# Patient Record
Sex: MALE | Race: OTHER | HISPANIC OR LATINO
[De-identification: names, ages, dates, MRNs, and addresses within clinical notes are randomized per-mention and may not be internally consistent; named-entity substitution may affect disease eponyms.]

---

## 2017-02-21 ENCOUNTER — APPOINTMENT (OUTPATIENT)
Dept: INTERNAL MEDICINE | Facility: CLINIC | Age: 47
End: 2017-02-21

## 2017-03-06 ENCOUNTER — RESULT CHARGE (OUTPATIENT)
Age: 47
End: 2017-03-06

## 2017-03-07 ENCOUNTER — APPOINTMENT (OUTPATIENT)
Dept: INTERNAL MEDICINE | Facility: CLINIC | Age: 47
End: 2017-03-07

## 2017-03-07 VITALS
HEART RATE: 61 BPM | DIASTOLIC BLOOD PRESSURE: 89 MMHG | BODY MASS INDEX: 40.77 KG/M2 | HEIGHT: 68 IN | SYSTOLIC BLOOD PRESSURE: 166 MMHG | WEIGHT: 269 LBS

## 2017-03-09 LAB
ALBUMIN SERPL-MCNC: 4.1 G/DL
ALBUMIN/GLOB SERPL: 1.24
ALP SERPL-CCNC: 82 IU/L
ALT SERPL-CCNC: 25 IU/L
ANION GAP SERPL CALC-SCNC: 9 MEQ/L
AST SERPL-CCNC: 22 IU/L
BASOPHILS # BLD: 0.02 TH/MM3
BASOPHILS NFR BLD: 0.2 %
BILIRUB SERPL-MCNC: 1 MG/DL
BUN SERPL-MCNC: 8 MG/DL
BUN/CREAT SERPL: 11 %
CALCIUM SERPL-MCNC: 9.1 MG/DL
CHLORIDE SERPL-SCNC: 103 MEQ/L
CHOLEST SERPL-MCNC: 191 MG/DL
CO2 SERPL-SCNC: 29 MEQ/L
CREAT SERPL-MCNC: 0.73 MG/DL
EOSINOPHIL # BLD: 0.21 TH/MM3
EOSINOPHIL NFR BLD: 2.6 %
ERYTHROCYTE [DISTWIDTH] IN BLOOD BY AUTOMATED COUNT: 15 %
ESTIMATED AVERGAGE GLUCOSE (NORTH): 128 MG/DL
GFR SERPL CREATININE-BSD FRML MDRD: 116
GLUCOSE SERPL-MCNC: 98 MG/DL
GRANULOCYTES # BLD: 4.94 TH/MM3
GRANULOCYTES NFR BLD: 60.6 %
HBA1C MFR BLD: 6.1 %
HCT VFR BLD AUTO: 43.8 %
HDLC SERPL-MCNC: 35 MG/DL
HDLC SERPL: 5.46
HGB BLD-MCNC: 14.4 G/DL
IMM GRANULOCYTES # BLD: 0.02 TH/MM3
IMM GRANULOCYTES NFR BLD: 0.2 %
LDLC SERPL DIRECT ASSAY-MCNC: 141 MG/DL
LYMPHOCYTES # BLD: 2.44 TH/MM3
LYMPHOCYTES NFR BLD: 29.9 %
MCH RBC QN AUTO: 28.1 PG
MCHC RBC AUTO-ENTMCNC: 32.9 G/DL
MCV RBC AUTO: 85.4 FL
MONOCYTES # BLD: 0.53 TH/MM3
MONOCYTES NFR BLD: 6.5 %
PLATELET # BLD: 235 TH/MM3
PMV BLD AUTO: 10.6 FL
POTASSIUM SERPL-SCNC: 4.7 MMOL/L
PROT SERPL-MCNC: 7.4 G/DL
RBC # BLD AUTO: 5.13 MIL/MM3
SODIUM SERPL-SCNC: 141 MEQ/L
TRIGL SERPL-MCNC: 106 MG/DL
VLDLC SERPL-MCNC: 21 MG/DL
WBC # BLD: 8.16 TH/MM3

## 2017-05-19 ENCOUNTER — APPOINTMENT (OUTPATIENT)
Dept: INTERNAL MEDICINE | Facility: CLINIC | Age: 47
End: 2017-05-19

## 2017-05-19 VITALS
HEIGHT: 68 IN | HEART RATE: 80 BPM | SYSTOLIC BLOOD PRESSURE: 135 MMHG | DIASTOLIC BLOOD PRESSURE: 80 MMHG | BODY MASS INDEX: 41.37 KG/M2 | WEIGHT: 273 LBS

## 2017-05-19 DIAGNOSIS — K76.0 FATTY (CHANGE OF) LIVER, NOT ELSEWHERE CLASSIFIED: ICD-10-CM

## 2017-05-19 DIAGNOSIS — Z87.442 PERSONAL HISTORY OF URINARY CALCULI: ICD-10-CM

## 2017-05-19 DIAGNOSIS — Z86.79 PERSONAL HISTORY OF OTHER DISEASES OF THE CIRCULATORY SYSTEM: ICD-10-CM

## 2017-06-07 ENCOUNTER — APPOINTMENT (OUTPATIENT)
Dept: VASCULAR SURGERY | Facility: CLINIC | Age: 47
End: 2017-06-07

## 2017-06-07 VITALS
DIASTOLIC BLOOD PRESSURE: 80 MMHG | SYSTOLIC BLOOD PRESSURE: 130 MMHG | HEIGHT: 68 IN | BODY MASS INDEX: 41.37 KG/M2 | WEIGHT: 273 LBS

## 2017-06-15 ENCOUNTER — OUTPATIENT (OUTPATIENT)
Dept: OUTPATIENT SERVICES | Facility: HOSPITAL | Age: 47
LOS: 1 days | Discharge: HOME | End: 2017-06-15

## 2017-06-15 DIAGNOSIS — M51.26 OTHER INTERVERTEBRAL DISC DISPLACEMENT, LUMBAR REGION: Chronic | ICD-10-CM

## 2017-06-15 DIAGNOSIS — N20.0 CALCULUS OF KIDNEY: Chronic | ICD-10-CM

## 2017-06-20 ENCOUNTER — OUTPATIENT (OUTPATIENT)
Dept: OUTPATIENT SERVICES | Facility: HOSPITAL | Age: 47
LOS: 1 days | Discharge: HOME | End: 2017-06-20

## 2017-06-20 ENCOUNTER — APPOINTMENT (OUTPATIENT)
Dept: INTERNAL MEDICINE | Facility: CLINIC | Age: 47
End: 2017-06-20

## 2017-06-20 VITALS
HEIGHT: 68 IN | DIASTOLIC BLOOD PRESSURE: 82 MMHG | WEIGHT: 274 LBS | SYSTOLIC BLOOD PRESSURE: 132 MMHG | HEART RATE: 78 BPM | BODY MASS INDEX: 41.52 KG/M2

## 2017-06-20 DIAGNOSIS — N20.0 CALCULUS OF KIDNEY: Chronic | ICD-10-CM

## 2017-06-20 DIAGNOSIS — Z83.3 FAMILY HISTORY OF DIABETES MELLITUS: ICD-10-CM

## 2017-06-20 DIAGNOSIS — M79.601 PAIN IN RIGHT ARM: ICD-10-CM

## 2017-06-20 DIAGNOSIS — R42 DIZZINESS AND GIDDINESS: ICD-10-CM

## 2017-06-20 DIAGNOSIS — M51.26 OTHER INTERVERTEBRAL DISC DISPLACEMENT, LUMBAR REGION: Chronic | ICD-10-CM

## 2017-06-20 DIAGNOSIS — R14.1 GAS PAIN: ICD-10-CM

## 2017-06-20 DIAGNOSIS — Z87.891 PERSONAL HISTORY OF NICOTINE DEPENDENCE: ICD-10-CM

## 2017-06-22 ENCOUNTER — APPOINTMENT (OUTPATIENT)
Dept: VASCULAR SURGERY | Facility: HOSPITAL | Age: 47
End: 2017-06-22

## 2017-06-22 ENCOUNTER — OUTPATIENT (OUTPATIENT)
Dept: OUTPATIENT SERVICES | Facility: HOSPITAL | Age: 47
LOS: 1 days | Discharge: HOME | End: 2017-06-22

## 2017-06-22 DIAGNOSIS — N20.0 CALCULUS OF KIDNEY: Chronic | ICD-10-CM

## 2017-06-22 DIAGNOSIS — I83.892 VARICOSE VEINS OF LEFT LOWER EXTREMITY WITH OTHER COMPLICATIONS: ICD-10-CM

## 2017-06-22 DIAGNOSIS — M51.26 OTHER INTERVERTEBRAL DISC DISPLACEMENT, LUMBAR REGION: Chronic | ICD-10-CM

## 2017-06-28 DIAGNOSIS — I10 ESSENTIAL (PRIMARY) HYPERTENSION: ICD-10-CM

## 2017-06-28 DIAGNOSIS — Z01.818 ENCOUNTER FOR OTHER PREPROCEDURAL EXAMINATION: ICD-10-CM

## 2017-07-12 ENCOUNTER — APPOINTMENT (OUTPATIENT)
Dept: VASCULAR SURGERY | Facility: CLINIC | Age: 47
End: 2017-07-12

## 2017-07-12 VITALS — BODY MASS INDEX: 42.38 KG/M2 | HEIGHT: 67 IN | WEIGHT: 270 LBS

## 2017-08-22 DIAGNOSIS — I83.892 VARICOSE VEINS OF LEFT LOWER EXTREMITY WITH OTHER COMPLICATIONS: ICD-10-CM

## 2017-08-22 DIAGNOSIS — I10 ESSENTIAL (PRIMARY) HYPERTENSION: ICD-10-CM

## 2017-08-22 DIAGNOSIS — G47.33 OBSTRUCTIVE SLEEP APNEA (ADULT) (PEDIATRIC): ICD-10-CM

## 2017-08-22 DIAGNOSIS — E66.9 OBESITY, UNSPECIFIED: ICD-10-CM

## 2017-08-23 ENCOUNTER — APPOINTMENT (OUTPATIENT)
Dept: INTERNAL MEDICINE | Facility: CLINIC | Age: 47
End: 2017-08-23

## 2017-09-15 ENCOUNTER — APPOINTMENT (OUTPATIENT)
Dept: PULMONOLOGY | Facility: CLINIC | Age: 47
End: 2017-09-15

## 2018-01-10 ENCOUNTER — APPOINTMENT (OUTPATIENT)
Dept: VASCULAR SURGERY | Facility: CLINIC | Age: 48
End: 2018-01-10

## 2018-08-23 ENCOUNTER — OUTPATIENT (OUTPATIENT)
Dept: OUTPATIENT SERVICES | Facility: HOSPITAL | Age: 48
LOS: 1 days | Discharge: HOME | End: 2018-08-23

## 2018-08-23 DIAGNOSIS — I10 ESSENTIAL (PRIMARY) HYPERTENSION: ICD-10-CM

## 2018-08-23 DIAGNOSIS — Z00.00 ENCOUNTER FOR GENERAL ADULT MEDICAL EXAMINATION WITHOUT ABNORMAL FINDINGS: ICD-10-CM

## 2018-08-23 DIAGNOSIS — N20.0 CALCULUS OF KIDNEY: Chronic | ICD-10-CM

## 2018-08-23 DIAGNOSIS — M51.26 OTHER INTERVERTEBRAL DISC DISPLACEMENT, LUMBAR REGION: Chronic | ICD-10-CM

## 2018-08-30 DIAGNOSIS — M45.2 ANKYLOSING SPONDYLITIS OF CERVICAL REGION: ICD-10-CM

## 2018-08-30 DIAGNOSIS — R07.9 CHEST PAIN, UNSPECIFIED: ICD-10-CM

## 2018-08-31 ENCOUNTER — EMERGENCY (EMERGENCY)
Facility: HOSPITAL | Age: 48
LOS: 0 days | Discharge: HOME | End: 2018-09-01
Attending: EMERGENCY MEDICINE | Admitting: EMERGENCY MEDICINE

## 2018-08-31 VITALS
DIASTOLIC BLOOD PRESSURE: 68 MMHG | TEMPERATURE: 97 F | HEART RATE: 66 BPM | SYSTOLIC BLOOD PRESSURE: 121 MMHG | RESPIRATION RATE: 20 BRPM | OXYGEN SATURATION: 100 %

## 2018-08-31 DIAGNOSIS — M79.661 PAIN IN RIGHT LOWER LEG: ICD-10-CM

## 2018-08-31 DIAGNOSIS — M79.89 OTHER SPECIFIED SOFT TISSUE DISORDERS: ICD-10-CM

## 2018-08-31 DIAGNOSIS — N20.0 CALCULUS OF KIDNEY: Chronic | ICD-10-CM

## 2018-08-31 DIAGNOSIS — Z98.890 OTHER SPECIFIED POSTPROCEDURAL STATES: ICD-10-CM

## 2018-08-31 DIAGNOSIS — R06.02 SHORTNESS OF BREATH: ICD-10-CM

## 2018-08-31 DIAGNOSIS — I10 ESSENTIAL (PRIMARY) HYPERTENSION: ICD-10-CM

## 2018-08-31 DIAGNOSIS — M51.26 OTHER INTERVERTEBRAL DISC DISPLACEMENT, LUMBAR REGION: Chronic | ICD-10-CM

## 2018-08-31 NOTE — ED ADULT TRIAGE NOTE - CHIEF COMPLAINT QUOTE
Patient presents to ED with complaints of right lower leg and ankle swelling and pain for 1 month. Patient reports leg is red, and painful to touch and hurts most when ambulating.

## 2018-09-01 DIAGNOSIS — I82.90 ACUTE EMBOLISM AND THROMBOSIS OF UNSPECIFIED VEIN: Chronic | ICD-10-CM

## 2018-09-01 LAB
ALBUMIN SERPL ELPH-MCNC: 4.3 G/DL — SIGNIFICANT CHANGE UP (ref 3.5–5.2)
ALP SERPL-CCNC: 96 U/L — SIGNIFICANT CHANGE UP (ref 30–115)
ALT FLD-CCNC: 23 U/L — SIGNIFICANT CHANGE UP (ref 0–41)
ANION GAP SERPL CALC-SCNC: 12 MMOL/L — SIGNIFICANT CHANGE UP (ref 7–14)
APTT BLD: 31.6 SEC — SIGNIFICANT CHANGE UP (ref 27–39.2)
AST SERPL-CCNC: 20 U/L — SIGNIFICANT CHANGE UP (ref 0–41)
BASOPHILS # BLD AUTO: 0.04 K/UL — SIGNIFICANT CHANGE UP (ref 0–0.2)
BASOPHILS NFR BLD AUTO: 0.4 % — SIGNIFICANT CHANGE UP (ref 0–1)
BILIRUB SERPL-MCNC: 0.3 MG/DL — SIGNIFICANT CHANGE UP (ref 0.2–1.2)
BUN SERPL-MCNC: 15 MG/DL — SIGNIFICANT CHANGE UP (ref 10–20)
CALCIUM SERPL-MCNC: 8.8 MG/DL — SIGNIFICANT CHANGE UP (ref 8.5–10.1)
CHLORIDE SERPL-SCNC: 104 MMOL/L — SIGNIFICANT CHANGE UP (ref 98–110)
CO2 SERPL-SCNC: 27 MMOL/L — SIGNIFICANT CHANGE UP (ref 17–32)
CREAT SERPL-MCNC: 1.1 MG/DL — SIGNIFICANT CHANGE UP (ref 0.7–1.5)
D DIMER BLD IA.RAPID-MCNC: 137 NG/ML DDU — SIGNIFICANT CHANGE UP (ref 0–230)
EOSINOPHIL # BLD AUTO: 0.26 K/UL — SIGNIFICANT CHANGE UP (ref 0–0.7)
EOSINOPHIL NFR BLD AUTO: 2.6 % — SIGNIFICANT CHANGE UP (ref 0–8)
GLUCOSE SERPL-MCNC: 133 MG/DL — HIGH (ref 70–99)
HCT VFR BLD CALC: 38.3 % — LOW (ref 42–52)
HGB BLD-MCNC: 12.9 G/DL — LOW (ref 14–18)
IMM GRANULOCYTES NFR BLD AUTO: 0.3 % — SIGNIFICANT CHANGE UP (ref 0.1–0.3)
INR BLD: 1.09 RATIO — SIGNIFICANT CHANGE UP (ref 0.65–1.3)
LYMPHOCYTES # BLD AUTO: 3.28 K/UL — SIGNIFICANT CHANGE UP (ref 1.2–3.4)
LYMPHOCYTES # BLD AUTO: 33.1 % — SIGNIFICANT CHANGE UP (ref 20.5–51.1)
MCHC RBC-ENTMCNC: 28 PG — SIGNIFICANT CHANGE UP (ref 27–31)
MCHC RBC-ENTMCNC: 33.7 G/DL — SIGNIFICANT CHANGE UP (ref 32–37)
MCV RBC AUTO: 83.1 FL — SIGNIFICANT CHANGE UP (ref 80–94)
MONOCYTES # BLD AUTO: 0.83 K/UL — HIGH (ref 0.1–0.6)
MONOCYTES NFR BLD AUTO: 8.4 % — SIGNIFICANT CHANGE UP (ref 1.7–9.3)
NEUTROPHILS # BLD AUTO: 5.46 K/UL — SIGNIFICANT CHANGE UP (ref 1.4–6.5)
NEUTROPHILS NFR BLD AUTO: 55.2 % — SIGNIFICANT CHANGE UP (ref 42.2–75.2)
NT-PROBNP SERPL-SCNC: 49 PG/ML — SIGNIFICANT CHANGE UP (ref 0–300)
PLATELET # BLD AUTO: 222 K/UL — SIGNIFICANT CHANGE UP (ref 130–400)
POTASSIUM SERPL-MCNC: 4.6 MMOL/L — SIGNIFICANT CHANGE UP (ref 3.5–5)
POTASSIUM SERPL-SCNC: 4.6 MMOL/L — SIGNIFICANT CHANGE UP (ref 3.5–5)
PROT SERPL-MCNC: 7 G/DL — SIGNIFICANT CHANGE UP (ref 6–8)
PROTHROM AB SERPL-ACNC: 11.8 SEC — SIGNIFICANT CHANGE UP (ref 9.95–12.87)
RBC # BLD: 4.61 M/UL — LOW (ref 4.7–6.1)
RBC # FLD: 13.8 % — SIGNIFICANT CHANGE UP (ref 11.5–14.5)
SODIUM SERPL-SCNC: 143 MMOL/L — SIGNIFICANT CHANGE UP (ref 135–146)
TROPONIN T SERPL-MCNC: <0.01 NG/ML — SIGNIFICANT CHANGE UP
WBC # BLD: 9.9 K/UL — SIGNIFICANT CHANGE UP (ref 4.8–10.8)
WBC # FLD AUTO: 9.9 K/UL — SIGNIFICANT CHANGE UP (ref 4.8–10.8)

## 2018-09-01 NOTE — ED ADULT NURSE NOTE - NSIMPLEMENTINTERV_GEN_ALL_ED
Implemented All Universal Safety Interventions:  [place] to call system. Call bell, personal items and telephone within reach. Instruct patient to call for assistance. Room bathroom lighting operational. Non-slip footwear when patient is off stretcher. Physically safe environment: no spills, clutter or unnecessary equipment. Stretcher in lowest position, wheels locked, appropriate side rails in place.

## 2018-09-01 NOTE — ED ADULT NURSE NOTE - OBJECTIVE STATEMENT
Pt alert and oriented, pt c/o of RLE swelling and pain over the last month that is worsened over the past week. Pt states he was trying to get an apt with his MD but could not for 2 1/2 weeks. Pt states he has a hx of left leg thrombectomy. Pt states some SOB on exertion, sat @ 100% on RA at this time, pt denies any CP or n/v/d or fevers.

## 2018-09-01 NOTE — ED PROVIDER NOTE - ATTENDING CONTRIBUTION TO CARE
pt with chronic venous stasis, c/o leg swelling, right greater than left, for several days. also c/o sob. no fever, chills, cp, n, v, d. pt in nad, ctab, rrr, ab soft, nt, nd. b/l LE edema, chronic pete stasis dermatitis. nml pulses, cap refill, sensation. no calf tenderness. no homans. no crepitus. no streaking no lad. nml neuro exam. will get labs, imaging.

## 2018-09-01 NOTE — ED PROVIDER NOTE - NS ED ROS FT
Eyes:  No visual changes, eye pain or discharge.  ENMT:   no sore throat or runny nose  Cardiac:  No chest pain, + exertional sob x 1 week.   Respiratory:  No cough or respiratory distress.   GI:  No nausea, vomiting, diarrhea or abdominal pain.  :  No dysuria, frequency or burning.  MS:  + RLE swelling and pain x 1 month that has been getting worse. pain is worse with walking.   Neuro:  No headache   Skin:  No skin rash.   Endocrine: No history of thyroid disease or diabetes.

## 2018-09-01 NOTE — ED ADULT NURSE NOTE - PRIMARY CARE PROVIDER
PCP How Severe Are Your Spot(S)?: mild Have Your Spot(S) Been Treated In The Past?: has not been treated Hpi Title: Evaluation of Skin Lesions

## 2018-09-01 NOTE — ED PROVIDER NOTE - PHYSICAL EXAMINATION
CONSTITUTIONAL: Well-developed; well-nourished; in no acute distress.   SKIN: warm, dry  HEAD: Normocephalic; atraumatic.  EYES: PERRL, no conjunctival erythema  ENT: No nasal discharge; airway clear.  NECK: Supple; non tender.  CARD: S1, S2 normal; . Regular rate and rhythm.   RESP: No wheezes, rales or rhonchi.  ABD: soft ntnd  EXT: Normal ROM.  + RLE 2+ edema, + tenderness over the calf and lateral lower leg. patient able to ambulate. 5/5 strength   LYMPH: No acute cervical adenopathy.  NEURO: Alert, oriented, grossly unremarkable. neurovascularly intact

## 2018-09-01 NOTE — ED PROVIDER NOTE - OBJECTIVE STATEMENT
47 yo M pmh of HTN presents with pain and swelling in RLE for 1 month that has been getting worse. States that he thought it would resolved but hasn't so came in for evaluation. Also reports new exertional shortness of breath for 1 week. no cp, no n/v/d, no abdominal pain, no fever. Has been ambulating but states that leg pain is worse with ambulation.

## 2018-09-12 ENCOUNTER — APPOINTMENT (OUTPATIENT)
Dept: VASCULAR SURGERY | Facility: CLINIC | Age: 48
End: 2018-09-12
Payer: SUBSIDIZED

## 2018-09-12 DIAGNOSIS — Z01.818 ENCOUNTER FOR OTHER PREPROCEDURAL EXAMINATION: ICD-10-CM

## 2018-09-12 DIAGNOSIS — I83.12 VARICOSE VEINS OF LEFT LOWER EXTREMITY WITH INFLAMMATION: ICD-10-CM

## 2018-09-12 DIAGNOSIS — M79.661 PAIN IN RIGHT LOWER LEG: ICD-10-CM

## 2018-09-12 PROBLEM — I10 ESSENTIAL (PRIMARY) HYPERTENSION: Chronic | Status: ACTIVE | Noted: 2018-09-01

## 2018-09-12 PROCEDURE — 99213 OFFICE O/P EST LOW 20 MIN: CPT

## 2018-12-05 ENCOUNTER — APPOINTMENT (OUTPATIENT)
Dept: VASCULAR SURGERY | Facility: CLINIC | Age: 48
End: 2018-12-05

## 2019-04-05 ENCOUNTER — OUTPATIENT (OUTPATIENT)
Dept: OUTPATIENT SERVICES | Facility: HOSPITAL | Age: 49
LOS: 1 days | Discharge: HOME | End: 2019-04-05

## 2019-04-05 DIAGNOSIS — I82.90 ACUTE EMBOLISM AND THROMBOSIS OF UNSPECIFIED VEIN: Chronic | ICD-10-CM

## 2019-04-05 DIAGNOSIS — M51.26 OTHER INTERVERTEBRAL DISC DISPLACEMENT, LUMBAR REGION: Chronic | ICD-10-CM

## 2019-04-05 DIAGNOSIS — N20.0 CALCULUS OF KIDNEY: Chronic | ICD-10-CM

## 2019-04-05 DIAGNOSIS — E55.9 VITAMIN D DEFICIENCY, UNSPECIFIED: ICD-10-CM

## 2019-05-14 ENCOUNTER — EMERGENCY (EMERGENCY)
Facility: HOSPITAL | Age: 49
LOS: 0 days | Discharge: HOME | End: 2019-05-15
Attending: EMERGENCY MEDICINE | Admitting: EMERGENCY MEDICINE
Payer: SUBSIDIZED

## 2019-05-14 VITALS
DIASTOLIC BLOOD PRESSURE: 85 MMHG | TEMPERATURE: 98 F | HEART RATE: 78 BPM | SYSTOLIC BLOOD PRESSURE: 175 MMHG | OXYGEN SATURATION: 99 % | RESPIRATION RATE: 18 BRPM

## 2019-05-14 DIAGNOSIS — N20.0 CALCULUS OF KIDNEY: ICD-10-CM

## 2019-05-14 DIAGNOSIS — N20.0 CALCULUS OF KIDNEY: Chronic | ICD-10-CM

## 2019-05-14 DIAGNOSIS — I82.90 ACUTE EMBOLISM AND THROMBOSIS OF UNSPECIFIED VEIN: Chronic | ICD-10-CM

## 2019-05-14 DIAGNOSIS — M51.26 OTHER INTERVERTEBRAL DISC DISPLACEMENT, LUMBAR REGION: Chronic | ICD-10-CM

## 2019-05-14 DIAGNOSIS — R10.9 UNSPECIFIED ABDOMINAL PAIN: ICD-10-CM

## 2019-05-14 LAB
ALBUMIN SERPL ELPH-MCNC: 4.6 G/DL — SIGNIFICANT CHANGE UP (ref 3.5–5.2)
ALP SERPL-CCNC: 88 U/L — SIGNIFICANT CHANGE UP (ref 30–115)
ALT FLD-CCNC: 22 U/L — SIGNIFICANT CHANGE UP (ref 0–41)
ANION GAP SERPL CALC-SCNC: 14 MMOL/L — SIGNIFICANT CHANGE UP (ref 7–14)
APPEARANCE UR: ABNORMAL
APTT BLD: 32.5 SEC — SIGNIFICANT CHANGE UP (ref 27–39.2)
AST SERPL-CCNC: 17 U/L — SIGNIFICANT CHANGE UP (ref 0–41)
BASOPHILS # BLD AUTO: 0.03 K/UL — SIGNIFICANT CHANGE UP (ref 0–0.2)
BASOPHILS NFR BLD AUTO: 0.2 % — SIGNIFICANT CHANGE UP (ref 0–1)
BILIRUB DIRECT SERPL-MCNC: 0.2 MG/DL — SIGNIFICANT CHANGE UP (ref 0–0.2)
BILIRUB INDIRECT FLD-MCNC: 0.5 MG/DL — SIGNIFICANT CHANGE UP (ref 0.2–1.2)
BILIRUB SERPL-MCNC: 0.7 MG/DL — SIGNIFICANT CHANGE UP (ref 0.2–1.2)
BILIRUB UR-MCNC: NEGATIVE — SIGNIFICANT CHANGE UP
BUN SERPL-MCNC: 9 MG/DL — LOW (ref 10–20)
CALCIUM SERPL-MCNC: 9.1 MG/DL — SIGNIFICANT CHANGE UP (ref 8.5–10.1)
CHLORIDE SERPL-SCNC: 97 MMOL/L — LOW (ref 98–110)
CO2 SERPL-SCNC: 24 MMOL/L — SIGNIFICANT CHANGE UP (ref 17–32)
COLOR SPEC: ABNORMAL
CREAT SERPL-MCNC: 1 MG/DL — SIGNIFICANT CHANGE UP (ref 0.7–1.5)
DIFF PNL FLD: ABNORMAL
EOSINOPHIL # BLD AUTO: 0.01 K/UL — SIGNIFICANT CHANGE UP (ref 0–0.7)
EOSINOPHIL NFR BLD AUTO: 0.1 % — SIGNIFICANT CHANGE UP (ref 0–8)
GLUCOSE SERPL-MCNC: 120 MG/DL — HIGH (ref 70–99)
GLUCOSE UR QL: NEGATIVE MG/DL — SIGNIFICANT CHANGE UP
HCT VFR BLD CALC: 45.8 % — SIGNIFICANT CHANGE UP (ref 42–52)
HGB BLD-MCNC: 15.5 G/DL — SIGNIFICANT CHANGE UP (ref 14–18)
IMM GRANULOCYTES NFR BLD AUTO: 0.5 % — HIGH (ref 0.1–0.3)
INR BLD: 1.23 RATIO — SIGNIFICANT CHANGE UP (ref 0.65–1.3)
KETONES UR-MCNC: ABNORMAL
LACTATE SERPL-SCNC: 1.4 MMOL/L — SIGNIFICANT CHANGE UP (ref 0.5–2.2)
LEUKOCYTE ESTERASE UR-ACNC: ABNORMAL
LIDOCAIN IGE QN: 17 U/L — SIGNIFICANT CHANGE UP (ref 7–60)
LYMPHOCYTES # BLD AUTO: 13.4 % — LOW (ref 20.5–51.1)
LYMPHOCYTES # BLD AUTO: 2 K/UL — SIGNIFICANT CHANGE UP (ref 1.2–3.4)
MCHC RBC-ENTMCNC: 27.4 PG — SIGNIFICANT CHANGE UP (ref 27–31)
MCHC RBC-ENTMCNC: 33.8 G/DL — SIGNIFICANT CHANGE UP (ref 32–37)
MCV RBC AUTO: 80.9 FL — SIGNIFICANT CHANGE UP (ref 80–94)
MONOCYTES # BLD AUTO: 0.9 K/UL — HIGH (ref 0.1–0.6)
MONOCYTES NFR BLD AUTO: 6 % — SIGNIFICANT CHANGE UP (ref 1.7–9.3)
NEUTROPHILS # BLD AUTO: 11.92 K/UL — HIGH (ref 1.4–6.5)
NEUTROPHILS NFR BLD AUTO: 79.8 % — HIGH (ref 42.2–75.2)
NITRITE UR-MCNC: NEGATIVE — SIGNIFICANT CHANGE UP
NRBC # BLD: 0 /100 WBCS — SIGNIFICANT CHANGE UP (ref 0–0)
PH UR: 6 — SIGNIFICANT CHANGE UP (ref 5–8)
PLATELET # BLD AUTO: 231 K/UL — SIGNIFICANT CHANGE UP (ref 130–400)
POTASSIUM SERPL-MCNC: 4 MMOL/L — SIGNIFICANT CHANGE UP (ref 3.5–5)
POTASSIUM SERPL-SCNC: 4 MMOL/L — SIGNIFICANT CHANGE UP (ref 3.5–5)
PROT SERPL-MCNC: 8.1 G/DL — HIGH (ref 6–8)
PROT UR-MCNC: 100 MG/DL
PROTHROM AB SERPL-ACNC: 14.1 SEC — HIGH (ref 9.95–12.87)
RBC # BLD: 5.66 M/UL — SIGNIFICANT CHANGE UP (ref 4.7–6.1)
RBC # FLD: 13.7 % — SIGNIFICANT CHANGE UP (ref 11.5–14.5)
RBC CASTS # UR COMP ASSIST: >50 /HPF
SODIUM SERPL-SCNC: 135 MMOL/L — SIGNIFICANT CHANGE UP (ref 135–146)
SP GR SPEC: 1.02 — SIGNIFICANT CHANGE UP (ref 1.01–1.03)
TROPONIN T SERPL-MCNC: <0.01 NG/ML — SIGNIFICANT CHANGE UP
UROBILINOGEN FLD QL: 0.2 MG/DL — SIGNIFICANT CHANGE UP (ref 0.2–0.2)
WBC # BLD: 14.93 K/UL — HIGH (ref 4.8–10.8)
WBC # FLD AUTO: 14.93 K/UL — HIGH (ref 4.8–10.8)
WBC UR QL: ABNORMAL /HPF

## 2019-05-14 PROCEDURE — 93010 ELECTROCARDIOGRAM REPORT: CPT

## 2019-05-14 PROCEDURE — 71046 X-RAY EXAM CHEST 2 VIEWS: CPT | Mod: 26

## 2019-05-14 PROCEDURE — 99285 EMERGENCY DEPT VISIT HI MDM: CPT

## 2019-05-14 RX ORDER — MORPHINE SULFATE 50 MG/1
6 CAPSULE, EXTENDED RELEASE ORAL ONCE
Refills: 0 | Status: DISCONTINUED | OUTPATIENT
Start: 2019-05-14 | End: 2019-05-14

## 2019-05-14 RX ORDER — SODIUM CHLORIDE 9 MG/ML
1000 INJECTION INTRAMUSCULAR; INTRAVENOUS; SUBCUTANEOUS ONCE
Refills: 0 | Status: COMPLETED | OUTPATIENT
Start: 2019-05-14 | End: 2019-05-14

## 2019-05-14 RX ADMIN — SODIUM CHLORIDE 1000 MILLILITER(S): 9 INJECTION INTRAMUSCULAR; INTRAVENOUS; SUBCUTANEOUS at 22:51

## 2019-05-14 RX ADMIN — MORPHINE SULFATE 6 MILLIGRAM(S): 50 CAPSULE, EXTENDED RELEASE ORAL at 22:51

## 2019-05-14 NOTE — ED PROVIDER NOTE - ATTENDING CONTRIBUTION TO CARE
49 yo male with pmh of obesity, venous stasis dermatitis, kidney stones, sleep apnea, HTN, discectomy, presents to the ER for joint pains, body aches, diarrhea for 1-2 days,  and then left flank pain just PTA. Pt states he's having urinary frequency, no burning, some shaky chills, no fevers/N/sweats. Feels discomfort to his joints amanda to upper body compared to lower body, and had watery, non bloody diarrhea x several yesterday. Diarrhea improved after immodium. Noticed he then developed some discomfort to the left chest area which has been constant since yesterday. No SOB, cough, sneezing, runny nose, rashes, abdomen pain, testicular pain. Pt states he initially came for the cp/body aches but by the time he got to the ER he had left flank pain with no radiation to abdomen. 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 No travel or sick contacts. On exam ncat, perrl, eomi, lungs ctab, heart regular s1s2, abdomen soft nt/nd +bs, no flank tenderness, ext +joint tenderness to fingers of b/l hands, elbows b/l, shoulders b/l, and a little to knees b/l with no redness, warmth, or signif swelling, Neuro intact, no cspine tenderness. Check ekg, labs, xray, urine and reassess.

## 2019-05-14 NOTE — ED ADULT NURSE NOTE - NSIMPLEMENTINTERV_GEN_ALL_ED
Implemented All Universal Safety Interventions:  [place] to call system. Call bell, personal items and telephone within reach. Instruct patient to call for assistance. Room bathroom lighting operational. Non-slip footwear when patient is off stretcher. Physically safe environment: no spills, clutter or unnecessary equipment. Stretcher in lowest position, wheels locked, appropriate side rails in place.

## 2019-05-14 NOTE — ED PROVIDER NOTE - CARE PROVIDER_API CALL
Raffi Malone)  [facility]  [address] [place] [address], [address]  [place] [address]  Phone: [phone]  Fax: [phone]  Follow Up Time: 1-3 Days

## 2019-05-14 NOTE — ED PROVIDER NOTE - NS ED ROS FT
Review of Systems  Constitutional:  No fever, chills.  Eyes:  No visual changes, eye pain, or discharge.  ENMT:  No hearing changes, pain, or discharge. No nasal congestion, discharge, or bleeding. No throat pain, swelling, or difficulty swallowing.  Cardiac:  No chest pain, palpitations, syncope.  Respiratory:  No dyspnea, orthopnea, stridor, wheezing, or cough. No hemoptysis.  GI:  No nausea, vomiting, or abdominal pain. (+) diarrhea   :  No dysuria, hematuria, frequency, or burning.   MS:  (+) left flank pain  Skin:  No skin rash, pruritis, jaundice, or lesions.  Neuro:  No headache, dizziness, loss of sensation, or focal weakness.  No change in mental status.   Endocrine: No history of thyroid disease or diabetes.

## 2019-05-14 NOTE — ED ADULT NURSE NOTE - NS ED NURSE LEVEL OF CONSCIOUSNESS SPEECH
[FreeTextEntry1] : History of NADEGE diagnosed 2017; mod NADEGE AHI 25.\par Intolerant of CPAP in past\par Had EMA appliance made-was not successful as tongue stuck to it, patient pulled appliance out at night unawares. Seeing second dental opinion.\par 
Speaking Coherently

## 2019-05-14 NOTE — ED PROVIDER NOTE - OBJECTIVE STATEMENT
47 yo M with PMHx of HTN, venous stasis, BRISA, lumbar discectomy, and kidney stones presents to the ED c/o body aches, joint pains, non-bloody diarrhea over the past two days with development of left flank pain about an hour ago. Pt admits to associated urinary frequency and chills. Pt took imodium without relief of symptoms. Pt denies fever, nausea, vomiting, abdominal pain, headache, dizziness, weakness, chest pain, SOB, LOC, trauma, cough, calf pain/swelling, recent travel, recent surgery.

## 2019-05-14 NOTE — ED PROVIDER NOTE - PHYSICAL EXAMINATION
VITAL SIGNS: I have reviewed nursing notes and confirm.  CONSTITUTIONAL: Well-developed; well-nourished; in no acute distress.  SKIN: Skin exam is warm and dry, no acute rash.  HEAD: Normocephalic; atraumatic.  EYES: Conjunctiva and sclera clear.  ENT: No nasal discharge; airway clear.   NECK: Supple; non tender.  CARD: S1, S2 normal; no murmurs, gallops, or rubs. Regular rate and rhythm.  RESP: No wheezes, rales or rhonchi. Speaking in full sentences.   ABD: Normal bowel sounds; soft; non-distended; non-tender; No rebound or guarding. No CVA tenderness.  EXT: Normal ROM. No clubbing, cyanosis or edema.   NEURO: Alert, oriented. Grossly unremarkable. No focal deficits.

## 2019-05-14 NOTE — ED PROVIDER NOTE - CLINICAL SUMMARY MEDICAL DECISION MAKING FREE TEXT BOX
49 yo male presents to ER for body aches, joint pains, diarrhea and then left flank pain just PTA. Pt had bilateral shoulder pain going into chest, but at this point pain mostly left flank. Pt had labs,ekg, xray, and CT scan. Found to have kidney stone. Pain treated, referred to Urology and follow up instructions explained. Return to er for any worsening.

## 2019-05-14 NOTE — ED PROVIDER NOTE - PROGRESS NOTE DETAILS
Discussed case with urology, pt can follow-up with Dr. Deal as outpt. Will d/c with PO abx. Pt reports improvement in symptoms, currently asymptomatic. Discussed results and provided copy to pt. Pt understands to follow-up with urology. Pt understands to return to ED if symptoms return/worsen. Agreeable to discharge.

## 2019-05-15 VITALS
TEMPERATURE: 98 F | SYSTOLIC BLOOD PRESSURE: 106 MMHG | DIASTOLIC BLOOD PRESSURE: 57 MMHG | OXYGEN SATURATION: 98 % | RESPIRATION RATE: 19 BRPM | HEART RATE: 71 BPM

## 2019-05-15 PROCEDURE — 74177 CT ABD & PELVIS W/CONTRAST: CPT | Mod: 26

## 2019-05-15 RX ORDER — KETOROLAC TROMETHAMINE 30 MG/ML
1 SYRINGE (ML) INJECTION
Qty: 9 | Refills: 0
Start: 2019-05-15 | End: 2019-05-17

## 2019-05-15 RX ORDER — CEFTRIAXONE 500 MG/1
1 INJECTION, POWDER, FOR SOLUTION INTRAMUSCULAR; INTRAVENOUS ONCE
Refills: 0 | Status: COMPLETED | OUTPATIENT
Start: 2019-05-15 | End: 2019-05-15

## 2019-05-15 RX ORDER — KETOROLAC TROMETHAMINE 30 MG/ML
15 SYRINGE (ML) INJECTION ONCE
Refills: 0 | Status: DISCONTINUED | OUTPATIENT
Start: 2019-05-15 | End: 2019-05-15

## 2019-05-15 RX ORDER — SODIUM CHLORIDE 9 MG/ML
1000 INJECTION INTRAMUSCULAR; INTRAVENOUS; SUBCUTANEOUS ONCE
Refills: 0 | Status: COMPLETED | OUTPATIENT
Start: 2019-05-15 | End: 2019-05-15

## 2019-05-15 RX ORDER — TAMSULOSIN HYDROCHLORIDE 0.4 MG/1
1 CAPSULE ORAL
Qty: 7 | Refills: 0
Start: 2019-05-15 | End: 2019-05-21

## 2019-05-15 RX ORDER — ACETAMINOPHEN 500 MG
650 TABLET ORAL ONCE
Refills: 0 | Status: COMPLETED | OUTPATIENT
Start: 2019-05-15 | End: 2019-05-15

## 2019-05-15 RX ORDER — MORPHINE SULFATE 50 MG/1
6 CAPSULE, EXTENDED RELEASE ORAL ONCE
Refills: 0 | Status: DISCONTINUED | OUTPATIENT
Start: 2019-05-15 | End: 2019-05-15

## 2019-05-15 RX ORDER — TAMSULOSIN HYDROCHLORIDE 0.4 MG/1
0.4 CAPSULE ORAL ONCE
Refills: 0 | Status: COMPLETED | OUTPATIENT
Start: 2019-05-15 | End: 2019-05-15

## 2019-05-15 RX ORDER — CEFPODOXIME PROXETIL 100 MG
1 TABLET ORAL
Qty: 20 | Refills: 0
Start: 2019-05-15 | End: 2019-05-24

## 2019-05-15 RX ADMIN — Medication 650 MILLIGRAM(S): at 00:21

## 2019-05-15 RX ADMIN — MORPHINE SULFATE 6 MILLIGRAM(S): 50 CAPSULE, EXTENDED RELEASE ORAL at 01:32

## 2019-05-15 RX ADMIN — CEFTRIAXONE 100 GRAM(S): 500 INJECTION, POWDER, FOR SOLUTION INTRAMUSCULAR; INTRAVENOUS at 03:02

## 2019-05-15 RX ADMIN — SODIUM CHLORIDE 1000 MILLILITER(S): 9 INJECTION INTRAMUSCULAR; INTRAVENOUS; SUBCUTANEOUS at 03:02

## 2019-05-15 RX ADMIN — TAMSULOSIN HYDROCHLORIDE 0.4 MILLIGRAM(S): 0.4 CAPSULE ORAL at 02:38

## 2019-05-15 RX ADMIN — Medication 15 MILLIGRAM(S): at 01:33

## 2019-05-16 LAB
CULTURE RESULTS: SIGNIFICANT CHANGE UP
SPECIMEN SOURCE: SIGNIFICANT CHANGE UP

## 2019-08-26 ENCOUNTER — OUTPATIENT (OUTPATIENT)
Dept: OUTPATIENT SERVICES | Facility: HOSPITAL | Age: 49
LOS: 1 days | Discharge: HOME | End: 2019-08-26

## 2019-08-26 DIAGNOSIS — E66.9 OBESITY, UNSPECIFIED: ICD-10-CM

## 2019-08-26 DIAGNOSIS — I82.90 ACUTE EMBOLISM AND THROMBOSIS OF UNSPECIFIED VEIN: Chronic | ICD-10-CM

## 2019-08-26 DIAGNOSIS — R10.9 UNSPECIFIED ABDOMINAL PAIN: ICD-10-CM

## 2019-08-30 ENCOUNTER — EMERGENCY (EMERGENCY)
Facility: HOSPITAL | Age: 49
LOS: 0 days | Discharge: HOME | End: 2019-08-30
Attending: STUDENT IN AN ORGANIZED HEALTH CARE EDUCATION/TRAINING PROGRAM | Admitting: STUDENT IN AN ORGANIZED HEALTH CARE EDUCATION/TRAINING PROGRAM
Payer: SUBSIDIZED

## 2019-08-30 VITALS
OXYGEN SATURATION: 94 % | RESPIRATION RATE: 16 BRPM | SYSTOLIC BLOOD PRESSURE: 163 MMHG | TEMPERATURE: 97 F | HEART RATE: 70 BPM | WEIGHT: 279.99 LBS | DIASTOLIC BLOOD PRESSURE: 36 MMHG

## 2019-08-30 DIAGNOSIS — R10.9 UNSPECIFIED ABDOMINAL PAIN: ICD-10-CM

## 2019-08-30 DIAGNOSIS — I82.90 ACUTE EMBOLISM AND THROMBOSIS OF UNSPECIFIED VEIN: Chronic | ICD-10-CM

## 2019-08-30 DIAGNOSIS — M51.26 OTHER INTERVERTEBRAL DISC DISPLACEMENT, LUMBAR REGION: Chronic | ICD-10-CM

## 2019-08-30 DIAGNOSIS — I10 ESSENTIAL (PRIMARY) HYPERTENSION: ICD-10-CM

## 2019-08-30 DIAGNOSIS — N20.0 CALCULUS OF KIDNEY: Chronic | ICD-10-CM

## 2019-08-30 DIAGNOSIS — F10.99 ALCOHOL USE, UNSPECIFIED WITH UNSPECIFIED ALCOHOL-INDUCED DISORDER: ICD-10-CM

## 2019-08-30 DIAGNOSIS — N20.0 CALCULUS OF KIDNEY: ICD-10-CM

## 2019-08-30 DIAGNOSIS — Z98.890 OTHER SPECIFIED POSTPROCEDURAL STATES: ICD-10-CM

## 2019-08-30 LAB
ALBUMIN SERPL ELPH-MCNC: 4.3 G/DL — SIGNIFICANT CHANGE UP (ref 3.5–5.2)
ALP SERPL-CCNC: 92 U/L — SIGNIFICANT CHANGE UP (ref 30–115)
ALT FLD-CCNC: 28 U/L — SIGNIFICANT CHANGE UP (ref 0–41)
ANION GAP SERPL CALC-SCNC: 11 MMOL/L — SIGNIFICANT CHANGE UP (ref 7–14)
APPEARANCE UR: CLEAR — SIGNIFICANT CHANGE UP
AST SERPL-CCNC: 23 U/L — SIGNIFICANT CHANGE UP (ref 0–41)
BACTERIA # UR AUTO: NEGATIVE — SIGNIFICANT CHANGE UP
BASOPHILS # BLD AUTO: 0.04 K/UL — SIGNIFICANT CHANGE UP (ref 0–0.2)
BASOPHILS NFR BLD AUTO: 0.5 % — SIGNIFICANT CHANGE UP (ref 0–1)
BILIRUB SERPL-MCNC: 0.4 MG/DL — SIGNIFICANT CHANGE UP (ref 0.2–1.2)
BILIRUB UR-MCNC: NEGATIVE — SIGNIFICANT CHANGE UP
BUN SERPL-MCNC: 14 MG/DL — SIGNIFICANT CHANGE UP (ref 10–20)
CALCIUM SERPL-MCNC: 9.3 MG/DL — SIGNIFICANT CHANGE UP (ref 8.5–10.1)
CHLORIDE SERPL-SCNC: 105 MMOL/L — SIGNIFICANT CHANGE UP (ref 98–110)
CO2 SERPL-SCNC: 26 MMOL/L — SIGNIFICANT CHANGE UP (ref 17–32)
COLOR SPEC: YELLOW — SIGNIFICANT CHANGE UP
CREAT SERPL-MCNC: 0.9 MG/DL — SIGNIFICANT CHANGE UP (ref 0.7–1.5)
DIFF PNL FLD: ABNORMAL
EOSINOPHIL # BLD AUTO: 0.17 K/UL — SIGNIFICANT CHANGE UP (ref 0–0.7)
EOSINOPHIL NFR BLD AUTO: 2.1 % — SIGNIFICANT CHANGE UP (ref 0–8)
EPI CELLS # UR: 0 /HPF — SIGNIFICANT CHANGE UP (ref 0–5)
GLUCOSE SERPL-MCNC: 120 MG/DL — HIGH (ref 70–99)
GLUCOSE UR QL: NEGATIVE — SIGNIFICANT CHANGE UP
HCT VFR BLD CALC: 42.7 % — SIGNIFICANT CHANGE UP (ref 42–52)
HGB BLD-MCNC: 14.1 G/DL — SIGNIFICANT CHANGE UP (ref 14–18)
HYALINE CASTS # UR AUTO: 0 /LPF — SIGNIFICANT CHANGE UP (ref 0–7)
IMM GRANULOCYTES NFR BLD AUTO: 0.3 % — SIGNIFICANT CHANGE UP (ref 0.1–0.3)
KETONES UR-MCNC: NEGATIVE — SIGNIFICANT CHANGE UP
LACTATE SERPL-SCNC: 1.4 MMOL/L — SIGNIFICANT CHANGE UP (ref 0.5–2.2)
LEUKOCYTE ESTERASE UR-ACNC: NEGATIVE — SIGNIFICANT CHANGE UP
LIDOCAIN IGE QN: 28 U/L — SIGNIFICANT CHANGE UP (ref 7–60)
LYMPHOCYTES # BLD AUTO: 2.78 K/UL — SIGNIFICANT CHANGE UP (ref 1.2–3.4)
LYMPHOCYTES # BLD AUTO: 35 % — SIGNIFICANT CHANGE UP (ref 20.5–51.1)
MCHC RBC-ENTMCNC: 27.5 PG — SIGNIFICANT CHANGE UP (ref 27–31)
MCHC RBC-ENTMCNC: 33 G/DL — SIGNIFICANT CHANGE UP (ref 32–37)
MCV RBC AUTO: 83.4 FL — SIGNIFICANT CHANGE UP (ref 80–94)
MONOCYTES # BLD AUTO: 0.59 K/UL — SIGNIFICANT CHANGE UP (ref 0.1–0.6)
MONOCYTES NFR BLD AUTO: 7.4 % — SIGNIFICANT CHANGE UP (ref 1.7–9.3)
NEUTROPHILS # BLD AUTO: 4.34 K/UL — SIGNIFICANT CHANGE UP (ref 1.4–6.5)
NEUTROPHILS NFR BLD AUTO: 54.7 % — SIGNIFICANT CHANGE UP (ref 42.2–75.2)
NITRITE UR-MCNC: NEGATIVE — SIGNIFICANT CHANGE UP
NRBC # BLD: 0 /100 WBCS — SIGNIFICANT CHANGE UP (ref 0–0)
PH UR: 6 — SIGNIFICANT CHANGE UP (ref 5–8)
PLATELET # BLD AUTO: 249 K/UL — SIGNIFICANT CHANGE UP (ref 130–400)
POTASSIUM SERPL-MCNC: 4.6 MMOL/L — SIGNIFICANT CHANGE UP (ref 3.5–5)
POTASSIUM SERPL-SCNC: 4.6 MMOL/L — SIGNIFICANT CHANGE UP (ref 3.5–5)
PROT SERPL-MCNC: 8.1 G/DL — HIGH (ref 6–8)
PROT UR-MCNC: SIGNIFICANT CHANGE UP
RBC # BLD: 5.12 M/UL — SIGNIFICANT CHANGE UP (ref 4.7–6.1)
RBC # FLD: 13.4 % — SIGNIFICANT CHANGE UP (ref 11.5–14.5)
RBC CASTS # UR COMP ASSIST: 447 /HPF — HIGH (ref 0–4)
SODIUM SERPL-SCNC: 142 MMOL/L — SIGNIFICANT CHANGE UP (ref 135–146)
SP GR SPEC: 1.02 — SIGNIFICANT CHANGE UP (ref 1.01–1.02)
UROBILINOGEN FLD QL: SIGNIFICANT CHANGE UP
WBC # BLD: 7.94 K/UL — SIGNIFICANT CHANGE UP (ref 4.8–10.8)
WBC # FLD AUTO: 7.94 K/UL — SIGNIFICANT CHANGE UP (ref 4.8–10.8)
WBC UR QL: 2 /HPF — SIGNIFICANT CHANGE UP (ref 0–5)

## 2019-08-30 PROCEDURE — 74176 CT ABD & PELVIS W/O CONTRAST: CPT | Mod: 26

## 2019-08-30 PROCEDURE — 99284 EMERGENCY DEPT VISIT MOD MDM: CPT

## 2019-08-30 PROCEDURE — 99053 MED SERV 10PM-8AM 24 HR FAC: CPT

## 2019-08-30 RX ORDER — KETOROLAC TROMETHAMINE 30 MG/ML
30 SYRINGE (ML) INJECTION ONCE
Refills: 0 | Status: DISCONTINUED | OUTPATIENT
Start: 2019-08-30 | End: 2019-08-30

## 2019-08-30 RX ORDER — TAMSULOSIN HYDROCHLORIDE 0.4 MG/1
1 CAPSULE ORAL
Qty: 30 | Refills: 0
Start: 2019-08-30 | End: 2019-09-28

## 2019-08-30 RX ORDER — SODIUM CHLORIDE 9 MG/ML
1000 INJECTION INTRAMUSCULAR; INTRAVENOUS; SUBCUTANEOUS ONCE
Refills: 0 | Status: COMPLETED | OUTPATIENT
Start: 2019-08-30 | End: 2019-08-30

## 2019-08-30 RX ADMIN — Medication 30 MILLIGRAM(S): at 08:19

## 2019-08-30 RX ADMIN — SODIUM CHLORIDE 2000 MILLILITER(S): 9 INJECTION INTRAMUSCULAR; INTRAVENOUS; SUBCUTANEOUS at 08:19

## 2019-08-30 NOTE — ED ADULT TRIAGE NOTE - AS O2 DELIVERY
Patient seen and examined at bedside. Pain improved, resting in bed.    ICU Vital Signs Last 24 Hrs  T(C): 36.5 (02 Jul 2018 05:09), Max: 36.9 (01 Jul 2018 21:50)  T(F): 97.7 (02 Jul 2018 05:09), Max: 98.4 (01 Jul 2018 21:50)  HR: 77 (02 Jul 2018 05:09) (73 - 89)  BP: 135/86 (02 Jul 2018 05:09) (104/70 - 147/90)  BP(mean): --  ABP: --  ABP(mean): --  RR: 18 (02 Jul 2018 05:09) (18 - 18)  SpO2: 93% (02 Jul 2018 05:09) (93% - 95%)    Gen: Awake, alert, NAD    Spine  -EHL/FHL/TA/GSC/Quad/Ham/HF/HE 5/5  -SILT L2-S1  -DP/PT pulses 2+  -B/L LE wrapped in Ace wraps for lymphedema  -no calf TTP room air

## 2019-08-30 NOTE — ED PROVIDER NOTE - OBJECTIVE STATEMENT
Pt is a 48 y/o M w/ h/o recurrent kidney stones, HTN, and lumbar disc herniation (s/p discectomy), presenting today w/ L flank pain x 2 weeks. The pain is described as sharp and colicky, w/ radiation to L suprapubic region. He also notes hematuria found on UA done 1 week ago. He reports he last had a kidney stone 3 months ago, and once required ureteroscopic removal for a stone 8 years ago. He denies any recent strenuous activity, denies any trauma. He denies h/o smoking. He denies fever, chills, n/v, diarrhea, constipation, abd pain.

## 2019-08-30 NOTE — ED PROVIDER NOTE - PATIENT PORTAL LINK FT
You can access the FollowMyHealth Patient Portal offered by [facility] by registering at the following website: [email or web address]. By joining [facility]’s FollowMyHealth portal, you will also be able to view your health information using other applications (apps) compatible with our system.

## 2019-08-30 NOTE — ED PROVIDER NOTE - PHYSICAL EXAMINATION
GEN: well appearing male in mild distress, standing and shifting positions  CARD: nl s1 s2, RRR, no m/r/g  RESP: nl respiratory effort, CTAB  ABD: obese, soft, non-tender, no palpable pulsations, no abd bruits auscultated  BACK: no CVA tenderness, no midline tenderness, nl ROM  EXT: FROMx4, negative straight leg raise b/l  NEURO: A&Ox3, nl sensation to gross touch b/l, 5/5 strength b/l  SKIN: warm, dry

## 2019-08-30 NOTE — ED ADULT NURSE NOTE - OBJECTIVE STATEMENT
patient c/o left flank pain for approx 3 weeks. denies any fevers/chills/nausea/vomiting. c/o urinary frequency

## 2019-08-30 NOTE — ED PROVIDER NOTE - CLINICAL SUMMARY MEDICAL DECISION MAKING FREE TEXT BOX
Pt w/ L renal stone. NO uti. no acute Ed events. stable for dc w/  fup. Pt understands signs and symptoms for ED return. dc home.

## 2019-08-30 NOTE — ED PROVIDER NOTE - NS ED ROS FT
CONST: no fever, no chills  HEENT: no eye pain, no ear pain  CARD: no chest pain, no palpitations  RESP: no cough, no SOB  GI: no nausea, no vomiting, no diarrhea, no constipation, no abd pain  : see HPI  MSK: no back pain, no limb pain  SKIN: no rash, no pruritus  NEURO: no headache, no dizziness  ENDO: no polydypsia, no polyuria

## 2019-08-30 NOTE — ED PROVIDER NOTE - ATTENDING CONTRIBUTION TO CARE
48 y/o M pmh renal stones, HTN, lumbar disc herniation s/p discectomy, p/w sharp, colicky, intermittent left- sided flank pain w/ rad to suprapubic region x 2 wks. + hematuria on UA 1 wk ago. + voided renal stone 3 mo ago. + hx ureteral stenting. No trauma or strenuous activity. Pain is somewhat similar to prior stone, does not feel like herniation.  ROS PE above. IMP: renal stone vs MSK vs UTI. P: ua, ct abd, cbc, cmp, analgesia, reassess.

## 2019-08-30 NOTE — ED PROVIDER NOTE - CARE PLAN
Assessment and plan of treatment:	50 y/o M w/ h/o recurrent uroliths p/w L flank pain x 2 weeks.   Impression: renal stone vs MSK, more likely stone given pain location/description and positive UA 1 week ago.  Plan: pain control, labs, CT abd/pelvis Principal Discharge DX:	Kidney stones  Assessment and plan of treatment:	48 y/o M w/ h/o recurrent uroliths p/w L flank pain x 2 weeks.   Impression: renal stone vs MSK, more likely stone given pain location/description and positive UA 1 week ago.  Plan: pain control, labs, CT abd/pelvis

## 2019-08-30 NOTE — ED PROVIDER NOTE - PSH
Herniated lumbar intervertebral disc  discectomy  Kidney stone  ureteroscopy  Thrombus  thrombectomy of left leg

## 2019-08-30 NOTE — ED PROVIDER NOTE - PLAN OF CARE
48 y/o M w/ h/o recurrent uroliths p/w L flank pain x 2 weeks.   Impression: renal stone vs MSK, more likely stone given pain location/description and positive UA 1 week ago.  Plan: pain control, labs, CT abd/pelvis

## 2019-08-31 LAB
CULTURE RESULTS: NO GROWTH — SIGNIFICANT CHANGE UP
SPECIMEN SOURCE: SIGNIFICANT CHANGE UP

## 2019-09-10 PROBLEM — N20.0 CALCULUS OF KIDNEY: Chronic | Status: ACTIVE | Noted: 2019-08-30

## 2019-09-10 PROBLEM — M51.26 OTHER INTERVERTEBRAL DISC DISPLACEMENT, LUMBAR REGION: Chronic | Status: ACTIVE | Noted: 2019-08-30

## 2019-09-17 ENCOUNTER — APPOINTMENT (OUTPATIENT)
Dept: NEPHROLOGY | Facility: CLINIC | Age: 49
End: 2019-09-17
Payer: COMMERCIAL

## 2019-09-17 ENCOUNTER — OUTPATIENT (OUTPATIENT)
Dept: OUTPATIENT SERVICES | Facility: HOSPITAL | Age: 49
LOS: 1 days | Discharge: HOME | End: 2019-09-17

## 2019-09-17 VITALS
WEIGHT: 272 LBS | DIASTOLIC BLOOD PRESSURE: 89 MMHG | BODY MASS INDEX: 42.69 KG/M2 | HEIGHT: 67 IN | HEART RATE: 64 BPM | SYSTOLIC BLOOD PRESSURE: 154 MMHG

## 2019-09-17 DIAGNOSIS — M51.26 OTHER INTERVERTEBRAL DISC DISPLACEMENT, LUMBAR REGION: Chronic | ICD-10-CM

## 2019-09-17 DIAGNOSIS — I82.90 ACUTE EMBOLISM AND THROMBOSIS OF UNSPECIFIED VEIN: Chronic | ICD-10-CM

## 2019-09-17 DIAGNOSIS — N20.0 CALCULUS OF KIDNEY: Chronic | ICD-10-CM

## 2019-09-17 DIAGNOSIS — Z86.79 PERSONAL HISTORY OF OTHER DISEASES OF THE CIRCULATORY SYSTEM: ICD-10-CM

## 2019-09-17 PROCEDURE — 99212 OFFICE O/P EST SF 10 MIN: CPT | Mod: GC

## 2019-09-17 RX ORDER — MELOXICAM 15 MG/1
15 TABLET ORAL DAILY
Qty: 14 | Refills: 0 | Status: DISCONTINUED | COMMUNITY
Start: 2017-06-20 | End: 2019-09-17

## 2019-09-17 NOTE — HISTORY OF PRESENT ILLNESS
[FreeTextEntry1] : 49 years old male pt with past medical hx of obesity, HTN, recurrent kidney stones came for follow up.\par He was recently in ER in august 30 for kidney stones, where he presented with flank pain for 2 weeks with blood in urine, he was started on tamsulosin, his last episode was 3 months ago, had lithotripsy 10 years ago.\par he is a  by profession, drinks almost 10 glasses of water, denies any family h/o of kidney stones, denies any dry eyes, ulcers.

## 2019-09-17 NOTE — ASSESSMENT
[FreeTextEntry1] : 49 years old male pt with past medica; hx of HTN, prediabetes, recurrent kidney stones and obesity came for evaluation for kidney stones\par \par # kidney stones\par   will check ca, PTH\par   will do 24 hour urine chemistry\par   encourage to drink > 2l Of fluids\par   avoid excessive salt\par   continue tamsulosin for now\par \par # HTN\par    uncontrolled\par    will check urine proteinura\par    need evaluation for sleep apnea\par   will restart lisinopril for now\par \par # prediabetes\par    last hba1c 6.1\par   educated for dietary modification and low carbohydrate diet\par

## 2019-09-17 NOTE — PHYSICAL EXAM
[General Appearance - Alert] : alert [Sclera] : the sclera and conjunctiva were normal [Outer Ear] : the ears and nose were normal in appearance [Neck Appearance] : the appearance of the neck was normal [] : no respiratory distress [Apical Impulse] : the apical impulse was normal [Arterial Pulses Carotid] : carotid pulses were normal with no bruits [Bowel Sounds] : normal bowel sounds [Abdomen Soft] : soft [Abdomen Tenderness] : non-tender [Normal Sphincter Tone] : normal sphincter tone [Cervical Lymph Nodes Enlarged Posterior Bilaterally] : posterior cervical [No CVA Tenderness] : no ~M costovertebral angle tenderness [Abnormal Walk] : normal gait

## 2019-09-24 ENCOUNTER — APPOINTMENT (OUTPATIENT)
Dept: NEPHROLOGY | Facility: CLINIC | Age: 49
End: 2019-09-24

## 2020-03-06 ENCOUNTER — OUTPATIENT (OUTPATIENT)
Dept: OUTPATIENT SERVICES | Facility: HOSPITAL | Age: 50
LOS: 1 days | Discharge: HOME | End: 2020-03-06

## 2020-03-06 ENCOUNTER — APPOINTMENT (OUTPATIENT)
Dept: INTERNAL MEDICINE | Facility: CLINIC | Age: 50
End: 2020-03-06
Payer: SELF-PAY

## 2020-03-06 VITALS
HEART RATE: 90 BPM | DIASTOLIC BLOOD PRESSURE: 93 MMHG | SYSTOLIC BLOOD PRESSURE: 156 MMHG | BODY MASS INDEX: 42.69 KG/M2 | WEIGHT: 272 LBS | HEIGHT: 67 IN

## 2020-03-06 DIAGNOSIS — Z78.9 OTHER SPECIFIED HEALTH STATUS: ICD-10-CM

## 2020-03-06 DIAGNOSIS — M51.26 OTHER INTERVERTEBRAL DISC DISPLACEMENT, LUMBAR REGION: Chronic | ICD-10-CM

## 2020-03-06 DIAGNOSIS — N20.0 CALCULUS OF KIDNEY: Chronic | ICD-10-CM

## 2020-03-06 DIAGNOSIS — I82.90 ACUTE EMBOLISM AND THROMBOSIS OF UNSPECIFIED VEIN: Chronic | ICD-10-CM

## 2020-03-06 PROCEDURE — 99213 OFFICE O/P EST LOW 20 MIN: CPT | Mod: GC

## 2020-03-06 RX ORDER — SIMETHICONE 125 MG
125 TABLET,CHEWABLE ORAL
Qty: 30 | Refills: 5 | Status: DISCONTINUED | COMMUNITY
Start: 2017-05-19 | End: 2020-03-06

## 2020-03-06 RX ORDER — BIOTIN 10 MG
TABLET ORAL DAILY
Qty: 30 | Refills: 3 | Status: DISCONTINUED | COMMUNITY
Start: 2017-05-19 | End: 2020-03-06

## 2020-03-06 RX ORDER — LISINOPRIL 10 MG/1
10 TABLET ORAL DAILY
Qty: 30 | Refills: 5 | Status: DISCONTINUED | COMMUNITY
Start: 2017-03-07 | End: 2020-03-06

## 2020-03-06 RX ORDER — HYDROCORTISONE 1 %
12 CREAM (GRAM) TOPICAL TWICE DAILY
Qty: 2 | Refills: 2 | Status: DISCONTINUED | COMMUNITY
Start: 2017-05-19 | End: 2020-03-06

## 2020-03-06 RX ORDER — TAMSULOSIN HYDROCHLORIDE 0.4 MG/1
0.4 CAPSULE ORAL
Qty: 30 | Refills: 3 | Status: DISCONTINUED | COMMUNITY
Start: 2019-09-17 | End: 2020-03-06

## 2020-03-06 NOTE — PHYSICAL EXAM
[No Acute Distress] : no acute distress [Well Nourished] : well nourished [Well Developed] : well developed [No Respiratory Distress] : no respiratory distress  [No Accessory Muscle Use] : no accessory muscle use [Clear to Auscultation] : lungs were clear to auscultation bilaterally [Normal Rate] : normal rate  [Regular Rhythm] : with a regular rhythm [Normal S1, S2] : normal S1 and S2 [Soft] : abdomen soft [Non Tender] : non-tender [No HSM] : no HSM [Normal Bowel Sounds] : normal bowel sounds [Normal] : soft, non-tender, non-distended, no masses palpated, no HSM and normal bowel sounds [No Rash] : no rash [No Focal Deficits] : no focal deficits [Normal Insight/Judgement] : insight and judgment were intact

## 2020-03-06 NOTE — PLAN
[FreeTextEntry1] : 50 y/o HTN,  hx of kidney stones, lost to follow up, here for exam and med refills, c/o blurry vision and dizziness\par \par #HTN\par #blurry vision/dizziness \par -likely from uncontrolled htn, pt has been off meds for the past 2 months vs presbyopia \par -resume lisinopril 10 QD and BP readings at home\par -ophthal referral \par \par #GERD\par -start protonix 40 \par \par #hx of nephrolithiasis, asymtomatic\par -f/u Dr. Kleiner \par \par \par Flu shot given today 3/6/20\par \par RTC in 3 months, labs \par \par \par

## 2020-03-06 NOTE — HISTORY OF PRESENT ILLNESS
[FreeTextEntry1] : f/u  [de-identified] : 49 years old male pt with past medical hx of obesity, HTN, hx of nephrolithiasis here for blurry vision x 2 months with headaches, dizziness, denies chest pain, palpitations. Pt states that he has stopped his BP meds a couple months ago and hasn't had any refills and havent seen any doctors. Pt is also experiencing some heart burn ~3 times/week, denies wt loss. \par \par ROS+ wt gain, denies fever, chills, cough, diarrhea

## 2020-03-31 ENCOUNTER — OUTPATIENT (OUTPATIENT)
Dept: OUTPATIENT SERVICES | Facility: HOSPITAL | Age: 50
LOS: 1 days | Discharge: HOME | End: 2020-03-31

## 2020-03-31 ENCOUNTER — APPOINTMENT (OUTPATIENT)
Dept: INTERNAL MEDICINE | Facility: CLINIC | Age: 50
End: 2020-03-31
Payer: COMMERCIAL

## 2020-03-31 VITALS — BODY MASS INDEX: 42.69 KG/M2 | TEMPERATURE: 98.4 F | WEIGHT: 272 LBS | HEIGHT: 67 IN

## 2020-03-31 DIAGNOSIS — Z87.09 PERSONAL HISTORY OF OTHER DISEASES OF THE RESPIRATORY SYSTEM: ICD-10-CM

## 2020-03-31 DIAGNOSIS — N20.0 CALCULUS OF KIDNEY: Chronic | ICD-10-CM

## 2020-03-31 DIAGNOSIS — M51.26 OTHER INTERVERTEBRAL DISC DISPLACEMENT, LUMBAR REGION: Chronic | ICD-10-CM

## 2020-03-31 DIAGNOSIS — M54.9 DORSALGIA, UNSPECIFIED: ICD-10-CM

## 2020-03-31 DIAGNOSIS — I82.90 ACUTE EMBOLISM AND THROMBOSIS OF UNSPECIFIED VEIN: Chronic | ICD-10-CM

## 2020-03-31 PROCEDURE — 99212 OFFICE O/P EST SF 10 MIN: CPT | Mod: GC

## 2020-03-31 NOTE — ASSESSMENT
[FreeTextEntry1] : 49 year old male in as a walk in for complaint of sore throat and mild myalgia x 3 days and nasal congestion\par Patient with no temp, cough or GI symptoms.\par \par # Probable Viral pharyngitis - rapid strep test negative. Support care\par patient concerned that he has COVID-19. Suggested patient to monitor for development of other symptoms. If fever, cough etc. and symptoms mild could have covid testing.  If any severe symptoms occur patient advised to go to ER.\par \par \par # Acute back pain with radiation to LLE - NSAIDS x 1o days

## 2020-03-31 NOTE — HISTORY OF PRESENT ILLNESS
[FreeTextEntry1] : 49 year old male in as a walk in for complaint of sore throat and mild myalgia x 3 days. At home reports feeling warm but no temp done. No cough or other symptoms.  Works in a restaurant

## 2020-03-31 NOTE — PHYSICAL EXAM
[No Acute Distress] : no acute distress [Well Nourished] : well nourished [Well Developed] : well developed [Well-Appearing] : well-appearing [Normal Sclera/Conjunctiva] : normal sclera/conjunctiva [PERRL] : pupils equal round and reactive to light [EOMI] : extraocular movements intact [Normal Oropharynx] : the oropharynx was normal [No JVD] : no jugular venous distention [No Lymphadenopathy] : no lymphadenopathy [Supple] : supple [Thyroid Normal, No Nodules] : the thyroid was normal and there were no nodules present [No Respiratory Distress] : no respiratory distress  [No Accessory Muscle Use] : no accessory muscle use [Clear to Auscultation] : lungs were clear to auscultation bilaterally [Normal Rate] : normal rate  [Regular Rhythm] : with a regular rhythm [Normal S1, S2] : normal S1 and S2 [No Murmur] : no murmur heard [No Carotid Bruits] : no carotid bruits [No Abdominal Bruit] : a ~M bruit was not heard ~T in the abdomen [No Varicosities] : no varicosities [Pedal Pulses Present] : the pedal pulses are present [No Edema] : there was no peripheral edema [No Palpable Aorta] : no palpable aorta [No Extremity Clubbing/Cyanosis] : no extremity clubbing/cyanosis [Soft] : abdomen soft [Non Tender] : non-tender [Non-distended] : non-distended [No Masses] : no abdominal mass palpated [No HSM] : no HSM [Normal Bowel Sounds] : normal bowel sounds [Normal Posterior Cervical Nodes] : no posterior cervical lymphadenopathy [Normal Anterior Cervical Nodes] : no anterior cervical lymphadenopathy [No CVA Tenderness] : no CVA  tenderness [No Spinal Tenderness] : no spinal tenderness [No Joint Swelling] : no joint swelling [Grossly Normal Strength/Tone] : grossly normal strength/tone [No Rash] : no rash [Coordination Grossly Intact] : coordination grossly intact [No Focal Deficits] : no focal deficits [Normal Gait] : normal gait [Deep Tendon Reflexes (DTR)] : deep tendon reflexes were 2+ and symmetric [Normal Affect] : the affect was normal [Normal Insight/Judgement] : insight and judgment were intact [de-identified] : mild throat erythema no exudates or adenopathy

## 2020-04-02 DIAGNOSIS — M54.9 DORSALGIA, UNSPECIFIED: ICD-10-CM

## 2020-04-02 DIAGNOSIS — J02.9 ACUTE PHARYNGITIS, UNSPECIFIED: ICD-10-CM

## 2020-04-02 LAB
ALBUMIN SERPL ELPH-MCNC: 4.1 G/DL
ALP BLD-CCNC: 93 U/L
ALT SERPL-CCNC: 22 U/L
ANION GAP SERPL CALC-SCNC: 13 MMOL/L
AST SERPL-CCNC: 17 U/L
BASOPHILS # BLD AUTO: 0.03 K/UL
BASOPHILS NFR BLD AUTO: 0.4 %
BILIRUB SERPL-MCNC: 0.3 MG/DL
BUN SERPL-MCNC: 12 MG/DL
CALCIUM SERPL-MCNC: 9 MG/DL
CHLORIDE SERPL-SCNC: 103 MMOL/L
CHOLEST SERPL-MCNC: 158 MG/DL
CHOLEST/HDLC SERPL: 4.2 RATIO
CO2 SERPL-SCNC: 25 MMOL/L
CREAT SERPL-MCNC: 1.1 MG/DL
EOSINOPHIL # BLD AUTO: 0.23 K/UL
EOSINOPHIL NFR BLD AUTO: 3.2 %
ESTIMATED AVERAGE GLUCOSE: 128 MG/DL
GLUCOSE SERPL-MCNC: 103 MG/DL
HBA1C MFR BLD HPLC: 6.1 %
HCT VFR BLD CALC: 42.6 %
HDLC SERPL-MCNC: 38 MG/DL
HGB BLD-MCNC: 13.6 G/DL
IMM GRANULOCYTES NFR BLD AUTO: 0.4 %
LDLC SERPL CALC-MCNC: 115 MG/DL
LYMPHOCYTES # BLD AUTO: 2.77 K/UL
LYMPHOCYTES NFR BLD AUTO: 38.2 %
MAN DIFF?: NORMAL
MCHC RBC-ENTMCNC: 26.9 PG
MCHC RBC-ENTMCNC: 31.9 G/DL
MCV RBC AUTO: 84.2 FL
MONOCYTES # BLD AUTO: 0.61 K/UL
MONOCYTES NFR BLD AUTO: 8.4 %
NEUTROPHILS # BLD AUTO: 3.58 K/UL
NEUTROPHILS NFR BLD AUTO: 49.4 %
PLATELET # BLD AUTO: 235 K/UL
POTASSIUM SERPL-SCNC: 4.7 MMOL/L
PROT SERPL-MCNC: 7.4 G/DL
RBC # BLD: 5.06 M/UL
RBC # FLD: 14.6 %
SODIUM SERPL-SCNC: 141 MMOL/L
TRIGL SERPL-MCNC: 115 MG/DL
TSH SERPL-ACNC: 2.88 UIU/ML
WBC # FLD AUTO: 7.25 K/UL

## 2020-04-08 ENCOUNTER — APPOINTMENT (OUTPATIENT)
Dept: INTERNAL MEDICINE | Facility: CLINIC | Age: 50
End: 2020-04-08

## 2020-06-12 ENCOUNTER — APPOINTMENT (OUTPATIENT)
Dept: INTERNAL MEDICINE | Facility: CLINIC | Age: 50
End: 2020-06-12

## 2020-06-26 ENCOUNTER — INPATIENT (INPATIENT)
Facility: HOSPITAL | Age: 50
LOS: 13 days | Discharge: NURSING, REHAB OR LONG-TERM CARE | End: 2020-07-10
Attending: HOSPITALIST | Admitting: HOSPITALIST
Payer: SUBSIDIZED

## 2020-06-26 VITALS
HEART RATE: 84 BPM | TEMPERATURE: 97 F | DIASTOLIC BLOOD PRESSURE: 94 MMHG | OXYGEN SATURATION: 100 % | RESPIRATION RATE: 17 BRPM | SYSTOLIC BLOOD PRESSURE: 142 MMHG

## 2020-06-26 DIAGNOSIS — N20.0 CALCULUS OF KIDNEY: Chronic | ICD-10-CM

## 2020-06-26 DIAGNOSIS — M51.26 OTHER INTERVERTEBRAL DISC DISPLACEMENT, LUMBAR REGION: Chronic | ICD-10-CM

## 2020-06-26 DIAGNOSIS — I82.90 ACUTE EMBOLISM AND THROMBOSIS OF UNSPECIFIED VEIN: Chronic | ICD-10-CM

## 2020-06-26 DIAGNOSIS — R09.89 OTHER SPECIFIED SYMPTOMS AND SIGNS INVOLVING THE CIRCULATORY AND RESPIRATORY SYSTEMS: ICD-10-CM

## 2020-06-26 LAB
ALBUMIN SERPL ELPH-MCNC: 4.1 G/DL — SIGNIFICANT CHANGE UP (ref 3.5–5.2)
ALP SERPL-CCNC: 77 U/L — SIGNIFICANT CHANGE UP (ref 30–115)
ALT FLD-CCNC: 37 U/L — SIGNIFICANT CHANGE UP (ref 0–41)
ANION GAP SERPL CALC-SCNC: 13 MMOL/L — SIGNIFICANT CHANGE UP (ref 7–14)
APPEARANCE UR: CLEAR — SIGNIFICANT CHANGE UP
AST SERPL-CCNC: 30 U/L — SIGNIFICANT CHANGE UP (ref 0–41)
BASOPHILS # BLD AUTO: 0.04 K/UL — SIGNIFICANT CHANGE UP (ref 0–0.2)
BASOPHILS NFR BLD AUTO: 0.4 % — SIGNIFICANT CHANGE UP (ref 0–1)
BILIRUB SERPL-MCNC: 0.7 MG/DL — SIGNIFICANT CHANGE UP (ref 0.2–1.2)
BILIRUB UR-MCNC: NEGATIVE — SIGNIFICANT CHANGE UP
BUN SERPL-MCNC: 35 MG/DL — HIGH (ref 10–20)
CALCIUM SERPL-MCNC: 9.1 MG/DL — SIGNIFICANT CHANGE UP (ref 8.5–10.1)
CHLORIDE SERPL-SCNC: 99 MMOL/L — SIGNIFICANT CHANGE UP (ref 98–110)
CO2 SERPL-SCNC: 24 MMOL/L — SIGNIFICANT CHANGE UP (ref 17–32)
COLOR SPEC: YELLOW — SIGNIFICANT CHANGE UP
CREAT SERPL-MCNC: 0.9 MG/DL — SIGNIFICANT CHANGE UP (ref 0.7–1.5)
DIFF PNL FLD: NEGATIVE — SIGNIFICANT CHANGE UP
EOSINOPHIL # BLD AUTO: 0.32 K/UL — SIGNIFICANT CHANGE UP (ref 0–0.7)
EOSINOPHIL NFR BLD AUTO: 3.1 % — SIGNIFICANT CHANGE UP (ref 0–8)
GLUCOSE SERPL-MCNC: 105 MG/DL — HIGH (ref 70–99)
GLUCOSE UR QL: NEGATIVE — SIGNIFICANT CHANGE UP
HCT VFR BLD CALC: 48.3 % — SIGNIFICANT CHANGE UP (ref 42–52)
HGB BLD-MCNC: 16.9 G/DL — SIGNIFICANT CHANGE UP (ref 14–18)
IMM GRANULOCYTES NFR BLD AUTO: 1 % — HIGH (ref 0.1–0.3)
KETONES UR-MCNC: NEGATIVE — SIGNIFICANT CHANGE UP
LEUKOCYTE ESTERASE UR-ACNC: NEGATIVE — SIGNIFICANT CHANGE UP
LIDOCAIN IGE QN: 52 U/L — SIGNIFICANT CHANGE UP (ref 7–60)
LYMPHOCYTES # BLD AUTO: 3.1 K/UL — SIGNIFICANT CHANGE UP (ref 1.2–3.4)
LYMPHOCYTES # BLD AUTO: 30 % — SIGNIFICANT CHANGE UP (ref 20.5–51.1)
MCHC RBC-ENTMCNC: 30.2 PG — SIGNIFICANT CHANGE UP (ref 27–31)
MCHC RBC-ENTMCNC: 35 G/DL — SIGNIFICANT CHANGE UP (ref 32–37)
MCV RBC AUTO: 86.3 FL — SIGNIFICANT CHANGE UP (ref 80–94)
MONOCYTES # BLD AUTO: 0.89 K/UL — HIGH (ref 0.1–0.6)
MONOCYTES NFR BLD AUTO: 8.6 % — SIGNIFICANT CHANGE UP (ref 1.7–9.3)
NEUTROPHILS # BLD AUTO: 5.87 K/UL — SIGNIFICANT CHANGE UP (ref 1.4–6.5)
NEUTROPHILS NFR BLD AUTO: 56.9 % — SIGNIFICANT CHANGE UP (ref 42.2–75.2)
NITRITE UR-MCNC: NEGATIVE — SIGNIFICANT CHANGE UP
NRBC # BLD: 0 /100 WBCS — SIGNIFICANT CHANGE UP (ref 0–0)
PH UR: 5.5 — SIGNIFICANT CHANGE UP (ref 5–8)
PLATELET # BLD AUTO: 170 K/UL — SIGNIFICANT CHANGE UP (ref 130–400)
POTASSIUM SERPL-MCNC: 4.9 MMOL/L — SIGNIFICANT CHANGE UP (ref 3.5–5)
POTASSIUM SERPL-SCNC: 4.9 MMOL/L — SIGNIFICANT CHANGE UP (ref 3.5–5)
PROT SERPL-MCNC: 7.3 G/DL — SIGNIFICANT CHANGE UP (ref 6–8)
PROT UR-MCNC: SIGNIFICANT CHANGE UP
RBC # BLD: 5.6 M/UL — SIGNIFICANT CHANGE UP (ref 4.7–6.1)
RBC # FLD: 14.5 % — SIGNIFICANT CHANGE UP (ref 11.5–14.5)
SODIUM SERPL-SCNC: 136 MMOL/L — SIGNIFICANT CHANGE UP (ref 135–146)
SP GR SPEC: 1.02 — SIGNIFICANT CHANGE UP (ref 1.01–1.02)
UROBILINOGEN FLD QL: SIGNIFICANT CHANGE UP
WBC # BLD: 10.32 K/UL — SIGNIFICANT CHANGE UP (ref 4.8–10.8)
WBC # FLD AUTO: 10.32 K/UL — SIGNIFICANT CHANGE UP (ref 4.8–10.8)

## 2020-06-26 PROCEDURE — 99282 EMERGENCY DEPT VISIT SF MDM: CPT

## 2020-06-26 PROCEDURE — 93970 EXTREMITY STUDY: CPT | Mod: 26

## 2020-06-26 PROCEDURE — 99222 1ST HOSP IP/OBS MODERATE 55: CPT

## 2020-06-26 PROCEDURE — 99223 1ST HOSP IP/OBS HIGH 75: CPT

## 2020-06-26 PROCEDURE — 72146 MRI CHEST SPINE W/O DYE: CPT | Mod: 26

## 2020-06-26 PROCEDURE — 99285 EMERGENCY DEPT VISIT HI MDM: CPT

## 2020-06-26 PROCEDURE — 72148 MRI LUMBAR SPINE W/O DYE: CPT | Mod: 26

## 2020-06-26 PROCEDURE — 93010 ELECTROCARDIOGRAM REPORT: CPT

## 2020-06-26 PROCEDURE — 71045 X-RAY EXAM CHEST 1 VIEW: CPT | Mod: 26

## 2020-06-26 RX ORDER — GABAPENTIN 400 MG/1
100 CAPSULE ORAL THREE TIMES A DAY
Refills: 0 | Status: DISCONTINUED | OUTPATIENT
Start: 2020-06-26 | End: 2020-06-28

## 2020-06-26 RX ORDER — PANTOPRAZOLE SODIUM 20 MG/1
40 TABLET, DELAYED RELEASE ORAL
Refills: 0 | Status: DISCONTINUED | OUTPATIENT
Start: 2020-06-26 | End: 2020-06-28

## 2020-06-26 RX ORDER — POLYETHYLENE GLYCOL 3350 17 G/17G
17 POWDER, FOR SOLUTION ORAL DAILY
Refills: 0 | Status: DISCONTINUED | OUTPATIENT
Start: 2020-06-26 | End: 2020-06-28

## 2020-06-26 RX ORDER — METHOCARBAMOL 500 MG/1
1000 TABLET, FILM COATED ORAL ONCE
Refills: 0 | Status: COMPLETED | OUTPATIENT
Start: 2020-06-26 | End: 2020-06-26

## 2020-06-26 RX ORDER — DEXAMETHASONE 0.5 MG/5ML
4 ELIXIR ORAL EVERY 6 HOURS
Refills: 0 | Status: DISCONTINUED | OUTPATIENT
Start: 2020-06-26 | End: 2020-06-27

## 2020-06-26 RX ORDER — KETOROLAC TROMETHAMINE 30 MG/ML
15 SYRINGE (ML) INJECTION ONCE
Refills: 0 | Status: DISCONTINUED | OUTPATIENT
Start: 2020-06-26 | End: 2020-06-26

## 2020-06-26 RX ORDER — ENOXAPARIN SODIUM 100 MG/ML
120 INJECTION SUBCUTANEOUS ONCE
Refills: 0 | Status: COMPLETED | OUTPATIENT
Start: 2020-06-26 | End: 2020-06-26

## 2020-06-26 RX ORDER — LIDOCAINE 4 G/100G
1 CREAM TOPICAL DAILY
Refills: 0 | Status: DISCONTINUED | OUTPATIENT
Start: 2020-06-26 | End: 2020-06-28

## 2020-06-26 RX ORDER — ACETAMINOPHEN 500 MG
650 TABLET ORAL EVERY 6 HOURS
Refills: 0 | Status: DISCONTINUED | OUTPATIENT
Start: 2020-06-26 | End: 2020-06-28

## 2020-06-26 RX ORDER — MORPHINE SULFATE 50 MG/1
4 CAPSULE, EXTENDED RELEASE ORAL ONCE
Refills: 0 | Status: DISCONTINUED | OUTPATIENT
Start: 2020-06-26 | End: 2020-06-26

## 2020-06-26 RX ORDER — SODIUM CHLORIDE 9 MG/ML
1000 INJECTION INTRAMUSCULAR; INTRAVENOUS; SUBCUTANEOUS
Refills: 0 | Status: COMPLETED | OUTPATIENT
Start: 2020-06-26 | End: 2020-06-26

## 2020-06-26 RX ORDER — METHOCARBAMOL 500 MG/1
750 TABLET, FILM COATED ORAL THREE TIMES A DAY
Refills: 0 | Status: DISCONTINUED | OUTPATIENT
Start: 2020-06-26 | End: 2020-06-28

## 2020-06-26 RX ORDER — ENOXAPARIN SODIUM 100 MG/ML
120 INJECTION SUBCUTANEOUS EVERY 12 HOURS
Refills: 0 | Status: DISCONTINUED | OUTPATIENT
Start: 2020-06-27 | End: 2020-06-27

## 2020-06-26 RX ORDER — DEXAMETHASONE 0.5 MG/5ML
10 ELIXIR ORAL EVERY 6 HOURS
Refills: 0 | Status: DISCONTINUED | OUTPATIENT
Start: 2020-06-26 | End: 2020-06-26

## 2020-06-26 RX ORDER — LISINOPRIL 2.5 MG/1
10 TABLET ORAL DAILY
Refills: 0 | Status: DISCONTINUED | OUTPATIENT
Start: 2020-06-26 | End: 2020-06-28

## 2020-06-26 RX ORDER — DEXAMETHASONE 0.5 MG/5ML
10 ELIXIR ORAL ONCE
Refills: 0 | Status: COMPLETED | OUTPATIENT
Start: 2020-06-26 | End: 2020-06-26

## 2020-06-26 RX ORDER — SENNA PLUS 8.6 MG/1
2 TABLET ORAL AT BEDTIME
Refills: 0 | Status: DISCONTINUED | OUTPATIENT
Start: 2020-06-26 | End: 2020-06-28

## 2020-06-26 RX ADMIN — METHOCARBAMOL 750 MILLIGRAM(S): 500 TABLET, FILM COATED ORAL at 22:47

## 2020-06-26 RX ADMIN — Medication 15 MILLIGRAM(S): at 11:57

## 2020-06-26 RX ADMIN — Medication 4 MILLIGRAM(S): at 20:07

## 2020-06-26 RX ADMIN — SODIUM CHLORIDE 75 MILLILITER(S): 9 INJECTION INTRAMUSCULAR; INTRAVENOUS; SUBCUTANEOUS at 20:09

## 2020-06-26 RX ADMIN — METHOCARBAMOL 1000 MILLIGRAM(S): 500 TABLET, FILM COATED ORAL at 11:58

## 2020-06-26 RX ADMIN — ENOXAPARIN SODIUM 120 MILLIGRAM(S): 100 INJECTION SUBCUTANEOUS at 16:13

## 2020-06-26 RX ADMIN — GABAPENTIN 100 MILLIGRAM(S): 400 CAPSULE ORAL at 22:47

## 2020-06-26 RX ADMIN — MORPHINE SULFATE 4 MILLIGRAM(S): 50 CAPSULE, EXTENDED RELEASE ORAL at 21:43

## 2020-06-26 RX ADMIN — Medication 1 MILLIGRAM(S): at 20:44

## 2020-06-26 RX ADMIN — POLYETHYLENE GLYCOL 3350 17 GRAM(S): 17 POWDER, FOR SOLUTION ORAL at 20:13

## 2020-06-26 RX ADMIN — MORPHINE SULFATE 4 MILLIGRAM(S): 50 CAPSULE, EXTENDED RELEASE ORAL at 11:58

## 2020-06-26 RX ADMIN — Medication 102 MILLIGRAM(S): at 11:58

## 2020-06-26 NOTE — ED ADULT TRIAGE NOTE - CHIEF COMPLAINT QUOTE
patient c/o severe back pain and weakness in b/l legs for one month. unable to ambulate. has been in [place] for one month, came back yesterday

## 2020-06-26 NOTE — CHART NOTE - NSCHARTNOTEFT_GEN_A_CORE
Spoke to radiologist.   Patient has acute l2, l3 disk herniation with compression.     On physical exam patient as BLE weakness  Still has bowel and urine control    Spoke to neurosurgery on call ([id number])  - continue dexamethasome   - strict bedrest  - apply back brace   - neurosx will follow Spoke to radiologist.   Patient has acute l2, l3 disk herniation with compression.     On physical exam patient as BLE weakness  Still has bowel and urine control    Spoke to neurosurgery on call ([phone])  - continue dexamethasome   - strict bedrest  - apply back brace   - laminectomy planned for tomorrow  - NPO aftermidnight  - WILL NEED MEDICAL CLEARANCE AND CARDIOLOGY CLEARANCE AS PER NEUROSX TEAM  - hold AM lovenox (theraputic dose- benedicto DVT) Spoke to radiologist.   Patient has acute l2, l3 disk herniation with compression.     On physical exam patient as BLE weakness  Still has bowel and urine control    Spoke to neurosurgery on call ([id number])  - continue dexamethasome   - strict bedrest  - apply back brace   - laminectomy planned for tomorrow  - NPO aftermidnight  - WILL NEED MEDICAL CLEARANCE AND CARDIOLOGY CLEARANCE AS PER NEUROSX TEAM  - hold AM lovenox (theraputic dose- for DVT) Spoke to radiologist.   Patient has acute l2, l3 disk herniation with compression.     On physical exam patient as BLE weakness  Still has bowel and urine control    Spoke to neurosurgery on call ([id number])  - continue dexamethasome   - strict bedrest  - apply back brace   - laminectomy planned for tomorrow  - NPO aftermidnight  - WILL NEED MEDICAL CLEARANCE AS PER NEUROSX TEAM  - hold AM lovenox (theraputic dose- for DVT) Spoke to radiologist.   Patient has acute l2, l3 disk herniation with compression.     On physical exam patient as BLE weakness  Still has bowel and urine control    Spoke to neurosurgery on call ([phone])  - continue dexamethasome   - strict bedrest  - apply back brace   - laminectomy planned for tomorrow  - NPO aftermidnight  - WILL NEED MEDICAL CLEARANCE AS PER NEUROSX TEAM (spoke to On call Internist)   - hold AM lovenox (theraputic dose- for DVT) Spoke to radiologist.   Patient has acute l2, l3 disk herniation with compression.     On physical exam patient as BLE weakness  Still has bowel and urine control    Spoke to neurosurgery on call ([phone])  - continue dexamethasome   - strict bedrest  - apply back brace   - laminectomy planned for tomorrow  - NPO aftermidnight  - WILL NEED MEDICAL CLEARANCE AS PER NEUROSX TEAM (spoke to On call Internist)   - hold AM lovenox (theraputic dose- for DVT)    Spoke to patients daughter CM on # [phone]  Updated on plan Spoke to radiologist.   Patient has acute l2, l3 disk herniation with compression.     On physical exam patient as BLE weakness  Still has bowel and urine control    Spoke to neurosurgery on call ([phone])  - continue dexamethasome   - STRICT BEDREST  - apply back brace   - laminectomy planned for tomorrow  - NPO aftermidnight  - WILL NEED MEDICAL CLEARANCE AS PER NEUROSX TEAM (spoke to On call Internist)   - hold AM lovenox (theraputic dose- for DVT)    Spoke to patients daughter CM on # [phone]  Updated on plan

## 2020-06-26 NOTE — ED PROVIDER NOTE - NS ED ROS FT
Constitutional: See HPI.  Eyes: No visual changes, eye pain or discharge.   ENMT: No hearing changes, pain, discharge or infections.   Cardiac: No SOB or edema. No chest pain with exertion.  Respiratory: No cough or respiratory distress.   GI: No nausea, vomiting, diarrhea or abdominal pain.  : No dysuria, frequency or burning. No Discharge  MS: + back pain. No myalgia, muscle weakness, joint pain.  Neuro: + paresthesias. No headache or weakness.   Skin: No skin rash.  Except as documented in the HPI, all other systems are negative.

## 2020-06-26 NOTE — ED PROVIDER NOTE - ATTENDING CONTRIBUTION TO CARE
I personally evaluated the patient. I reviewed the Physician Assistant’s note (as assigned above), and agree with the findings and plan except as documented in my note.     51 y/o M with PMH of kidney stones, HTN, lumbar disc herniation s/p discectomy in 2019 done in [place], and venous stasis presents c/o worsening b/l lower back pain, R>L, x 1 mo and inability to ambulate due to leg weakness. Pt has been in [place] during this time had XR done there which was nl. Pt returned last night and came to ED today. In ED, pt reports b/l LE weakness, burning sensation down the back of his R leg and tingling sensation in R leg. No change in urination or bowel habits, fevers or recent illness.    CONSTITUTIONAL: Well-developed; well-nourished; in no acute distress. NEURO: Alert, oriented, grossly unremarkable. PSYCH: Cooperative, appropriate.. SKIN: warm, dry. HEAD: Normocephalic; atraumatic. EYES: PERRL, EOMI, no conjunctival erythema. ENT: No nasal discharge; airway clear. NECK: Supple; non tender. CARD: S1, S2 normal; Regular rate and rhythm. RESP: No wheezes, rales or rhonchi. ABD: soft non tender, non distended, no rebound or guarding. Normal rectal exam. BACK: (+) point TTP over lumbar sacral area b/l. EXT: Normal ROM. 2+ pulses.  LYMPH: No acute cervical lymphadenopathy. NEURO: 2/5 strength in b/l LE, decreased sensation on lateral aspect of RLE,. no saddle anesthesia.   Plan for labs, urine, and neurosurgery eval.

## 2020-06-26 NOTE — H&P ADULT - NSHPPHYSICALEXAM_GEN_ALL_CORE
PHYSICAL EXAM:  GENERAL: NAD, lying in bed comfortably  HEAD:  Atraumatic, Normocephalic  EYES: EOMI, PERRLA, conjunctiva and sclera clear  ENT: Moist mucous membranes  NECK: Supple, No JVD  CHEST/LUNG: Clear to auscultation bilaterally; No rales, rhonchi, wheezing, or rubs. Unlabored respirations  HEART: Regular rate and rhythm; No murmurs, rubs, or gallops  EXTREMITIES:  2+ Peripheral Pulses, brisk capillary refill, +edema. No clubbing, cyanosis  NERVOUS SYSTEM:  Alert & Oriented X3, speech clear. No deficits   MSK: B/L lower extremity weakness 4/5  SKIN: discoloration on b/l lower extremities, venous stasis +

## 2020-06-26 NOTE — ED PROVIDER NOTE - CLINICAL SUMMARY MEDICAL DECISION MAKING FREE TEXT BOX
Patient presents with back pain and inability to ambulate. labs, cxr, duplex done. Found to have a dvt, lovenox started. Neurosurgery consulted for back recommending admission, MRI and decadron. Patient admitted for further management.

## 2020-06-26 NOTE — H&P ADULT - ASSESSMENT
51 y/o male with PMH of kidney stones, HTN, lumbar disc herniation s/p discectomy in 2019, and venous stasis presents with c/o back pain for 6 months which has progressive worsened over the past month radiating to lower limbs (R>L), inability to ambulate due to leg weakness and constipation liked due to degenerative spine disease vs less likely cord compression associated with new onset DVT    # Acute on chronic back pain  - Dexamethasone 10 mg IV  - Follow up MRI to r/o spinal cord compression  - Follow neurosurgery recs  - Tylenlol 650 mg prn  - Lidocaine path  - Robaxin 49 y/o male with PMH of kidney stones, HTN, lumbar disc herniation s/p discectomy in 2019, and venous stasis presents with c/o back pain for 6 months which has progressive worsened over the past month radiating to lower limbs (R>L), inability to ambulate due to leg weakness and constipation liked due to degenerative spine disease vs less likely cord compression associated with new onset DVT    # Acute on chronic back pain  - Dexamethasone 10 mg IV  - Follow up MRI to r/o spinal cord compression  - Follow neurosurgery recs  - Tylenlol 650 mg prn  - Lidocaine path  - Robaxin     # Deep Vein Thrombosis   - Follow up with duplex ultrasound  - Enoxaparin 120 mg   - CT angiography to r/o PE  - Order coagulation studies    # Constipation likely from opioid use  - Senna   - Miralax     # HTN  - continue home lisinopril     Diet: DASH   Activity: As tolerated; pending PT evaluation  DVT Prophylaxis: on therapeutic Lovanox   GI Prophylaxis: continue pantoprozole   Code Status: Full code   Disposition: Home pending clinical improvement   Contact: Suzan (wife) [phone] 49 y/o male with PMH of kidney stones, HTN, lumbar disc herniation s/p discectomy in 2019, and venous stasis presents with c/o back pain for 6 months which has progressive worsened over the past month radiating to lower limbs (R>L), inability to ambulate due to leg weakness and constipation liked due to degenerative spine disease vs less likely cord compression associated with new onset DVT    # Acute on chronic back pain  - Dexamethasone 10 mg IV q6 per neurosurgery  - Follow up thoracic/lumbar spine MRI to r/o spinal cord compression  - Follow neurosurgery recs  - Tylenol 650 mg prn  - Lidocaine patch on site of back pain  - Robaxin 750 mg tid  - Gabapentin 100 mg three x day    # Left lower extremity acute deep vein thrombosis   - Follow up with duplex ultrasound (prelim is positive for left femoral dvt but follow up for official read)   - Enoxaparin 120 mg subcutaneous BID  - Chest CT angiography to r/o PE  - Order coagulation studies in the AM    # Constipation likely from opioid medication at home for back pain  - Senna 2 tablets  - Miralax 17 grams once/day    # HTN  - stable blood pressure 142/94 mm hg  - continue home lisinopril 10 mg    Diet: DASH   Activity: As tolerated; pending PT evaluation  DVT Prophylaxis: on therapeutic Lovanox   GI Prophylaxis: continue pantoprazole 40 mg q day  Code Status: Full code   Disposition: Home pending clinical improvement   Contact: Roquevalerie (wife) [phone]

## 2020-06-26 NOTE — ED PROVIDER NOTE - CARE PLAN
Principal Discharge DX:	Back pain  Secondary Diagnosis:	Paresthesias  Secondary Diagnosis:	Ambulatory dysfunction  Secondary Diagnosis:	DVT (deep venous thrombosis)

## 2020-06-26 NOTE — ED ADULT NURSE NOTE - OBJECTIVE STATEMENT
Patient presenting to ED with c/o worsening lower back pain for about 2 months, radiating to b/l legs. Patient also c/o inability to ambulate and constipation.

## 2020-06-26 NOTE — H&P ADULT - NSHPLABSRESULTS_GEN_ALL_CORE
Complete Blood Count + Automated Diff (06.26.20 @ 10:30)    WBC Count: 10.32 K/uL    RBC Count: 5.60 M/uL    Hemoglobin: 16.9 g/dL    Hematocrit: 48.3 %    Mean Cell Volume: 86.3 fL    Mean Cell Hemoglobin: 30.2 pg    Mean Cell Hemoglobin Conc: 35.0 g/dL    Red Cell Distrib Width: 14.5 %    Platelet Count - Automated: 170 K/uL    Auto Neutrophil #: 5.87 K/uL    Auto Lymphocyte #: 3.10 K/uL    Auto Monocyte #: 0.89 K/uL    Auto Eosinophil #: 0.32 K/uL    Auto Basophil #: 0.04 K/uL    Auto Neutrophil %: 56.9: Differential percentages must be correlated with absolute numbers for  clinical significance. %    Auto Lymphocyte %: 30.0 %    Auto Monocyte %: 8.6 %    Auto Eosinophil %: 3.1 %    Auto Basophil %: 0.4 %    Auto Immature Granulocyte %: 1.0 %    Nucleated RBC: 0 /100 WBCs      Comprehensive Metabolic Panel (06.26.20 @ 10:30)    Sodium, Serum: 136 mmol/L    Potassium, Serum: 4.9: Hemolyzed. Interpret with caution mmol/L    Chloride, Serum: 99 mmol/L    Carbon Dioxide, Serum: 24 mmol/L    Anion Gap, Serum: 13 mmol/L    Blood Urea Nitrogen, Serum: 35 mg/dL    Creatinine, Serum: 0.9 mg/dL    Glucose, Serum: 105 mg/dL    Calcium, Total Serum: 9.1 mg/dL    Protein Total, Serum: 7.3 g/dL    Albumin, Serum: 4.1 g/dL    Bilirubin Total, Serum: 0.7 mg/dL    Alkaline Phosphatase, Serum: 77 U/L    Aspartate Aminotransferase (AST/SGOT): 30: Hemolyzed. Interpret with caution U/L    Alanine Aminotransferase (ALT/SGPT): 37: Hemolyzed. Interpret with caution U/L    eGFR if Non : 99: Interpretative comment  The units for eGFR are mL/min/1.73M2 (normalized body surface area). The  eGFR is calculated from a serum creatinine using the CKD-EPI equation.  Other variables required for calculation are race, age and sex. Among  patients with chronic kidney disease (CKD), the eGFR is useful in  determining the stage of disease according to KDOQI CKD classification.  All eGFR results are reported numerically with the following  interpretation.          GFR                    With                 Without     (ml/min/1.73 m2)    Kidney Damage       Kidney Damage        >= 90                    Stage 1                     Normal        60-89                    Stage 2                     Decreased GFR        30-59     Stage 3                     Stage 3        15-29                    Stage 4                     Stage 4        < 15                      Stage 5                     Stage 5  Each stage of CKD assumes that the associated GFR level has been in  effect for at least 3 months. Determination of stages one and two (with  eGFR > 59 ml/min/m2) requires estimation of kidney damage for at least 3  months as defined by structural or functional abnormalities.  Limitations: All estimates of GFR will be less accurate for patients at  extremes of muscle mass (including but not limited to frail elderly,  critically ill, or cancer patients), those with unusual diets, and those  with conditions associated with reduced secretion or extrarenal  elimination of creatinine. The eGFR equation is not recommended for use  in patients with unstable creatinine levels. mL/min/1.73M2    eGFR if African American: 115 mL/min/1.73M2      < from: Xray Chest 1 View AP/PA (06.26.20 @ 10:55) >    Impression:      No radiographic evidence of acute cardiopulmonary disease.    < end of copied text >      < from: 12 Lead ECG (06.26.20 @ 10:33) >    Diagnosis Line Normal sinus rhythm  Normal ECG    < end of copied text >

## 2020-06-26 NOTE — CONSULT NOTE ADULT - SUBJECTIVE AND OBJECTIVE BOX
Patient is a 50y old  Male who presents with a chief complaint of inability to walk and lower extremity weakness    HPI: 51 yo male with PMHx of kidney stones, HTN, hx of laminectomy 2/2 lumbar disc herniation, BRISA, venous stasis who presented to ED for evaluation of low back pain and bilateral lower extremity weakness, progressively worsening over past month. Patient states the symptoms presented with gradual onset, initially mild progressing to severe. Describes pain as constant, radiating down bilateral lower extremities R>L.  Denies bowel/bladder incontinence but admits to recent constipation.   Denies hx of trauma/falls.   Also c/o numbness/tingling in RLE from knee to toes.  Patient states that he has been unable to ambulate x3 weeks.  Patient states 2 weeks ago, he experienced a episode of sensory loss in groin region, but that symptom resolved spontaneously.    Currently patient states he's unable to move his legs,       PAST MEDICAL & SURGICAL HISTORY:  Lumbar disc herniation  Kidney stones  HTN (hypertension)  Herniated lumbar intervertebral disc: discectomy  Kidney stone: ureteroscopy  Thrombus: thrombectomy of left leg    FAMILY HISTORY:      SOCIAL HISTORY:  Tobacco Use:  EtOH use:   Substance:    Allergies    No Known Allergies    Intolerances        REVIEW OF SYSTEMS  Negative except as noted in HPI  CONSTITUTIONAL: No fever, weight loss, or fatigue  EYES: No eye pain, visual disturbances, or discharge  ENMT:  No difficulty hearing, tinnitus, vertigo; No sinus or throat pain  NECK: No pain or stiffness  RESPIRATORY: No cough, wheezing, chills or hemoptysis; No shortness of breath  CARDIOVASCULAR: No chest pain, palpitations, dizziness, or leg swelling  GASTROINTESTINAL: No abdominal or epigastric pain. No nausea, vomiting, or hematemesis; No diarrhea or constipation. No melena or hematochezia.  GENITOURINARY: No dysuria, frequency, hematuria, or incontinence  NEUROLOGICAL: + LE weakness  SKIN: No itching, burning, rashes, or lesions   LYMPH NODES: No enlarged glands  ENDOCRINE: No heat or cold intolerance; No hair loss  MUSCULOSKELETAL: No joint pain or swelling; No muscle, back, or extremity pain  PSYCHIATRIC: No depression, anxiety, mood swings, or difficulty sleeping  HEME/LYMPH: No easy bruising, or bleeding gums  ALLERY AND IMMUNOLOGIC: No hives or eczema    HOME MEDICATIONS:  Home Medications:      MEDICATIONS:  Antibiotics:    Neuro:    Anticoagulation:  enoxaparin Injectable 120 milliGRAM(s) SubCutaneous Once    OTHER:    IVF:      Vital Signs Last 24 Hrs  T(C): 36.2 (26 Jun 2020 10:06), Max: 36.2 (26 Jun 2020 10:06)  T(F): 97.2 (26 Jun 2020 10:06), Max: 97.2 (26 Jun 2020 10:06)  HR: 84 (26 Jun 2020 10:06) (84 - 84)  BP: 142/94 (26 Jun 2020 10:06) (142/94 - 142/94)  BP(mean): --  RR: 17 (26 Jun 2020 10:06) (17 - 17)  SpO2: 100% (26 Jun 2020 10:06) (100% - 100%)      PHYSICAL EXAM:  GENERAL: NAD, well-groomed, well-developed  HEAD:  Atraumatic, normocephalic  EYES: Conjunctiva and sclera clear;   ENMT: s, good dentition, no lesions  NECK: Supple, no JVD,   MENTAL STATUS: AAO x3; Appropriately conversant without aphasia; following commands  CRANIAL NERVES: Visual acuity normal for age, visual fields full to confrontation,   PERRL. EOMI without nystagmus.   Facial sensation intact V1-3 distribution b/l.   Face symmetric w/ normal eye closure and smile, tongue midline. Hearing grossly intact.   Speech clear.   Head turning and shoulder shrug intact.   REFLEXES:Negative Dunbar's b/l. Negative clonus b/l  MOTOR: strength 5/5 b/l upper extremities  Lowers      HF(L1/L2)     KE (L3)     DF (L4)     EHL (L5)     PF (S1)      R                     3/5              2/5           0/5           1/5            0/5  L                     4-/5               3/5          2/5            2/5           2/5  SENSATION: grossly intact to light touch all extremities  COORDINATION: no upper extremity dysmetria  MUSCLE STRETCH REFLEXES: DTRs hyporeflexive b/l LEs  EXTREMITIES:  notable b/l venous stasis  SKIN: Warm, dry;   SLR negative b/l  no tenderness to palpation throughout thoracolumbar spine      LABS:                        16.9   10.32 )-----------( 170      ( 26 Jun 2020 10:30 )             48.3     06-26    136  |  99  |  35<H>  ----------------------------<  105<H>  4.9   |  24  |  0.9    Ca    9.1      26 Jun 2020 10:30    TPro  7.3  /  Alb  4.1  /  TBili  0.7  /  DBili  x   /  AST  30  /  ALT  37  /  AlkPhos  77  06-26          CULTURES:      RADIOLOGY & ADDITIONAL STUDIES:

## 2020-06-26 NOTE — ED PROVIDER NOTE - PROGRESS NOTE DETAILS
ATTENDING NOTE: I personally evaluated the patient. I reviewed the Physician Assistant’s note (as assigned above), and agree with the findings and plan except as documented in my note.   51 y/o M with PMH of kidney stones, HTN, lumbar disc herniation s/p discectomy in 2019 done in [place], and venous stasis presents c/o worsening b/l lower back pain, R>L, x 1 mo and inability to ambulate secondary to pain. Pt has been in [place] during this time had XR done there which was nl. Pt returned last night and came to ED today. In ED, pt reports b/l LE weakness, burning sensation down the back of his R leg and tingling sensation in R leg. No change in urination or bowel habits, fevers or recent illness.  CONSTITUTIONAL: Well-developed; well-nourished; in no acute distress. NEURO: Alert, oriented, grossly unremarkable. PSYCH: Cooperative, appropriate.. SKIN: warm, dry. HEAD: Normocephalic; atraumatic. EYES: PERRL, EOMI, no conjunctival erythema. ENT: No nasal discharge; airway clear. NECK: Supple; non tender. CARD: S1, S2 normal; Regular rate and rhythm. RESP: No wheezes, rales or rhonchi. ABD: soft non tender, non distended, no rebound or guarding. Normal rectal exam. BACK: (+) point TTP over lumbar sacral area b/l. EXT: Normal ROM. 2+ pulses.  LYMPH: No acute cervical lymphadenopathy. NEURO: 2/5 strength in b/l LE, decreased sensation on lateral aspect of RLE,.  Plan for labs, urine, and neurosurgery eval. Discussed case w/ ns.  will see pt in the ED.

## 2020-06-26 NOTE — ED PROVIDER NOTE - PHYSICAL EXAMINATION
CONST: Well appearing in NAD  EYES: Sclera and conjunctiva clear.  CARD: Normal S1 S2; Normal rate and rhythm  RESP: Equal BS B/L, No wheezes, rhonchi or rales. No distress  GI: Soft, non-tender, non-distended  LAURITA: + external hemorrhoids, + good tone.   MS: Normal ROM in all extremities, + midline lumbar tenderness, No edema of lower extremities, no calf pain, radial pulses 2+ bilaterally  SKIN: Warm, dry, no acute rashes. Good turgor  NEURO: A&Ox3, No focal deficits, + paresthesias bilateral lateral legs, no paresthesias saddle region, + weakness bilateral lower extremities.

## 2020-06-26 NOTE — H&P ADULT - NSICDXPASTSURGICALHX_GEN_ALL_CORE_FT
PAST SURGICAL HISTORY:  Herniated lumbar intervertebral disc discectomy    Kidney stone ureteroscopy    Thrombus thrombectomy of left leg

## 2020-06-26 NOTE — H&P ADULT - ATTENDING COMMENTS
HPI as above.  Interval history: Pt seen and examined at bedside. No cp or sob.  Pt states that he was in [place] for a month and started to have progressive right leg weakness. He does have numbness in lower extremity. No incontinence. Pt also endorsed have SOB at night. No swelling in lower ext.   Vital Signs (24 Hrs):  T(C): 36.6 (06-26-20 @ 15:47), Max: 36.6 (06-26-20 @ 15:47)  HR: 67 (06-26-20 @ 15:47) (67 - 84)  BP: 113/60 (06-26-20 @ 15:47) (113/60 - 142/94)  RR: 18 (06-26-20 @ 15:47) (17 - 18)  SpO2: 97% (06-26-20 @ 15:47) (97% - 100%)  Wt(kg): --  Daily     Daily     I&O's Summary    PHYSICAL EXAM:  GENERAL: NAD, well-developed  HEAD:  Atraumatic, Normocephalic  EYES: EOMI, PERRLA, conjunctiva and sclera clear  NECK: Supple, No JVD  CHEST/LUNG: Clear to auscultation bilaterally; No wheeze  HEART: Regular rate and rhythm; No murmurs, rubs, or gallops  ABDOMEN: Soft, Nontender, Nondistended; Bowel sounds present  EXTREMITIES:  2+ Peripheral Pulses, No clubbing, cyanosis, or edema  PSYCH: AAOx3  NEUROLOGY: non-focal, right lower ext weakness  SKIN: No rashes or lesions    Labs reviewed  Imaging reviewed cxr clear  EKG reviewed: NSR    Plan  #Acute on chronic back pain with suspected cauda equine syndrome  -neuro surgery consul appreciated: MRI and decadron, pt given 10 mg load decadron, start decardon 4 mg q 6 hr, MRI statement   -pain meds, GI regimen     # DVT as per ER report and Sob-started on lovenox BID, CT chest r/o PE given SOB, echo  #rest as above    #Progress Note Handoff  Pending (specify):  Neurosx, follow up MRI and CT chest   Family discussion: dw pt and agreed to plan   Disposition: Home__x_/SNF___/Other________/Unknown at this time________ HPI as above.  Interval history: Pt seen and examined at bedside. No cp or sob.  Pt states that he was in [place] for a month and started to have progressive right leg weakness. He does have numbness in lower extremity. No incontinence. Pt also endorsed have SOB at night. No swelling in lower ext.   Vital Signs (24 Hrs):  T(C): 36.6 (06-26-20 @ 15:47), Max: 36.6 (06-26-20 @ 15:47)  HR: 67 (06-26-20 @ 15:47) (67 - 84)  BP: 113/60 (06-26-20 @ 15:47) (113/60 - 142/94)  RR: 18 (06-26-20 @ 15:47) (17 - 18)  SpO2: 97% (06-26-20 @ 15:47) (97% - 100%)  Wt(kg): --  Daily     Daily     I&O's Summary    PHYSICAL EXAM:  GENERAL: NAD, well-developed  HEAD:  Atraumatic, Normocephalic  EYES: EOMI, PERRLA, conjunctiva and sclera clear  NECK: Supple, No JVD  CHEST/LUNG: Clear to auscultation bilaterally; No wheeze  HEART: Regular rate and rhythm; No murmurs, rubs, or gallops  ABDOMEN: Soft, Nontender, Nondistended; Bowel sounds present  EXTREMITIES:  2+ Peripheral Pulses, No clubbing, cyanosis, or edema  PSYCH: AAOx3  NEUROLOGY: non-focal, right lower ext weakness  SKIN: No rashes or lesions    Labs reviewed  Imaging reviewed cxr clear  EKG reviewed: NSR    Plan  #Acute on chronic back pain with suspected cauda equine syndrome  -neuro surgery consul appreciated: MRI and decadron, pt given 10 mg load decadron, start decardon 4 mg q 6 hr, Pt interviewed regarding MRI. Pt denies any metal, devices, or rods in the body. Xray reviewed. Pt agreed to MRI.   -pain meds, GI regimen    # DVT as per ER report and Sob-started on lovenox BID, CT chest r/o PE given SOB, echo, follow up duplex official, Pt will need anticoagulant on dc. Discussed benefits of starting anticoagulation for DVT and risks involved in starting anticoagulation including risk of bleeding, hemorrhagic stroke, GI bleed and even death. Pt agreed to start anticoagulation given benefits outweighs risk.      #rest as above    #Progress Note Handoff  Pending (specify):  Neurosx, follow up MRI and CT chest   Family discussion: raymundo pt and agreed to plan   Disposition: Home__x_/SNF___/Other________/Unknown at this time________

## 2020-06-26 NOTE — ED PROVIDER NOTE - OBJECTIVE STATEMENT
50 y.o male w/ hx of kidney stones, HTN, lumbar disc herniation s/p disectomy, BRISA, venous stasis presents to the ED for evaluation of back pain x 6 months, worse over past month. Gradual onset, moderate severity, constant, worse w/ movement, no alleviating factors, radiates down bilateral lower extremities.  Also admits to constipation.  No hx IVDA. Denies diarrhea, urinary sxs, fever, chills, chest pain, dyspnea, no saddle anesthesia, bowel/bladder incontinence/ retention. + weakness of lower extremities. no recent trauma or falls.

## 2020-06-26 NOTE — H&P ADULT - NSHPSOCIALHISTORY_GEN_ALL_CORE
Patient is , lives with 2 children; works as a   Ex-smoker, quit 23 years ago; smoked .5 pack/day x 10 years  Consumes EtOH socially; 1 beer/day  Denies IVDA

## 2020-06-26 NOTE — H&P ADULT - HISTORY OF PRESENT ILLNESS
51 y/o male with PMH of kidney stones, HTN, lumbar disc herniation s/p discectomy in 2019, and venous stasis presents with c/o back pain for 6 months which has progressive worsened over the past month radiating to lower limbs (R>L), inability to ambulate due to leg weakness and constipation. Patient has been in [place] for past 4 months and had an X-ray done there which was nornal and was prescribed medications. Patient returned last night from [place] and came to the ED.   In ED, patient reports b/l LE weakness, burning sensation down the back of his R leg and tingling sensation in R leg. No change in urination or bowel habits, fevers or recent illness.  constipation.  No hx IVDA. Denies diarrhea, urinary sxs, fever, chills, chest pain, dyspnea, no saddle anesthesia, bowel/bladder incontinence/retention. 49 y/o male with PMH of kidney stones, HTN, lumbar disc herniation s/p discectomy in 2019, and venous stasis presents with c/o back pain for 6 months which has progressive worsened over the past month radiating to lower limbs (R>L), inability to ambulate due to leg weakness and constipation. Patient has been in [place] for past 4 months and had an X-ray done there which was nornal and was prescribed pain medications. Patient returned last night from [place] and came to the ED.  In ED, vital signs were 97.2 f, /94 mm hg, HR 84 bpm, RR 17 and 100% o2 on RA. Patient reports b/l lower extremity weakness associated with burning sensation radiating to b/l extremities (R>L). In the ED, preliminary doppler was done and positive for left femoral DVT likely from being bedridden for a month at home due to pain. No hx IVDA. Denies diarrhea, urinary sxs, fever, chills, chest pain, dyspnea, no saddle anesthesia, bowel/bladder incontinence/retention.

## 2020-06-26 NOTE — CONSULT NOTE ADULT - ASSESSMENT
49yo male with PMHx of kidney stones, HTN, hx of laminectomy 2/2 lumbar disc herniation, BRISA, venous stasis who presents to ED with  1mo hx of progressive back pain and LE weakness.     -urgent MRI Thoracic and Lumbar spine  - admission for DVT and LE weakness workup  - continue decadron 10mg q6h at this time  - will follow    case discussed with Dr. Garcia

## 2020-06-27 LAB
ALBUMIN SERPL ELPH-MCNC: 3.9 G/DL — SIGNIFICANT CHANGE UP (ref 3.5–5.2)
ALP SERPL-CCNC: 74 U/L — SIGNIFICANT CHANGE UP (ref 30–115)
ALT FLD-CCNC: 35 U/L — SIGNIFICANT CHANGE UP (ref 0–41)
ANION GAP SERPL CALC-SCNC: 12 MMOL/L — SIGNIFICANT CHANGE UP (ref 7–14)
ANION GAP SERPL CALC-SCNC: 14 MMOL/L — SIGNIFICANT CHANGE UP (ref 7–14)
APTT BLD: 33.7 SEC — SIGNIFICANT CHANGE UP (ref 27–39.2)
AST SERPL-CCNC: 23 U/L — SIGNIFICANT CHANGE UP (ref 0–41)
BASOPHILS # BLD AUTO: 0.01 K/UL — SIGNIFICANT CHANGE UP (ref 0–0.2)
BASOPHILS NFR BLD AUTO: 0.1 % — SIGNIFICANT CHANGE UP (ref 0–1)
BILIRUB SERPL-MCNC: 0.6 MG/DL — SIGNIFICANT CHANGE UP (ref 0.2–1.2)
BUN SERPL-MCNC: 22 MG/DL — HIGH (ref 10–20)
BUN SERPL-MCNC: 23 MG/DL — HIGH (ref 10–20)
CALCIUM SERPL-MCNC: 9.3 MG/DL — SIGNIFICANT CHANGE UP (ref 8.5–10.1)
CALCIUM SERPL-MCNC: 9.9 MG/DL — SIGNIFICANT CHANGE UP (ref 8.5–10.1)
CHLORIDE SERPL-SCNC: 101 MMOL/L — SIGNIFICANT CHANGE UP (ref 98–110)
CHLORIDE SERPL-SCNC: 98 MMOL/L — SIGNIFICANT CHANGE UP (ref 98–110)
CO2 SERPL-SCNC: 21 MMOL/L — SIGNIFICANT CHANGE UP (ref 17–32)
CO2 SERPL-SCNC: 25 MMOL/L — SIGNIFICANT CHANGE UP (ref 17–32)
CREAT SERPL-MCNC: 0.7 MG/DL — SIGNIFICANT CHANGE UP (ref 0.7–1.5)
CREAT SERPL-MCNC: 0.7 MG/DL — SIGNIFICANT CHANGE UP (ref 0.7–1.5)
CULTURE RESULTS: SIGNIFICANT CHANGE UP
EOSINOPHIL # BLD AUTO: 0 K/UL — SIGNIFICANT CHANGE UP (ref 0–0.7)
EOSINOPHIL NFR BLD AUTO: 0 % — SIGNIFICANT CHANGE UP (ref 0–8)
GLUCOSE SERPL-MCNC: 150 MG/DL — HIGH (ref 70–99)
GLUCOSE SERPL-MCNC: 192 MG/DL — HIGH (ref 70–99)
HCT VFR BLD CALC: 47 % — SIGNIFICANT CHANGE UP (ref 42–52)
HGB BLD-MCNC: 16.4 G/DL — SIGNIFICANT CHANGE UP (ref 14–18)
IMM GRANULOCYTES NFR BLD AUTO: 0.6 % — HIGH (ref 0.1–0.3)
INR BLD: 1.08 RATIO — SIGNIFICANT CHANGE UP (ref 0.65–1.3)
LYMPHOCYTES # BLD AUTO: 1.23 K/UL — SIGNIFICANT CHANGE UP (ref 1.2–3.4)
LYMPHOCYTES # BLD AUTO: 17 % — LOW (ref 20.5–51.1)
MAGNESIUM SERPL-MCNC: 2 MG/DL — SIGNIFICANT CHANGE UP (ref 1.8–2.4)
MCHC RBC-ENTMCNC: 30 PG — SIGNIFICANT CHANGE UP (ref 27–31)
MCHC RBC-ENTMCNC: 34.9 G/DL — SIGNIFICANT CHANGE UP (ref 32–37)
MCV RBC AUTO: 85.9 FL — SIGNIFICANT CHANGE UP (ref 80–94)
MONOCYTES # BLD AUTO: 0.2 K/UL — SIGNIFICANT CHANGE UP (ref 0.1–0.6)
MONOCYTES NFR BLD AUTO: 2.8 % — SIGNIFICANT CHANGE UP (ref 1.7–9.3)
NEUTROPHILS # BLD AUTO: 5.76 K/UL — SIGNIFICANT CHANGE UP (ref 1.4–6.5)
NEUTROPHILS NFR BLD AUTO: 79.5 % — HIGH (ref 42.2–75.2)
NRBC # BLD: 0 /100 WBCS — SIGNIFICANT CHANGE UP (ref 0–0)
PLATELET # BLD AUTO: 181 K/UL — SIGNIFICANT CHANGE UP (ref 130–400)
POTASSIUM SERPL-MCNC: 4.6 MMOL/L — SIGNIFICANT CHANGE UP (ref 3.5–5)
POTASSIUM SERPL-MCNC: 5.8 MMOL/L — HIGH (ref 3.5–5)
POTASSIUM SERPL-SCNC: 4.6 MMOL/L — SIGNIFICANT CHANGE UP (ref 3.5–5)
POTASSIUM SERPL-SCNC: 5.8 MMOL/L — HIGH (ref 3.5–5)
PROT SERPL-MCNC: 7.2 G/DL — SIGNIFICANT CHANGE UP (ref 6–8)
PROTHROM AB SERPL-ACNC: 12.4 SEC — SIGNIFICANT CHANGE UP (ref 9.95–12.87)
RBC # BLD: 5.47 M/UL — SIGNIFICANT CHANGE UP (ref 4.7–6.1)
RBC # FLD: 13.9 % — SIGNIFICANT CHANGE UP (ref 11.5–14.5)
SARS-COV-2 RNA SPEC QL NAA+PROBE: SIGNIFICANT CHANGE UP
SODIUM SERPL-SCNC: 133 MMOL/L — LOW (ref 135–146)
SODIUM SERPL-SCNC: 138 MMOL/L — SIGNIFICANT CHANGE UP (ref 135–146)
SPECIMEN SOURCE: SIGNIFICANT CHANGE UP
WBC # BLD: 7.24 K/UL — SIGNIFICANT CHANGE UP (ref 4.8–10.8)
WBC # FLD AUTO: 7.24 K/UL — SIGNIFICANT CHANGE UP (ref 4.8–10.8)

## 2020-06-27 PROCEDURE — 99223 1ST HOSP IP/OBS HIGH 75: CPT | Mod: 57

## 2020-06-27 PROCEDURE — 99222 1ST HOSP IP/OBS MODERATE 55: CPT

## 2020-06-27 PROCEDURE — 99233 SBSQ HOSP IP/OBS HIGH 50: CPT

## 2020-06-27 RX ORDER — DEXAMETHASONE 0.5 MG/5ML
10 ELIXIR ORAL EVERY 6 HOURS
Refills: 0 | Status: DISCONTINUED | OUTPATIENT
Start: 2020-06-27 | End: 2020-06-28

## 2020-06-27 RX ORDER — ENOXAPARIN SODIUM 100 MG/ML
120 INJECTION SUBCUTANEOUS ONCE
Refills: 0 | Status: COMPLETED | OUTPATIENT
Start: 2020-06-27 | End: 2020-06-27

## 2020-06-27 RX ORDER — DEXTROSE 50 % IN WATER 50 %
50 SYRINGE (ML) INTRAVENOUS ONCE
Refills: 0 | Status: COMPLETED | OUTPATIENT
Start: 2020-06-27 | End: 2020-06-27

## 2020-06-27 RX ORDER — INSULIN HUMAN 100 [IU]/ML
10 INJECTION, SOLUTION SUBCUTANEOUS ONCE
Refills: 0 | Status: COMPLETED | OUTPATIENT
Start: 2020-06-27 | End: 2020-06-27

## 2020-06-27 RX ADMIN — Medication 50 MILLILITER(S): at 10:45

## 2020-06-27 RX ADMIN — GABAPENTIN 100 MILLIGRAM(S): 400 CAPSULE ORAL at 05:13

## 2020-06-27 RX ADMIN — INSULIN HUMAN 10 UNIT(S): 100 INJECTION, SOLUTION SUBCUTANEOUS at 10:45

## 2020-06-27 RX ADMIN — GABAPENTIN 100 MILLIGRAM(S): 400 CAPSULE ORAL at 21:15

## 2020-06-27 RX ADMIN — GABAPENTIN 100 MILLIGRAM(S): 400 CAPSULE ORAL at 13:53

## 2020-06-27 RX ADMIN — METHOCARBAMOL 750 MILLIGRAM(S): 500 TABLET, FILM COATED ORAL at 21:15

## 2020-06-27 RX ADMIN — ENOXAPARIN SODIUM 120 MILLIGRAM(S): 100 INJECTION SUBCUTANEOUS at 13:54

## 2020-06-27 RX ADMIN — POLYETHYLENE GLYCOL 3350 17 GRAM(S): 17 POWDER, FOR SOLUTION ORAL at 11:22

## 2020-06-27 RX ADMIN — METHOCARBAMOL 750 MILLIGRAM(S): 500 TABLET, FILM COATED ORAL at 05:14

## 2020-06-27 RX ADMIN — Medication 4 MILLIGRAM(S): at 04:33

## 2020-06-27 RX ADMIN — METHOCARBAMOL 750 MILLIGRAM(S): 500 TABLET, FILM COATED ORAL at 13:53

## 2020-06-27 RX ADMIN — LISINOPRIL 10 MILLIGRAM(S): 2.5 TABLET ORAL at 05:14

## 2020-06-27 RX ADMIN — Medication 4 MILLIGRAM(S): at 10:46

## 2020-06-27 RX ADMIN — Medication 102 MILLIGRAM(S): at 17:23

## 2020-06-27 RX ADMIN — PANTOPRAZOLE SODIUM 40 MILLIGRAM(S): 20 TABLET, DELAYED RELEASE ORAL at 05:14

## 2020-06-27 NOTE — PROGRESS NOTE ADULT - SUBJECTIVE AND OBJECTIVE BOX
RUT RAMIRO  50y, Male  Allergy: No Known Allergies  Hospital Day: 1d      Patient was seen and examined at bedside. denies fevers, chills, cp, palpitations, sob, wheezing, abdominal pain, nausea/vomiting, or loss of bowel or bladder function. pt denies worsening of LE motor functions and continues to endorse numbness/tingling of bilateral LE.       VITALS:  T(F): 96.2 (20 @ 08:57), Max: 98 (20 @ 23:55)  HR: 63 (20 @ 08:00)  BP: 108/67 (20 @ 08:57) (107/63 - 129/72)  RR: 19 (20 @ 08:57)  SpO2: 96% (20 @ 23:55)    PHYSICAL EXAM:  GENERAL: well developed well nourished in no acute distress  NECK: No evidence of swelling no palpable lymphadenopathy  CHEST/LUNG: clear to ausculation bilaterally no wheezes, rales, or rhonchi   HEART/VASC: RRR, No murmurs, rubs, or gallops appreciated, 2+ equal bilateral radial pulse  ABDOMEN: Soft, non tender non distended +bowel sounds in all quadrants, no palpable organomegaly   EXTREMITIES: chronic venous stasis changes noted.   limited ROM 2/2 weakness strength bilateral LE 4/5. RLE calf swelling and tender to palpation     TESTS & MEASUREMENTS:  Weight (Kg): 110 (20 @ 08:57)  BMI (kg/m2): 41.6 ()                          16.4   7.24  )-----------( 181      ( 2020 07:38 )             47.0     PT/INR - ( 2020 07:38 )   PT: 12.40 sec;   INR: 1.08 ratio         PTT - ( 2020 07:38 )  PTT:33.7 sec      138  |  101  |  23<H>  ----------------------------<  150<H>  5.8<H>   |  25  |  0.7    Ca    9.9      2020 07:38  Mg     2.0         TPro  7.2  /  Alb  3.9  /  TBili  0.6  /  DBili  x   /  AST  23  /  ALT  35  /  AlkPhos  74  06-27    LIVER FUNCTIONS - ( 2020 07:38 )  Alb: 3.9 g/dL / Pro: 7.2 g/dL / ALK PHOS: 74 U/L / ALT: 35 U/L / AST: 23 U/L / GGT: x                 Urinalysis Basic - ( 2020 16:10 )    Color: Yellow / Appearance: Clear / S.025 / pH: x  Gluc: x / Ketone: Negative  / Bili: Negative / Urobili: <2 mg/dL   Blood: x / Protein: Trace / Nitrite: Negative   Leuk Esterase: Negative / RBC: x / WBC x   Sq Epi: x / Non Sq Epi: x / Bacteria: x          RADIOLOGY & ADDITIONAL TESTS:   reviewed in sunrise     MEDICATIONS:  MEDICATIONS  (STANDING):  dexAMETHasone  Injectable 4 milliGRAM(s) IV Push every 6 hours  dextrose 50% Injectable 50 milliLiter(s) IV Push once  enoxaparin Injectable 120 milliGRAM(s) SubCutaneous every 12 hours  gabapentin 100 milliGRAM(s) Oral three times a day  insulin regular  human recombinant. 10 Unit(s) IV Push once  lidocaine   Patch 1 Patch Transdermal daily  lisinopril 10 milliGRAM(s) Oral daily  methocarbamol 750 milliGRAM(s) Oral three times a day  pantoprazole    Tablet 40 milliGRAM(s) Oral before breakfast  polyethylene glycol 3350 17 Gram(s) Oral daily    MEDICATIONS  (PRN):  acetaminophen   Tablet .. 650 milliGRAM(s) Oral every 6 hours PRN Moderate Pain (4 - 6)  senna 2 Tablet(s) Oral at bedtime PRN Constipation

## 2020-06-27 NOTE — CONSULT NOTE ADULT - ASSESSMENT
51 y/o male with PMH of kidney stones, HTN, lumbar disc herniation s/p discectomy in 2019, and venous stasis presents with c/o back pain for 6 months which has progressive worsened over the past month radiating to lower limbs (R>L), inability to ambulate due to leg weakness. Patient reports b/l lower extremity weakness associated with burning sensation radiating to b/l extremities (R>L). In the ED. US duplex was done showing a right peroneal and left posterior tibial thrombus likely from being bedridden for a month at home due to pain. He had been started on therapeutic lovenox. Vascular surgery consulted for placement of IVC Filter in anticipation of laminectomy with neurosurgery.    PLAN  NPO at midnight  Pre-op labs: cbc, bmp, mag, phos, T&S, coags  Added on to OR for placement of IVC filter 6/28AM  Consent in chart

## 2020-06-27 NOTE — PROGRESS NOTE ADULT - ASSESSMENT
49 y/o male with PMH of kidney stones, HTN, lumbar disc herniation s/p discectomy in 2019, and venous stasis presents with c/o back pain for 6 months which has progressive worsened over the past month radiating to lower limbs (R>L), inability to ambulate due to leg weakness and constipation liked due to degenerative spine disease vs less likely cord compression associated with new onset DVT    # Acute on chronic back pain  - Dexamethasone 10 mg IV q6 per neurosurgery  - Follow up thoracic/lumbar spine MRI to r/o spinal cord compression  - Follow neurosurgery recs  - Tylenol 650 mg prn  - Lidocaine patch on site of back pain  - Robaxin 750 mg tid  - Gabapentin 100 mg three x day    # Left lower extremity acute deep vein thrombosis   - Follow up with duplex ultrasound (prelim is positive for left femoral dvt but follow up for official read)   - Enoxaparin 120 mg subcutaneous BID  - Chest CT angiography to r/o PE  - Order coagulation studies in the AM    # Constipation likely from opioid medication at home for back pain  - Senna 2 tablets  - Miralax 17 grams once/day    # HTN  - stable blood pressure 142/94 mm hg  - continue home lisinopril 10 mg    Diet: DASH   Activity: As tolerated; pending PT evaluation  DVT Prophylaxis: on therapeutic Lovanox   GI Prophylaxis: continue pantoprazole 40 mg q day  Code Status: Full code   Disposition: Home pending clinical improvement   Contact: Roquevalerie (wife) [phone] 49 y/o male with PMH of kidney stones, HTN, lumbar disc herniation s/p discectomy in 2019, and venous stasis presents with c/o back pain for 6 months which has progressive worsened over the past month radiating to lower limbs (R>L), inability to ambulate due to leg weakness and constipation liked due to degenerative spine disease vs less likely cord compression associated with new onset DVT    # Acute on chronic back pain/ L1-L3 disk herniation/Spinal Cord Compression  - Dexamethasone 10 mg IV q6 per neurosurgery  - MRI confirmed the Spinal Cord compression  - Tylenol 650 mg prn  - Lidocaine patch on site of back pain  - Robaxin 750 mg tid  - Patient is scheduled for the OR for the decompression, he is NPO  Medically he is moderate risk for the planned intermediate risk procedure    -Neurosurgery today asked for the Vascular Surgery clearance as he has new diagnosed distal DVT,  Vascular surgery was called for that STAT, they will follow up possibly will require IVC filter before Neurosurgery    # Left lower extremity acute distal deep vein thrombosis   - Peroneal and tibial veins  - Enoxaparin 120 mg subcutaneous BID, holding for the planned OR today,  last dose was yesterday  -No SOB, Breathing on Room air    # Constipation likely from opioid medication at home for back pain  - Senna 2 tablets  - Miralax 17 grams once/day    # HTN  - Holding Lisinopril due to Low BP    Diet: NPO for the planned OR    Activity: Strict Bedrest, Spinal Cord compression  DVT Prophylaxis: on therapeutic Lovenox, holding for OR  GI Prophylaxis: continue pantoprazole 40 mg q day  Code Status: Full code   Contact: Suzan (wife) [phone]    Pending Neurosurgery OR, Possible Vascular Surgery OR and  Physical Therapy after that

## 2020-06-27 NOTE — CHART NOTE - NSCHARTNOTEFT_GEN_A_CORE
Thoracic and Lumbar Spine MRI findings d/w Dr. Garcia and plan as follows.     -Pt added on to OR schedule for 06/27 for L2-3 Laminectomy.   -Please keep pt NPO  -Pt will need medical clearance   -COVID swab   -Please hold am dose of Lovenox if possible   -s/w patient and Dr. Esparza Thoracic and Lumbar Spine MRI findings d/w Dr. Garcia and plan as follows. Pt with large L2-3 disc herniation causing almost complete obliteration of the canal    -Pt added on to OR schedule for 06/27 for L2-3 Laminectomy.   -Please keep pt NPO  -Pt will need medical clearance   -COVID swab   -Please hold am dose of Lovenox if possible   -s/w patient and Dr. Esparza

## 2020-06-27 NOTE — CONSULT NOTE ADULT - SUBJECTIVE AND OBJECTIVE BOX
RAMIRO BETTENCOURT [id number]  50y Male    HPI:  49 y/o male with PMH of kidney stones, HTN, lumbar disc herniation s/p discectomy in 2019, and venous stasis presents with c/o back pain for 6 months which has progressive worsened over the past month radiating to lower limbs (R>L), inability to ambulate due to leg weakness and constipation. Patient has been in [place] for past 4 months and had an X-ray done there which was nornal and was prescribed pain medications. Patient returned last night from [place] and came to the ED.  In ED, vital signs were 97.2 f, /94 mm hg, HR 84 bpm, RR 17 and 100% o2 on RA. Patient reports b/l lower extremity weakness associated with burning sensation radiating to b/l extremities (R>L). In the ED. US duplex was done showing a right peroneal and left posterior tibial thrombys. likely from being bedridden for a month at home due to pain. No hx IVDA. Denies diarrhea, urinary sxs, fever, chills, chest pain, dyspnea, no saddle anesthesia, bowel/bladder incontinence/retention. He had been started on therapeutic lovenox. Vascular surgery consulted for placement of IVC Filter.      PAST MEDICAL & SURGICAL HISTORY:  Lumbar disc herniation  Kidney stones  HTN (hypertension)  Herniated lumbar intervertebral disc: discectomy  Kidney stone: ureteroscopy  Thrombus: thrombectomy of left leg        MEDICATIONS  (STANDING):  dexAMETHasone  IVPB 10 milliGRAM(s) IV Intermittent every 6 hours  enoxaparin Injectable 120 milliGRAM(s) SubCutaneous once  gabapentin 100 milliGRAM(s) Oral three times a day  lidocaine   Patch 1 Patch Transdermal daily  lisinopril 10 milliGRAM(s) Oral daily  methocarbamol 750 milliGRAM(s) Oral three times a day  pantoprazole    Tablet 40 milliGRAM(s) Oral before breakfast  polyethylene glycol 3350 17 Gram(s) Oral daily    MEDICATIONS  (PRN):  acetaminophen   Tablet .. 650 milliGRAM(s) Oral every 6 hours PRN Moderate Pain (4 - 6)  senna 2 Tablet(s) Oral at bedtime PRN Constipation      Allergies  REVIEW OF SYSTEMS  [x] A ten-point review of systems was otherwise negative except as noted.  [ ] Due to altered mental status/intubation, subjective information were not able to be obtained from the patient. History was obtained, to the extent possible, from review of the chart and collateral sources of information.      Vital Signs Last 24 Hrs  T(C): 35.7 (2020 08:57), Max: 36.7 (2020 23:55)  T(F): 96.2 (2020 08:57), Max: 98 (2020 23:55)  HR: 63 (2020 08:00) (59 - 84)  BP: 108/67 (2020 08:57) (107/63 - 129/72)  BP(mean): --  RR: 19 (2020 08:57) (18 - 19)  SpO2: 96% (2020 23:55) (95% - 97%)    PHYSICAL EXAM:  GENERAL: NAD, well-appearing  CHEST/LUNG: Clear to auscultation bilaterally  HEART: Regular rate and rhythm  ABDOMEN: Soft, Nontender, Nondistended;   EXTREMITIES:  B/l lower extremity venous stasis pigmentation, no ulcerations. No noticible swelling, compartments soft, 1+ DP/PT pulse    LABS:  Labs:  CAPILLARY BLOOD GLUCOSE                              16.4   7.24  )-----------( 181      ( 2020 07:38 )             47.0       Auto Neutrophil %: 79.5 % (20 @ 07:38)  Auto Immature Granulocyte %: 0.6 % (20 @ 07:38)        133<L>  |  98  |  22<H>  ----------------------------<  192<H>  4.6   |  21  |  0.7      Calcium, Total Serum: 9.3 mg/dL (20 @ 11:40)      LFTs:             7.2  | 0.6  | 23       ------------------[74      ( 2020 07:38 )  3.9  | x    | 35          Lipase:x      Amylase:x             Coags:     12.40  ----< 1.08    ( 2020 07:38 )     33.7      Urinalysis Basic - ( 2020 16:10 )    Color: Yellow / Appearance: Clear / S.025 / pH: x  Gluc: x / Ketone: Negative  / Bili: Negative / Urobili: <2 mg/dL   Blood: x / Protein: Trace / Nitrite: Negative   Leuk Esterase: Negative / RBC: x / WBC x   Sq Epi: x / Non Sq Epi: x / Bacteria: x            RADIOLOGY & ADDITIONAL STUDIES:  < from: [facility] Duplex Lower Ext Vein Scan, Bilat (20 @ 12:31) >    Impression:    Acute deep vein thrombosisof the right peroneal and left posterior tibial vein.    < end of copied text >

## 2020-06-27 NOTE — PROGRESS NOTE ADULT - SUBJECTIVE AND OBJECTIVE BOX
RAMIRO BETTENCOURT 50y Male  MRN#: [id number]     SUBJECTIVE  Patient is a 50y old Male who presents with a chief complaint of back pain, new onset DVT (2020 15:19)  Currently admitted to medicine with the primary diagnosis of Back pain    OBJECTIVE  PAST MEDICAL & SURGICAL HISTORY  Lumbar disc herniation  Kidney stones  HTN (hypertension)  Herniated lumbar intervertebral disc: discectomy  Kidney stone: ureteroscopy  Thrombus: thrombectomy of left leg    ALLERGIES:  No Known Allergies    MEDICATIONS:  STANDING MEDICATIONS  dexAMETHasone  Injectable 4 milliGRAM(s) IV Push every 6 hours  enoxaparin Injectable 120 milliGRAM(s) SubCutaneous every 12 hours  gabapentin 100 milliGRAM(s) Oral three times a day  lidocaine   Patch 1 Patch Transdermal daily  lisinopril 10 milliGRAM(s) Oral daily  methocarbamol 750 milliGRAM(s) Oral three times a day  pantoprazole    Tablet 40 milliGRAM(s) Oral before breakfast  polyethylene glycol 3350 17 Gram(s) Oral daily    PRN MEDICATIONS  acetaminophen   Tablet .. 650 milliGRAM(s) Oral every 6 hours PRN  senna 2 Tablet(s) Oral at bedtime PRN      VITAL SIGNS: Last 24 Hours  T(C): 35.7 (2020 08:00), Max: 36.7 (2020 23:55)  T(F): 96.2 (2020 08:00), Max: 98 (2020 23:55)  HR: 63 (2020 08:00) (59 - 84)  BP: 108/67 (2020 08:00) (107/63 - 142/94)  BP(mean): --  RR: 19 (2020 08:00) (17 - 19)  SpO2: 96% (2020 23:55) (95% - 100%)    LABS:                        16.9   10.32 )-----------( 170      ( 2020 10:30 )             48.3     06-26    136  |  99  |  35<H>  ----------------------------<  105<H>  4.9   |  24  |  0.9    Ca    9.1      2020 10:30    TPro  7.3  /  Alb  4.1  /  TBili  0.7  /  DBili  x   /  AST  30  /  ALT  37  /  AlkPhos  77  06-26      Urinalysis Basic - ( 2020 16:10 )    Color: Yellow / Appearance: Clear / S.025 / pH: x  Gluc: x / Ketone: Negative  / Bili: Negative / Urobili: <2 mg/dL   Blood: x / Protein: Trace / Nitrite: Negative   Leuk Esterase: Negative / RBC: x / WBC x   Sq Epi: x / Non Sq Epi: x / Bacteria: x    PHYSICAL EXAM:  GENERAL: NAD, lying in bed comfortably  	HEAD:  Atraumatic, Normocephalic  	EYES: EOMI, PERRLA, conjunctiva and sclera clear  	ENT: Moist mucous membranes  	NECK: Supple, No JVD  	CHEST/LUNG: Clear to auscultation bilaterally; No rales, rhonchi, wheezing, or rubs. Unlabored respirations  	HEART: Regular rate and rhythm; No murmurs, rubs, or gallops  	EXTREMITIES:  2+ Peripheral Pulses, brisk capillary refill, +edema. No clubbing, cyanosis  	NERVOUS SYSTEM:  Alert & Oriented X3, speech clear. No deficits   	MSK: B/L lower extremity weakness 4/5  SKIN: discoloration on b/l lower extremities, venous stasis +

## 2020-06-27 NOTE — PROGRESS NOTE ADULT - SUBJECTIVE AND OBJECTIVE BOX
Subjective: Pt with some improvement in BLE with steroids overnight. MRI significant for large extruded L2-3 lumbar disc with complete obliteration of the canal. Pt with DVT on full AC    T(C): 35.7 (06-27-20 @ 08:57), Max: 36.7 (06-26-20 @ 23:55)  HR: 63 (06-27-20 @ 08:00) (59 - 84)  BP: 108/67 (06-27-20 @ 08:57) (107/63 - 129/72)  RR: 19 (06-27-20 @ 08:57) (18 - 19)  SpO2: 96% (06-26-20 @ 23:55) (95% - 97%)  Wt(kg): --    Exam:  A&Ox3  PERRL  FS  TM  BUE 5/5,  RLE prox 3/5, dist 1-2/5  LLE 3/5    CBC Full  -  ( 27 Jun 2020 07:38 )  WBC Count : 7.24 K/uL  RBC Count : 5.47 M/uL  Hemoglobin : 16.4 g/dL  Hematocrit : 47.0 %  Platelet Count - Automated : 181 K/uL  Mean Cell Volume : 85.9 fL  Mean Cell Hemoglobin : 30.0 pg  Mean Cell Hemoglobin Concentration : 34.9 g/dL  Auto Neutrophil # : 5.76 K/uL  Auto Lymphocyte # : 1.23 K/uL  Auto Monocyte # : 0.20 K/uL  Auto Eosinophil # : 0.00 K/uL  Auto Basophil # : 0.01 K/uL  Auto Neutrophil % : 79.5 %  Auto Lymphocyte % : 17.0 %  Auto Monocyte % : 2.8 %  Auto Eosinophil % : 0.0 %  Auto Basophil % : 0.1 %    06-27    133<L>  |  98  |  22<H>  ----------------------------<  192<H>  4.6   |  21  |  0.7    Ca    9.3      27 Jun 2020 11:40  Mg     2.0     06-27    TPro  7.2  /  Alb  3.9  /  TBili  0.6  /  DBili  x   /  AST  23  /  ALT  35  /  AlkPhos  74  06-27    PT/INR - ( 27 Jun 2020 07:38 )   PT: 12.40 sec;   INR: 1.08 ratio         PTT - ( 27 Jun 2020 07:38 )  PTT:33.7 sec          Assessment/Plan:    Pt requires urgent discectomy  Cont decadron which is helping functional improvement  Plan for OR 6/28 in AM with vascular who will place IVC filter prior to our microdiscectomy  The risk, benefit an alternatives of L2-3 microdiscectomy were discussed with patient and his sister in law who was present. Those include but are not limited to bleeding, infection, CSF leak, further/permanent nerve injury, reherniation, need for further procedures. Pt understand it is unclear how much functional improvement he will have as it has been months of significant weakness. Pt is also at risk for PE. We will  ttempt to minimize that risk with IVC filter placement.     Please ensure pt has necessary medical clearances  Please keep NPO at MN. NO AM Lovenox

## 2020-06-27 NOTE — PROGRESS NOTE ADULT - ASSESSMENT
49 y/o male with PMH of kidney stones, HTN, lumbar disc herniation s/p discectomy in 2019, and venous stasis presents with c/o back pain for 6 months which has progressive worsened over the past month radiating to lower limbs (R>L), inability to ambulate due to leg weakness and constipation liked due to degenerative spine disease vs less likely cord compression associated with new onset DVT    #LE weakness 2/2 acute spinal cord compression 51 y/o male with PMH of kidney stones, HTN, lumbar disc herniation s/p discectomy in 2019, and venous stasis presents with c/o back pain for 6 months which has progressive worsened over the past month radiating to lower limbs (R>L), inability to ambulate due to leg weakness and constipation liked due to degenerative spine disease vs less likely cord compression associated with new onset DVT    #LE weakness 2/2 acute spinal cord compression   planning for OR today for L2-L3 laminectomy   cont dexamethasone 10mg q6h as per neuro recs  cont present pain mgmt     #pre-operative clearance  pt is moderate risk for planned intermediate risk procedure.  prior to acute event pt able to perform all ADLs  RCRI 0 no cardiac workup needed prior to procedure  -pt will need to resume anticoagulation given newly diagnosed DVT post op as long as there is no surgical contraindication   -will treat and repeat K if potassium within normal limits pt can proceed with planned procedure   -encourage incentive spirometry during post operative period  -bipap PRN post op   -maintain active T&S  -resume all home meds  -IVF hydration     #bilateral acute DVT  preliminary read-->final doppler read pending  -holding lovenox 120mf q12h as pt scheduled for OR today  -plan to resume post op if no contraindications from a surgical stand point  -will change to NOAC prior to discharge    #constipation  -cont with bowel regimen    #HTN  stable  -cont lisinopril     FULL code  PT eval post operatively     Progress Note Handoff  Pending: laminectomy, PT, AC for DVT, hyperkalemia resolution   Patient/Family discussion: POC discussed with pt  Disposition: acute 51 y/o male with PMH of kidney stones, HTN, lumbar disc herniation s/p discectomy in 2019, and venous stasis presents with c/o back pain for 6 months which has progressive worsened over the past month radiating to lower limbs (R>L), inability to ambulate due to leg weakness and constipation liked due to degenerative spine disease vs less likely cord compression associated with new onset DVT    #LE weakness 2/2 acute spinal cord compression   planning for OR today for L2-L3 laminectomy   cont dexamethasone 10mg q6h as per neuro recs  cont present pain mgmt     #pre-operative clearance  pt is moderate risk for planned intermediate risk procedure.  prior to event pt able to perform all ADLs  RCRI 0 no cardiac workup needed prior to procedure  -pt will need to resume anticoagulation post op given newly diagnosed DVT given elevated risk of PE as long as there are no surgical contraindication   -will treat and repeat K if potassium within normal limits pt can proceed with planned procedure   -encourage incentive spirometry during post operative period  -bipap PRN post op   -maintain active T&S  -resume all home meds  -IVF hydration     #bilateral acute DVT  preliminary read-->final doppler read pending  -holding lovenox 120mg q12h as pt scheduled for OR today  -plan to resume post op if no contraindications from a surgical stand point  -will change to NOAC prior to discharge    #constipation  -cont with bowel regimen    #HTN  stable  -cont lisinopril     FULL code  PT eval post operatively     Progress Note Handoff  Pending: laminectomy, PT, AC for DVT, hyperkalemia resolution   Patient/Family discussion: POC discussed with pt  Disposition: acute

## 2020-06-28 ENCOUNTER — RESULT REVIEW (OUTPATIENT)
Age: 50
End: 2020-06-28

## 2020-06-28 DIAGNOSIS — I82.409 ACUTE EMBOLISM AND THROMBOSIS OF UNSPECIFIED DEEP VEINS OF UNSPECIFIED LOWER EXTREMITY: ICD-10-CM

## 2020-06-28 LAB
ALBUMIN SERPL ELPH-MCNC: 3.8 G/DL — SIGNIFICANT CHANGE UP (ref 3.5–5.2)
ALP SERPL-CCNC: 67 U/L — SIGNIFICANT CHANGE UP (ref 30–115)
ALT FLD-CCNC: 37 U/L — SIGNIFICANT CHANGE UP (ref 0–41)
ANION GAP SERPL CALC-SCNC: 14 MMOL/L — SIGNIFICANT CHANGE UP (ref 7–14)
AST SERPL-CCNC: 18 U/L — SIGNIFICANT CHANGE UP (ref 0–41)
BASOPHILS # BLD AUTO: 0.01 K/UL — SIGNIFICANT CHANGE UP (ref 0–0.2)
BASOPHILS NFR BLD AUTO: 0.1 % — SIGNIFICANT CHANGE UP (ref 0–1)
BILIRUB SERPL-MCNC: 0.5 MG/DL — SIGNIFICANT CHANGE UP (ref 0.2–1.2)
BUN SERPL-MCNC: 27 MG/DL — HIGH (ref 10–20)
CALCIUM SERPL-MCNC: 9.3 MG/DL — SIGNIFICANT CHANGE UP (ref 8.5–10.1)
CHLORIDE SERPL-SCNC: 97 MMOL/L — LOW (ref 98–110)
CO2 SERPL-SCNC: 24 MMOL/L — SIGNIFICANT CHANGE UP (ref 17–32)
CREAT SERPL-MCNC: 0.8 MG/DL — SIGNIFICANT CHANGE UP (ref 0.7–1.5)
EOSINOPHIL # BLD AUTO: 0 K/UL — SIGNIFICANT CHANGE UP (ref 0–0.7)
EOSINOPHIL NFR BLD AUTO: 0 % — SIGNIFICANT CHANGE UP (ref 0–8)
GLUCOSE SERPL-MCNC: 179 MG/DL — HIGH (ref 70–99)
HCT VFR BLD CALC: 45.3 % — SIGNIFICANT CHANGE UP (ref 42–52)
HGB BLD-MCNC: 15.2 G/DL — SIGNIFICANT CHANGE UP (ref 14–18)
IMM GRANULOCYTES NFR BLD AUTO: 0.8 % — HIGH (ref 0.1–0.3)
INR BLD: 1.1 RATIO — SIGNIFICANT CHANGE UP (ref 0.65–1.3)
LYMPHOCYTES # BLD AUTO: 1.63 K/UL — SIGNIFICANT CHANGE UP (ref 1.2–3.4)
LYMPHOCYTES # BLD AUTO: 13.4 % — LOW (ref 20.5–51.1)
MCHC RBC-ENTMCNC: 28.7 PG — SIGNIFICANT CHANGE UP (ref 27–31)
MCHC RBC-ENTMCNC: 33.6 G/DL — SIGNIFICANT CHANGE UP (ref 32–37)
MCV RBC AUTO: 85.6 FL — SIGNIFICANT CHANGE UP (ref 80–94)
MONOCYTES # BLD AUTO: 0.59 K/UL — SIGNIFICANT CHANGE UP (ref 0.1–0.6)
MONOCYTES NFR BLD AUTO: 4.9 % — SIGNIFICANT CHANGE UP (ref 1.7–9.3)
NEUTROPHILS # BLD AUTO: 9.81 K/UL — HIGH (ref 1.4–6.5)
NEUTROPHILS NFR BLD AUTO: 80.8 % — HIGH (ref 42.2–75.2)
NRBC # BLD: 0 /100 WBCS — SIGNIFICANT CHANGE UP (ref 0–0)
PLATELET # BLD AUTO: 190 K/UL — SIGNIFICANT CHANGE UP (ref 130–400)
POTASSIUM SERPL-MCNC: 5 MMOL/L — SIGNIFICANT CHANGE UP (ref 3.5–5)
POTASSIUM SERPL-SCNC: 5 MMOL/L — SIGNIFICANT CHANGE UP (ref 3.5–5)
PROT SERPL-MCNC: 6.5 G/DL — SIGNIFICANT CHANGE UP (ref 6–8)
PROTHROM AB SERPL-ACNC: 12.7 SEC — SIGNIFICANT CHANGE UP (ref 9.95–12.87)
RBC # BLD: 5.29 M/UL — SIGNIFICANT CHANGE UP (ref 4.7–6.1)
RBC # FLD: 13.8 % — SIGNIFICANT CHANGE UP (ref 11.5–14.5)
SODIUM SERPL-SCNC: 135 MMOL/L — SIGNIFICANT CHANGE UP (ref 135–146)
WBC # BLD: 12.14 K/UL — HIGH (ref 4.8–10.8)
WBC # FLD AUTO: 12.14 K/UL — HIGH (ref 4.8–10.8)

## 2020-06-28 PROCEDURE — 99233 SBSQ HOSP IP/OBS HIGH 50: CPT

## 2020-06-28 PROCEDURE — 88311 DECALCIFY TISSUE: CPT | Mod: 26

## 2020-06-28 PROCEDURE — 63030 LAMOT DCMPRN NRV RT 1 LMBR: CPT | Mod: AS,RT

## 2020-06-28 PROCEDURE — 88304 TISSUE EXAM BY PATHOLOGIST: CPT | Mod: 26

## 2020-06-28 PROCEDURE — 99024 POSTOP FOLLOW-UP VISIT: CPT

## 2020-06-28 PROCEDURE — 63030 LAMOT DCMPRN NRV RT 1 LMBR: CPT | Mod: RT

## 2020-06-28 PROCEDURE — 37191 INS ENDOVAS VENA CAVA FILTR: CPT

## 2020-06-28 RX ORDER — SODIUM CHLORIDE 9 MG/ML
1000 INJECTION, SOLUTION INTRAVENOUS
Refills: 0 | Status: DISCONTINUED | OUTPATIENT
Start: 2020-06-28 | End: 2020-06-28

## 2020-06-28 RX ORDER — ONDANSETRON 8 MG/1
4 TABLET, FILM COATED ORAL ONCE
Refills: 0 | Status: DISCONTINUED | OUTPATIENT
Start: 2020-06-28 | End: 2020-06-28

## 2020-06-28 RX ORDER — HYDROMORPHONE HYDROCHLORIDE 2 MG/ML
1 INJECTION INTRAMUSCULAR; INTRAVENOUS; SUBCUTANEOUS
Refills: 0 | Status: DISCONTINUED | OUTPATIENT
Start: 2020-06-28 | End: 2020-06-28

## 2020-06-28 RX ORDER — HYDROMORPHONE HYDROCHLORIDE 2 MG/ML
0.5 INJECTION INTRAMUSCULAR; INTRAVENOUS; SUBCUTANEOUS
Refills: 0 | Status: DISCONTINUED | OUTPATIENT
Start: 2020-06-28 | End: 2020-06-28

## 2020-06-28 RX ORDER — MEPERIDINE HYDROCHLORIDE 50 MG/ML
12.5 INJECTION INTRAMUSCULAR; INTRAVENOUS; SUBCUTANEOUS
Refills: 0 | Status: DISCONTINUED | OUTPATIENT
Start: 2020-06-28 | End: 2020-06-28

## 2020-06-28 RX ADMIN — SODIUM CHLORIDE 125 MILLILITER(S): 9 INJECTION, SOLUTION INTRAVENOUS at 13:45

## 2020-06-28 RX ADMIN — Medication 102 MILLIGRAM(S): at 00:01

## 2020-06-28 RX ADMIN — HYDROMORPHONE HYDROCHLORIDE 0.5 MILLIGRAM(S): 2 INJECTION INTRAMUSCULAR; INTRAVENOUS; SUBCUTANEOUS at 13:45

## 2020-06-28 RX ADMIN — Medication 102 MILLIGRAM(S): at 05:16

## 2020-06-28 NOTE — PROGRESS NOTE ADULT - ASSESSMENT
49 y/o male with PMH of kidney stones, HTN, lumbar disc herniation s/p discectomy in 2019, and venous stasis presents with c/o back pain for 6 months which has progressive worsened over the past month radiating to lower limbs (R>L), inability to ambulate due to leg weakness and constipation liked due to degenerative spine disease vs less likely cord compression associated with new onset DVT    #LE weakness 2/2 acute spinal cord compression   planning for OR today for L2-L3 laminectomy   cont dexamethasone 10mg q6h as per neuro recs  cont present pain mgmt   f/u neurosx    #s/p laminectomy 6/28  -encourage incentive spirometry during post operative period  -bipap PRN post op   -maintain active T&S  -resume all home meds  -IVF hydration     #bilateral acute DVT  s/p IVC filter placement today  monitor right groin     #constipation  -cont with bowel regimen    #HTN  stable  -cont lisinopril     FULL code  PT eval post operatively     Progress Note Handoff  Pending: laminectomy, PT  Patient/Family discussion: POC discussed with pt  Disposition: acute

## 2020-06-28 NOTE — PROGRESS NOTE ADULT - ASSESSMENT
51 y/o male with PMH of kidney stones, HTN, lumbar disc herniation s/p discectomy in 2019, and venous stasis presents with c/o back pain for 6 months which has progressive worsened over the past month radiating to lower limbs (R>L), inability to ambulate due to leg weakness. Patient reports b/l lower extremity weakness associated with burning sensation radiating to b/l extremities (R>L). In the ED. US duplex was done showing a right peroneal and left posterior tibial thrombus likely from being bedridden for a month at home due to pain. He had been started on therapeutic lovenox. Vascular surgery consulted for placement of IVC Filter in anticipation of laminectomy with neurosurgery.    PLAN  NPO  OR for placement of IVC filter  Consent in chart

## 2020-06-28 NOTE — PROGRESS NOTE ADULT - SUBJECTIVE AND OBJECTIVE BOX
RAMIRO BETTENCOURT  50y, Male  Allergy: No Known Allergies  Hospital Day: 2d      Patient was seen and examined at bedside in PACU. A&Ox3 no active complaints           VITALS:  T(F): 96.6 (20 @ 07:08), Max: 97 (20 @ 16:30)  HR: 65 (20 @ 07:38)  BP: 116/60 (20 @ 07:38) (93/50 - 127/68)  RR: 20 (20 @ 07:38)  SpO2: 97% (20 @ 07:08)    PHYSICAL EXAM:  GENERAL: well developed well nourished in no acute distress  NECK: No evidence of swelling no palpable lymphadenopathy  CHEST/LUNG: clear to ausculation bilaterally no wheezes, rales, or rhonchi   HEART/VASC: RRR, No murmurs, rubs, or gallops appreciated, 2+ equal bilateral radial pulse  ABDOMEN: Soft, non tender non distended +bowel sounds in all quadrants, no palpable organomegaly   : right groin site of IVC insertion covered in clean white gauze no hematoma palpable   EXTREMITIES:  4/5 strength bilaterally sensation intact bilaterally.       TESTS & MEASUREMENTS:  Weight (Kg): 110 (20 @ 07:38)  BMI (kg/m2): 41.6 ()    20 @ 07:01  -  20 @ 07:00  --------------------------------------------------------  IN: 0 mL / OUT: 375 mL / NET: -375 mL                            15.2   12.14 )-----------( 190      ( 2020 05:26 )             45.3     PT/INR - ( 2020 05:26 )   PT: 12.70 sec;   INR: 1.10 ratio         PTT - ( 2020 07:38 )  PTT:33.7 sec      135  |  97<L>  |  27<H>  ----------------------------<  179<H>  5.0   |  24  |  0.8    Ca    9.3      2020 05:26  Mg     2.0         TPro  6.5  /  Alb  3.8  /  TBili  0.5  /  DBili  x   /  AST  18  /  ALT  37  /  AlkPhos  67      LIVER FUNCTIONS - ( 2020 05:26 )  Alb: 3.8 g/dL / Pro: 6.5 g/dL / ALK PHOS: 67 U/L / ALT: 37 U/L / AST: 18 U/L / GGT: x                 Culture - Urine (collected 20 @ 16:10)  Source: .Urine Clean Catch (Midstream)  Final Report (20 @ 18:06):    <10,000 CFU/mL Normal Urogenital Heather      Urinalysis Basic - ( 2020 16:10 )    Color: Yellow / Appearance: Clear / S.025 / pH: x  Gluc: x / Ketone: Negative  / Bili: Negative / Urobili: <2 mg/dL   Blood: x / Protein: Trace / Nitrite: Negative   Leuk Esterase: Negative / RBC: x / WBC x   Sq Epi: x / Non Sq Epi: x / Bacteria: x          RADIOLOGY & ADDITIONAL TESTS:    MEDICATIONS:  MEDICATIONS  (STANDING):    MEDICATIONS  (PRN):

## 2020-06-28 NOTE — CHART NOTE - NSCHARTNOTEFT_GEN_A_CORE
PACU ANESTHESIA ADMISSION NOTE      Procedure: Lumbar discectomy  IVC filter placement    Post op diagnosis:  Lumbar radiculitis  DVT, lower extremity      ____  Intubated  TV:______       Rate: ______      FiO2: ______    _x___  Patent Airway    ____  Full return of protective reflexes    ____  Full recovery from anesthesia / back to baseline status    Vitals:            T:     36.5           BP :     156/82        R:    15          Sat:    98           P:  62      Mental Status:  _x___ Awake   _____ Alert   _____ Drowsy   _____ Sedated    Nausea/Vomiting:  _x___  NO       ______Yes,   See Post - Op Orders         Pain Scale (0-10):  __0___    Treatment: _x___ None    ____ See Post - Op/PCA Orders    Post - Operative Fluids:   __x__ Oral   ____ See Post - Op Orders    Plan: Discharge:   ____Home       __x___Floor     _____Critical Care    _____  Other:_________________    Comments:  No anesthesia issues or complications noted.  Discharge when criteria met.

## 2020-06-28 NOTE — PROGRESS NOTE ADULT - SUBJECTIVE AND OBJECTIVE BOX
51 yo gentleman POD#0, s/p L2-3 Microdiskectomy with Dr Garcia.     Admitting HPI:  49 y/o male with PMH of kidney stones, HTN, lumbar disc herniation s/p discectomy in 2019, and venous stasis presents with c/o back pain for 6 months which has progressive worsened over the past month radiating to lower limbs (R>L), inability to ambulate due to leg weakness and constipation. Patient has been in [place] for past 4 months and had an X-ray done there which was nornal and was prescribed pain medications. Patient returned last night from [place] and came to the ED.  In ED, vital signs were 97.2 f, /94 mm hg, HR 84 bpm, RR 17 and 100% o2 on RA. Patient reports b/l lower extremity weakness associated with burning sensation radiating to b/l extremities (R>L). In the ED, preliminary doppler was done and positive for left femoral DVT likely from being bedridden for a month at home due to pain. No hx IVDA. Denies diarrhea, urinary sxs, fever, chills, chest pain, dyspnea, no saddle anesthesia, bowel/bladder incontinence/retention. (2020 15:19)      REVIEW OF SYSTEMS:      All other systems  reviewed as negative other than what was mentined in the HPI      Vital Signs Last 24 Hrs  T(C): 36.6 (2020 13:20), Max: 36.6 (2020 13:20)  T(F): 97.9 (2020 13:20), Max: 97.9 (2020 13:20)  HR: 62 (2020 13:35) (59 - 80)  BP: 134/73 (2020 13:35) (93/50 - 142/72)  BP(mean): --  RR: 18 (2020 13:35) (18 - 20)  SpO2: 100% (2020 13:35) (97% - 100%)    PHYSICAL EXAM:  GENERAL: NAD, well-groomed, well-developed  HEAD:  Atraumatic, normocephalic    WOUND: Dressing clean dry intact    MENTAL STATUS: AAO x3; Awake; Opens eyes spontaneously; Appropriately conversant without aphasia; following simple commands  CRANIAL NERVES: Visual acuity normal for age, visual fields full to confrontation, PERRL. EOMI without nystagmus. Facial sensation intact V1-3 distribution b/l. Face symmetric w/ normal eye closure and smile, tongue midline. Hearing grossly intact. Speech clear. Head turning and shoulder shrug intact.   REFLEXES: PERRL. Corneals intact b/l. Gag intact. Cough intact.  MOTOR:     SENSATION: grossly intact to light touch all extremities  COORDINATION: Gait intact; rapid alternating movements intact; heel to shin intact; no upper extremity dysmetria  CHEST/LUNG: Clear to auscultation bilaterally; no rales, rhonchi, wheezing, or rubs  HEART: +S1/+S2; Regular rate and rhythm; no murmurs, rubs, or gallops  ABDOMEN: Soft, nontender, nondistended; bowel sounds present all four quadrants  EXTREMITIES:  2+ peripheral pulses, no clubbing, cyanosis, or edema  SKIN: Warm, dry; no rashes or lesions    LABS:                        15.2   12.14 )-----------( 190      ( 2020 05:26 )             45.3         135  |  97<L>  |  27<H>  ----------------------------<  179<H>  5.0   |  24  |  0.8    Ca    9.3      2020 05:26  Mg     2.0         TPro  6.5  /  Alb  3.8  /  TBili  0.5  /  DBili  x   /  AST  18  /  ALT  37  /  AlkPhos  67      PT/INR - ( 2020 05:26 )   PT: 12.70 sec;   INR: 1.10 ratio         PTT - ( 2020 07:38 )  PTT:33.7 sec  Urinalysis Basic - ( 2020 16:10 )    Color: Yellow / Appearance: Clear / S.025 / pH: x  Gluc: x / Ketone: Negative  / Bili: Negative / Urobili: <2 mg/dL   Blood: x / Protein: Trace / Nitrite: Negative   Leuk Esterase: Negative / RBC: x / WBC x   Sq Epi: x / Non Sq Epi: x / Bacteria: x         @ 07:01  -   @ 07:00  --------------------------------------------------------  IN: 0 mL / OUT: 375 mL / NET: -375 mL        RADIOLOGY & ADDITIONAL TESTS:          CAPRINI SCORE [CLOT]:  Patient has an estimated Caprini score of greater than 5.  However, the patient's unique clinical situation will be addressed in an individual manner to determine appropriate anticoagulation treatment, if any. 51 yo gentleman POD#0, s/p L2-3 Microdiskectomy with Dr Garcia. Admits to having some incisional back pain    Admitting HPI:  51 y/o male with PMH of kidney stones, HTN, lumbar disc herniation s/p discectomy in 2019, and venous stasis presents with c/o back pain for 6 months which has progressive worsened over the past month radiating to lower limbs (R>L), inability to ambulate due to leg weakness and constipation. Patient has been in [place] for past 4 months and had an X-ray done there which was nornal and was prescribed pain medications. Patient returned last night from [place] and came to the ED.  In ED, vital signs were 97.2 f, /94 mm hg, HR 84 bpm, RR 17 and 100% o2 on RA. Patient reports b/l lower extremity weakness associated with burning sensation radiating to b/l extremities (R>L). In the ED, preliminary doppler was done and positive for left femoral DVT likely from being bedridden for a month at home due to pain. No hx IVDA. Denies diarrhea, urinary sxs, fever, chills, chest pain, dyspnea, no saddle anesthesia, bowel/bladder incontinence/retention. (2020 15:19)      REVIEW OF SYSTEMS:      All other systems  reviewed as negative other than what was mentined in the HPI      Vital Signs Last 24 Hrs  T(C): 36.6 (2020 13:20), Max: 36.6 (2020 13:20)  T(F): 97.9 (2020 13:20), Max: 97.9 (2020 13:20)  HR: 62 (2020 13:35) (59 - 80)  BP: 134/73 (2020 13:35) (93/50 - 142/72)  BP(mean): --  RR: 18 (2020 13:35) (18 - 20)  SpO2: 100% (2020 13:35) (97% - 100%)    PHYSICAL EXAM:  GENERAL: NAD, well-groomed, well-developed  HEAD:  Atraumatic, normocephalic    WOUND: Dressing clean dry intact    MENTAL STATUS: AAO x3; Awake; Opens eyes spontaneously; Appropriately conversant without aphasia; following simple commands  CRANIAL NERVES: Visual acuity normal for age, visual fields full to confrontation, PERRL. EOMI without nystagmus. Facial sensation intact V1-3 distribution b/l. Face symmetric w/ normal eye closure and smile, tongue midline. Hearing grossly intact. Speech clear. Head turning and shoulder shrug intact.   REFLEXES: PERRL. Corneals intact b/l. Gag intact. Cough intact.  MOTOR:    Upper: BUE strength 5/5    Lower: LEFT proximal strength greater than right;               Minimal EHL/dorsi/plantar flexion movement on the LEFT; proximal muscle groups approximately 3+- 4-/5              3/5 EHl, 4/5 Dorsi/plantar flexion RIGHT; proximal muscle groups 2/5 no knee flexion/extension   Severe muscle atrophy bilateral gastrocnemius    SENSATION: grossly intact to light touch all extremities      ABDOMEN: Soft, nontender, nondistended;   EXTREMITIES:  2+ peripheral pulses, no clubbing  SKIN: Warm, dry, visible signs of venous stasis    LABS:                        15.2   12.14 )-----------( 190      ( 2020 05:26 )             45.3     28    135  |  97<L>  |  27<H>  ----------------------------<  179<H>  5.0   |  24  |  0.8    Ca    9.3      2020 05:26  Mg     2.0         TPro  6.5  /  Alb  3.8  /  TBili  0.5  /  DBili  x   /  AST  18  /  ALT  37  /  AlkPhos  67  28    PT/INR - ( 2020 05:26 )   PT: 12.70 sec;   INR: 1.10 ratio         PTT - ( 2020 07:38 )  PTT:33.7 sec  Urinalysis Basic - ( 2020 16:10 )    Color: Yellow / Appearance: Clear / S.025 / pH: x  Gluc: x / Ketone: Negative  / Bili: Negative / Urobili: <2 mg/dL   Blood: x / Protein: Trace / Nitrite: Negative   Leuk Esterase: Negative / RBC: x / WBC x   Sq Epi: x / Non Sq Epi: x / Bacteria: x         @ 07:01  -   @ 07:00  --------------------------------------------------------  IN: 0 mL / OUT: 375 mL / NET: -375 mL

## 2020-06-28 NOTE — BRIEF OPERATIVE NOTE - NSICDXBRIEFPREOP_GEN_ALL_CORE_FT
PRE-OP DIAGNOSIS:  Lumbar radiculopathy 28-Jun-2020 12:56:33  Pat Garcia  Lumbar disc prolapse with root compression 28-Jun-2020 12:56:18  Pat Garcia
PRE-OP DIAGNOSIS:  DVT, lower extremity 28-Jun-2020 09:43:35  Missael Romeo

## 2020-06-28 NOTE — BRIEF OPERATIVE NOTE - NSICDXBRIEFPROCEDURE_GEN_ALL_CORE_FT
PROCEDURES:  Lumbar discectomy 28-Jun-2020 12:55:57  Pat Garcia
PROCEDURES:  IVC filter placement 28-Jun-2020 09:43:26  Missael Romeo

## 2020-06-28 NOTE — PROGRESS NOTE ADULT - SUBJECTIVE AND OBJECTIVE BOX
GENERAL SURGERY PROGRESS NOTE     RAMIRO BETTENCOURT  [address] [place] [address] day :2d  POD:  Procedure:   Surgical Attending: Coco Galvez  Overnight events:  NPO for or today    T(F): 96.6 (20 @ 07:08), Max: 97 (20 @ 16:30)  HR: 65 (20 @ 07:08) (59 - 79)  BP: 116/60 (20 @ 07:08) (93/50 - 127/68)  ABP: --  ABP(mean): --  RR: 20 (20 @ 07:08) (19 - 20)  SpO2: 97% (20 @ 07:08) (97% - 97%)      20 @ 07:01  -  20 @ 07:00  --------------------------------------------------------  IN:  Total IN: 0 mL    OUT:    Voided: 375 mL  Total OUT: 375 mL    Total NET: -375 mL       GI proph:  pantoprazole    Tablet 40 milliGRAM(s) Oral before breakfast    AC/ proph:   ABx:     PHYSICAL EXAM:  GENERAL: NAD, well-appearing  CHEST/LUNG: Clear to auscultation bilaterally  HEART: Regular rate and rhythm  ABDOMEN: Soft, Nontender, Nondistended;   EXTREMITIES: b/l LE Venous stasis, No clubbing, cyanosis, or edema    LABS  Labs:  CAPILLARY BLOOD GLUCOSE                        16.4   7.24  )-----------( 181      ( 2020 07:38 )             47.0       Auto Neutrophil %: 79.5 % (20 @ 07:38)  Auto Immature Granulocyte %: 0.6 % (20 @ 07:38)        133<L>  |  98  |  22<H>  ----------------------------<  192<H>  4.6   |  21  |  0.7      Calcium, Total Serum: 9.3 mg/dL (20 @ 11:40)    LFTs:             7.2  | 0.6  | 23       ------------------[74      ( 2020 07:38 )  3.9  | x    | 35          Lipase:x      Amylase:x             Coags:     12.40  ----< 1.08    ( 2020 07:38 )     33.7      Urinalysis Basic - ( 2020 16:10 )    Color: Yellow / Appearance: Clear / S.025 / pH: x  Gluc: x / Ketone: Negative  / Bili: Negative / Urobili: <2 mg/dL   Blood: x / Protein: Trace / Nitrite: Negative   Leuk Esterase: Negative / RBC: x / WBC x   Sq Epi: x / Non Sq Epi: x / Bacteria: x        Culture - Urine (collected 2020 16:10)  Source: .Urine Clean Catch (Midstream)  Final Report (2020 18:06):    <10,000 CFU/mL Normal Urogenital Heather

## 2020-06-28 NOTE — PROGRESS NOTE ADULT - ASSESSMENT
51 yo gentleman POD#0, s/p L2-3 Microdiskectomy      - Transfer back to medicine    - Continue steroids as previously dosed       Plan to taper starting 6/29 over 7 days    - Continue PT/rehab    - OK to resume Low dose Lovenox tonight       Ok for DVT prophylaxis at POD#3    - AM labs    - Monitor incision/dressing    - Robaxin 750 mg Q6 hours for spasm    - Pain control        Percocet 5/325 1 tab PO for mild pain/2 tabs PO for moderate pain       Dilaudid for break through    - Ice packs to low back as needed for comfort TID    - Nausea management        Zofran 4mg Q6 prn    - Bowel regimen        Miralax, MOM, Senna    - Will continue to follow    Above plan per Dr Garcia 49 yo gentleman POD#0, s/p L2-3 Microdiskectomy      - Transfer back to medicine    - Continue steroids as previously dosed       Plan to taper starting 6/29 over 7 days    - Continue PT/rehab    - B/L AFOs    - OK to resume Low dose Lovenox tonight       Ok for DVT prophylaxis at POD#3    - AM labs    - Monitor incision/dressing    - Robaxin 750 mg Q6 hours for spasm    - Pain control        Percocet 5/325 1 tab PO for mild pain/2 tabs PO for moderate pain       Dilaudid for break through    - Ice packs to low back as needed for comfort TID    - Nausea management        Zofran 4mg Q6 prn    - Bowel regimen        Miralax, MOM, Senna    - Will continue to follow    Above plan per Dr Garcia

## 2020-06-28 NOTE — CHART NOTE - NSCHARTNOTEFT_GEN_A_CORE
Post Operative Check    Patient is post op from a IVC filter placement. Patient is vitally stable. No pain in groin.      Vitals    T(C): 36.7 (06-28-20 @ 14:15), Max: 36.7 (06-28-20 @ 14:15)  HR: 56 (06-28-20 @ 14:15) (54 - 80)  BP: 133/74 (06-28-20 @ 14:15) (93/50 - 145/79)  RR: 18 (06-28-20 @ 14:15) (18 - 20)  SpO2: 98% (06-28-20 @ 14:15) (97% - 100%)  Wt(kg): --      06-27 @ 07:01  -  06-28 @ 07:00  --------------------------------------------------------  IN:  Total IN: 0 mL    OUT:    Voided: 375 mL  Total OUT: 375 mL    Total NET: -375 mL          Physical Exam  General: NAD AAOx3   Cards: RRR S1S2  Resp: CTAB  Abdomen: soft non tender non distended   Ext: right groin dressing c/d/i, no hematoma     Labs  Labs:  CAPILLARY BLOOD GLUCOSE                              15.2   12.14 )-----------( 190      ( 28 Jun 2020 05:26 )             45.3       Auto Neutrophil %: 80.8 % (06-28-20 @ 05:26)  Auto Immature Granulocyte %: 0.8 % (06-28-20 @ 05:26)    06-28    135  |  97<L>  |  27<H>  ----------------------------<  179<H>  5.0   |  24  |  0.8      Calcium, Total Serum: 9.3 mg/dL (06-28-20 @ 05:26)      LFTs:             6.5  | 0.5  | 18       ------------------[67      ( 28 Jun 2020 05:26 )  3.8  | x    | 37          Lipase:x      Amylase:x             Coags:     12.70  ----< 1.10    ( 28 Jun 2020 05:26 )     x               Culture - Urine (collected 26 Jun 2020 16:10)  Source: .Urine Clean Catch (Midstream)  Final Report (27 Jun 2020 18:06):    <10,000 CFU/mL Normal Urogenital Heather          Radiology: no post op studies       Patient is a 50y old Male s/p IVC filter placement.     Plan:  - f/u with Dr. Wu for removal

## 2020-06-29 LAB
ANION GAP SERPL CALC-SCNC: 11 MMOL/L — SIGNIFICANT CHANGE UP (ref 7–14)
BUN SERPL-MCNC: 33 MG/DL — HIGH (ref 10–20)
CALCIUM SERPL-MCNC: 8.7 MG/DL — SIGNIFICANT CHANGE UP (ref 8.5–10.1)
CHLORIDE SERPL-SCNC: 102 MMOL/L — SIGNIFICANT CHANGE UP (ref 98–110)
CO2 SERPL-SCNC: 27 MMOL/L — SIGNIFICANT CHANGE UP (ref 17–32)
CREAT SERPL-MCNC: 0.9 MG/DL — SIGNIFICANT CHANGE UP (ref 0.7–1.5)
GLUCOSE SERPL-MCNC: 130 MG/DL — HIGH (ref 70–99)
HCT VFR BLD CALC: 41 % — LOW (ref 42–52)
HGB BLD-MCNC: 14.2 G/DL — SIGNIFICANT CHANGE UP (ref 14–18)
MCHC RBC-ENTMCNC: 30.3 PG — SIGNIFICANT CHANGE UP (ref 27–31)
MCHC RBC-ENTMCNC: 34.6 G/DL — SIGNIFICANT CHANGE UP (ref 32–37)
MCV RBC AUTO: 87.4 FL — SIGNIFICANT CHANGE UP (ref 80–94)
NRBC # BLD: 0 /100 WBCS — SIGNIFICANT CHANGE UP (ref 0–0)
PLATELET # BLD AUTO: 178 K/UL — SIGNIFICANT CHANGE UP (ref 130–400)
POTASSIUM SERPL-MCNC: 4.6 MMOL/L — SIGNIFICANT CHANGE UP (ref 3.5–5)
POTASSIUM SERPL-SCNC: 4.6 MMOL/L — SIGNIFICANT CHANGE UP (ref 3.5–5)
RBC # BLD: 4.69 M/UL — LOW (ref 4.7–6.1)
RBC # FLD: 14.1 % — SIGNIFICANT CHANGE UP (ref 11.5–14.5)
SODIUM SERPL-SCNC: 140 MMOL/L — SIGNIFICANT CHANGE UP (ref 135–146)
WBC # BLD: 12.61 K/UL — HIGH (ref 4.8–10.8)
WBC # FLD AUTO: 12.61 K/UL — HIGH (ref 4.8–10.8)

## 2020-06-29 PROCEDURE — 99233 SBSQ HOSP IP/OBS HIGH 50: CPT

## 2020-06-29 PROCEDURE — 99231 SBSQ HOSP IP/OBS SF/LOW 25: CPT | Mod: GC

## 2020-06-29 RX ORDER — CHLORHEXIDINE GLUCONATE 213 G/1000ML
1 SOLUTION TOPICAL
Refills: 0 | Status: DISCONTINUED | OUTPATIENT
Start: 2020-06-29 | End: 2020-07-10

## 2020-06-29 RX ORDER — OXYCODONE AND ACETAMINOPHEN 5; 325 MG/1; MG/1
1 TABLET ORAL EVERY 6 HOURS
Refills: 0 | Status: DISCONTINUED | OUTPATIENT
Start: 2020-06-29 | End: 2020-07-02

## 2020-06-29 RX ORDER — OXYCODONE AND ACETAMINOPHEN 5; 325 MG/1; MG/1
2 TABLET ORAL EVERY 6 HOURS
Refills: 0 | Status: DISCONTINUED | OUTPATIENT
Start: 2020-06-29 | End: 2020-07-06

## 2020-06-29 RX ORDER — SENNA PLUS 8.6 MG/1
2 TABLET ORAL AT BEDTIME
Refills: 0 | Status: DISCONTINUED | OUTPATIENT
Start: 2020-06-29 | End: 2020-07-10

## 2020-06-29 RX ORDER — POLYETHYLENE GLYCOL 3350 17 G/17G
17 POWDER, FOR SOLUTION ORAL DAILY
Refills: 0 | Status: DISCONTINUED | OUTPATIENT
Start: 2020-06-29 | End: 2020-07-10

## 2020-06-29 RX ORDER — METHOCARBAMOL 500 MG/1
750 TABLET, FILM COATED ORAL EVERY 6 HOURS
Refills: 0 | Status: DISCONTINUED | OUTPATIENT
Start: 2020-06-29 | End: 2020-07-10

## 2020-06-29 RX ORDER — ONDANSETRON 8 MG/1
4 TABLET, FILM COATED ORAL EVERY 6 HOURS
Refills: 0 | Status: DISCONTINUED | OUTPATIENT
Start: 2020-06-29 | End: 2020-07-01

## 2020-06-29 RX ORDER — DEXAMETHASONE 0.5 MG/5ML
6 ELIXIR ORAL
Refills: 0 | Status: COMPLETED | OUTPATIENT
Start: 2020-06-30 | End: 2020-06-30

## 2020-06-29 RX ORDER — ENOXAPARIN SODIUM 100 MG/ML
30 INJECTION SUBCUTANEOUS AT BEDTIME
Refills: 0 | Status: DISCONTINUED | OUTPATIENT
Start: 2020-06-29 | End: 2020-07-01

## 2020-06-29 RX ORDER — DEXAMETHASONE 0.5 MG/5ML
10 ELIXIR ORAL EVERY 6 HOURS
Refills: 0 | Status: DISCONTINUED | OUTPATIENT
Start: 2020-06-29 | End: 2020-06-29

## 2020-06-29 RX ORDER — DEXAMETHASONE 0.5 MG/5ML
8 ELIXIR ORAL EVERY 6 HOURS
Refills: 0 | Status: COMPLETED | OUTPATIENT
Start: 2020-06-29 | End: 2020-06-30

## 2020-06-29 RX ADMIN — POLYETHYLENE GLYCOL 3350 17 GRAM(S): 17 POWDER, FOR SOLUTION ORAL at 10:52

## 2020-06-29 RX ADMIN — METHOCARBAMOL 750 MILLIGRAM(S): 500 TABLET, FILM COATED ORAL at 17:33

## 2020-06-29 RX ADMIN — Medication 8 MILLIGRAM(S): at 17:33

## 2020-06-29 RX ADMIN — ENOXAPARIN SODIUM 30 MILLIGRAM(S): 100 INJECTION SUBCUTANEOUS at 21:35

## 2020-06-29 RX ADMIN — Medication 8 MILLIGRAM(S): at 12:36

## 2020-06-29 RX ADMIN — OXYCODONE AND ACETAMINOPHEN 1 TABLET(S): 5; 325 TABLET ORAL at 21:33

## 2020-06-29 RX ADMIN — SENNA PLUS 2 TABLET(S): 8.6 TABLET ORAL at 21:34

## 2020-06-29 RX ADMIN — OXYCODONE AND ACETAMINOPHEN 2 TABLET(S): 5; 325 TABLET ORAL at 10:52

## 2020-06-29 RX ADMIN — METHOCARBAMOL 750 MILLIGRAM(S): 500 TABLET, FILM COATED ORAL at 12:36

## 2020-06-29 NOTE — PHYSICAL THERAPY INITIAL EVALUATION ADULT - IMPAIRMENTS CONTRIBUTING IMPAIRED BED MOBILITY, REHAB EVAL
pain/impaired balance/decreased flexibility/impaired motor control/abnormal muscle tone/decreased strength

## 2020-06-29 NOTE — PROGRESS NOTE ADULT - ASSESSMENT
50 year old male with right peroneal and left PT DVT unable to take therapeutic AC due to neurosurgical procedure s/p IVC filter placement 6/28.     Plan:   - f/u with Dr. Wu for IVC filter removal

## 2020-06-29 NOTE — PHYSICAL THERAPY INITIAL EVALUATION ADULT - IMPAIRED TRANSFERS: SIT/STAND, REHAB EVAL
impaired balance/decreased flexibility/abnormal muscle tone/decreased strength/impaired motor control/pain

## 2020-06-29 NOTE — PROGRESS NOTE ADULT - ASSESSMENT
49 yo male POD#1 s/p L2-3 Microdiskectomy      -  Plan to taper dexamethasone starting 6/29 over 7 days    - Continue PT/rehab    - need B/L AFOs    - OK to resume Low dose Lovenox today       Ok for regular DVT prophylaxis at POD#3    - Monitor incision/dressing    - Robaxin 750 mg Q6 hours for spasm    - Pain control        Dilaudid for break through 51 yo male POD#1 s/p L2-3 Microdiscectomy      -  Plan to taper dexamethasone starting 6/29 over 7 days    - Continue PT/rehab    - need B/L AFOs    - OK to resume Low dose Lovenox today       Ok for regular DVT prophylaxis at POD#3    - Monitor incision/dressing    - Robaxin 750 mg Q6 hours for spasm    - Pain control       case d/w Dr. Garcia

## 2020-06-29 NOTE — PROGRESS NOTE ADULT - SUBJECTIVE AND OBJECTIVE BOX
SUBJECTIVE:    Patient is a 50y old Male who presents with a chief complaint of back pain, new onset DVT (29 Jun 2020 13:01)    Currently admitted to medicine with the primary diagnosis of Back pain     Today is hospital day 3d. This morning he is resting comfortably in bed and reports still having back pain, and leg weakness, right worse than left.     PAST MEDICAL & SURGICAL HISTORY  Lumbar disc herniation  Kidney stones  HTN (hypertension)  Herniated lumbar intervertebral disc: discectomy  Kidney stone: ureteroscopy  Thrombus: thrombectomy of left leg    SOCIAL HISTORY:  Negative for smoking/alcohol/drug use.     ALLERGIES:  No Known Allergies    MEDICATIONS:  STANDING MEDICATIONS  chlorhexidine 4% Liquid 1 Application(s) Topical <User Schedule>  dexAMETHasone     Tablet 8 milliGRAM(s) Oral every 6 hours  enoxaparin Injectable 30 milliGRAM(s) SubCutaneous at bedtime  methocarbamol 750 milliGRAM(s) Oral every 6 hours  senna 2 Tablet(s) Oral at bedtime    PRN MEDICATIONS  ondansetron Injectable 4 milliGRAM(s) IV Push every 6 hours PRN  oxycodone    5 mG/acetaminophen 325 mG 1 Tablet(s) Oral every 6 hours PRN  oxycodone    5 mG/acetaminophen 325 mG 2 Tablet(s) Oral every 6 hours PRN  polyethylene glycol 3350 17 Gram(s) Oral daily PRN    VITALS:   T(F): 97.4  HR: 56  BP: 109/63  RR: 19  SpO2: 98%    LABS:                        14.2   12.61 )-----------( 178      ( 29 Jun 2020 06:09 )             41.0     06-29    140  |  102  |  33<H>  ----------------------------<  130<H>  4.6   |  27  |  0.9    Ca    8.7      29 Jun 2020 06:09    TPro  6.5  /  Alb  3.8  /  TBili  0.5  /  DBili  x   /  AST  18  /  ALT  37  /  AlkPhos  67  06-28    PT/INR - ( 28 Jun 2020 05:26 )   PT: 12.70 sec;   INR: 1.10 ratio                   Culture - Urine (collected 26 Jun 2020 16:10)  Source: .Urine Clean Catch (Midstream)  Final Report (27 Jun 2020 18:06):    <10,000 CFU/mL Normal Urogenital Heather          RADIOLOGY:    PHYSICAL EXAM:  GEN: No acute distress  LUNGS: Clear to auscultation bilaterally   HEART: S1/S2 present. RRR.   ABD: Soft, non-tender, non-distended. Bowel sounds present  EXT: NC/NC/NE/2+PP/TINEO, right groin with clean dressing  NEURO: AAOX3, limited range of motion of legs due to pain

## 2020-06-29 NOTE — PHYSICAL THERAPY INITIAL EVALUATION ADULT - PHYSICAL ASSIST/NONPHYSICAL ASSIST: SUPINE/SIT, REHAB EVAL
2 person assist Dorsal Nasal Flap Text: The defect edges were debeveled with a #15 scalpel blade.  Given the location of the defect and the proximity to free margins a dorsal nasal flap was deemed most appropriate.  Using a sterile surgical marker, an appropriate dorsal nasal flap was drawn around the defect.    The area thus outlined was incised deep to adipose tissue with a #15 scalpel blade.  The skin margins were undermined to an appropriate distance in all directions utilizing iris scissors.

## 2020-06-29 NOTE — PROGRESS NOTE ADULT - SUBJECTIVE AND OBJECTIVE BOX
POD # 1 S/P  L2-L3 microdiscectomy. patient c/o mild incisional pain, 5/10 on VAS, relieved with medications.   Patient states RLE movement is minimally improved post-op.  No new complaints.      Vital Signs Last 24 Hrs  T(C): 36.2 (29 Jun 2020 16:00), Max: 36.3 (29 Jun 2020 08:00)  T(F): 97.1 (29 Jun 2020 16:00), Max: 97.4 (29 Jun 2020 08:00)  HR: 66 (29 Jun 2020 16:00) (56 - 66)  BP: 121/59 (29 Jun 2020 16:00) (109/63 - 121/59)  BP(mean): --  RR: 19 (29 Jun 2020 16:00) (19 - 19)  SpO2: --    PHYSICAL EXAM:  GENERAL: NAD, well-groomed, well-developed  HEAD:  Atraumatic, normocephalic    WOUND: Dressing clean dry intact    MENTAL STATUS: AAO x3; Awake; Opens eyes spontaneously; Appropriately conversant without aphasia; following simple commands  CRANIAL NERVES: Visual acuity normal for age, visual fields full to confrontation, PERRL. EOMI without nystagmus. Facial sensation intact V1-3 distribution b/l. Face symmetric w/ normal eye closure and smile, tongue midline. Hearing grossly intact. Speech clear. Head turning and shoulder shrug intact.   REFLEXES: PERRL. Corneals intact b/l. Gag intact. Cough intact.  MOTOR:    MOTOR: strength 5/5 b/l upper extremities  Lowers      HF(L1/L2)     KE (L3)     DF (L4)     EHL (L5)     PF (S1)      R                     3-/5              3-/5           3/5           3/5           3/5  L                     4-/5               3/5          2/5            2/5           2/5  SENSATION: grossly intact to light touch all extremities      ABDOMEN: Soft, nontender, nondistended;   EXTREMITIES:   notable b/l venous stasis  SKIN: Warm, dry            MEDICATIONS:  Antibiotics:    Neuro:  methocarbamol 750 milliGRAM(s) Oral every 6 hours  ondansetron Injectable 4 milliGRAM(s) IV Push every 6 hours PRN  oxycodone    5 mG/acetaminophen 325 mG 1 Tablet(s) Oral every 6 hours PRN  oxycodone    5 mG/acetaminophen 325 mG 2 Tablet(s) Oral every 6 hours PRN    Anticoagulation:  enoxaparin Injectable 30 milliGRAM(s) SubCutaneous at bedtime    OTHER:  chlorhexidine 4% Liquid 1 Application(s) Topical <User Schedule>  dexAMETHasone     Tablet 8 milliGRAM(s) Oral every 6 hours  polyethylene glycol 3350 17 Gram(s) Oral daily PRN  senna 2 Tablet(s) Oral at bedtime    IVF:        LABS:                        14.2   12.61 )-----------( 178      ( 29 Jun 2020 06:09 )             41.0     06-29    140  |  102  |  33<H>  ----------------------------<  130<H>  4.6   |  27  |  0.9    Ca    8.7      29 Jun 2020 06:09    TPro  6.5  /  Alb  3.8  /  TBili  0.5  /  DBili  x   /  AST  18  /  ALT  37  /  AlkPhos  67  06-28    PT/INR - ( 28 Jun 2020 05:26 )   PT: 12.70 sec;   INR: 1.10 ratio               RADIOLOGY:

## 2020-06-29 NOTE — PROGRESS NOTE ADULT - SUBJECTIVE AND OBJECTIVE BOX
RAMIRO BETTENCOURT  50y, Male  Allergy: No Known Allergies  Hospital Day: 3d      Patient was seen and examined at bedside. continues to endorse bilateral LE weakness and the inability to move his left toes. endorsing discomfort at surgical site       VITALS:  T(F): 97.4 (06-29-20 @ 08:00), Max: 98 (06-28-20 @ 14:15)  HR: 56 (06-29-20 @ 08:00)  BP: 109/63 (06-29-20 @ 08:00) (104/58 - 145/79)  RR: 19 (06-29-20 @ 08:00)  SpO2: 98% (06-28-20 @ 14:15)    PHYSICAL EXAM:  GENERAL: obese male in no acute distress  NECK: No evidence of swelling no palpable lymphadenopathy  CHEST/LUNG: clear to ausculation bilaterally no wheezes, rales, or rhonchi   HEART/VASC: RRR, No murmurs, rubs, or gallops appreciated, 2+ equal bilateral radial pulse  ABDOMEN: Soft, non tender non distended +bowel sounds in all quadrants, no palpable organomegaly   : right groin site of IVC insertion covered in clean white gauze no hematoma palpable   EXTREMITIES:  4/5 strength bilaterally sensation equal and intact bilaterally. unable to move left toes motor intact right foot    TESTS & MEASUREMENTS:  Weight (Kg): 110 (06-28-20 @ 07:38)  BMI (kg/m2): 41.6 (06-28)    06-27-20 @ 07:01  -  06-28-20 @ 07:00  --------------------------------------------------------  IN: 0 mL / OUT: 375 mL / NET: -375 mL    06-28-20 @ 07:01  -  06-29-20 @ 07:00  --------------------------------------------------------  IN: 0 mL / OUT: 675 mL / NET: -675 mL    06-29-20 @ 07:01  -  06-29-20 @ 11:20  --------------------------------------------------------  IN: 730 mL / OUT: 475 mL / NET: 255 mL                            14.2   12.61 )-----------( 178      ( 29 Jun 2020 06:09 )             41.0     PT/INR - ( 28 Jun 2020 05:26 )   PT: 12.70 sec;   INR: 1.10 ratio           06-29    140  |  102  |  33<H>  ----------------------------<  130<H>  4.6   |  27  |  0.9    Ca    8.7      29 Jun 2020 06:09    TPro  6.5  /  Alb  3.8  /  TBili  0.5  /  DBili  x   /  AST  18  /  ALT  37  /  AlkPhos  67  06-28    LIVER FUNCTIONS - ( 28 Jun 2020 05:26 )  Alb: 3.8 g/dL / Pro: 6.5 g/dL / ALK PHOS: 67 U/L / ALT: 37 U/L / AST: 18 U/L / GGT: x                 Culture - Urine (collected 06-26-20 @ 16:10)  Source: .Urine Clean Catch (Midstream)  Final Report (06-27-20 @ 18:06):    <10,000 CFU/mL Normal Urogenital Heather        MEDICATIONS:  MEDICATIONS  (STANDING):  chlorhexidine 4% Liquid 1 Application(s) Topical <User Schedule>  dexAMETHasone  IVPB 10 milliGRAM(s) IV Intermittent every 6 hours  enoxaparin Injectable 30 milliGRAM(s) SubCutaneous at bedtime  methocarbamol 750 milliGRAM(s) Oral every 6 hours  senna 2 Tablet(s) Oral at bedtime    MEDICATIONS  (PRN):  ondansetron Injectable 4 milliGRAM(s) IV Push every 6 hours PRN Nausea and/or Vomiting  oxycodone    5 mG/acetaminophen 325 mG 1 Tablet(s) Oral every 6 hours PRN Mild Pain (1 - 3)  oxycodone    5 mG/acetaminophen 325 mG 2 Tablet(s) Oral every 6 hours PRN Moderate Pain (4 - 6)  polyethylene glycol 3350 17 Gram(s) Oral daily PRN Constipation

## 2020-06-29 NOTE — CONSULT NOTE ADULT - SUBJECTIVE AND OBJECTIVE BOX
HPI:  51 y/o male with PMH of kidney stones, HTN, lumbar disc herniation s/p discectomy in 2019, and venous stasis presents with c/o back pain for 6 months which has progressive worsened over the past month radiating to lower limbs (R>L), inability to ambulate due to leg weakness and constipation. Patient has been in [facility] for past 4 months and had an X-ray done there which was nornal and was prescribed pain medications. Patient returned last night from [facility] and came to the ED.  In ED, vital signs were 97.2 f, /94 mm hg, HR 84 bpm, RR 17 and 100% o2 on RA. Patient reports b/l lower extremity weakness associated with burning sensation radiating to b/l extremities (R>L). In the ED, preliminary doppler was done and positive for left femoral DVT likely from being bedridden for a month at home due to pain. No hx IVDA. Denies diarrhea, urinary sxs, fever, chills, chest pain, dyspnea, no saddle anesthesia, bowel/bladder incontinence/retention. (26 Jun 2020 15:19)      PAST MEDICAL & SURGICAL HISTORY:  Lumbar disc herniation  Kidney stones  HTN (hypertension)  Herniated lumbar intervertebral disc: discectomy  Kidney stone: ureteroscopy  Thrombus: thrombectomy of left leg      Hospital Course: s/p L2-3 MLD on 6/28    TODAY'S SUBJECTIVE & REVIEW OF SYMPTOMS:     Constitutional WNL   Cardio WNL   Resp WNL   GI WNL  Heme WNL  Endo WNL  Skin WNL  MSK pain / weakness of legs  Neuro WNL  Cognitive WNL  Psych WNL      MEDICATIONS  (STANDING):  chlorhexidine 4% Liquid 1 Application(s) Topical <User Schedule>  dexAMETHasone     Tablet 8 milliGRAM(s) Oral every 6 hours  enoxaparin Injectable 30 milliGRAM(s) SubCutaneous at bedtime  methocarbamol 750 milliGRAM(s) Oral every 6 hours  senna 2 Tablet(s) Oral at bedtime    MEDICATIONS  (PRN):  ondansetron Injectable 4 milliGRAM(s) IV Push every 6 hours PRN Nausea and/or Vomiting  oxycodone    5 mG/acetaminophen 325 mG 1 Tablet(s) Oral every 6 hours PRN Mild Pain (1 - 3)  oxycodone    5 mG/acetaminophen 325 mG 2 Tablet(s) Oral every 6 hours PRN Moderate Pain (4 - 6)  polyethylene glycol 3350 17 Gram(s) Oral daily PRN Constipation      FAMILY HISTORY:      Allergies    No Known Allergies    Intolerances        SOCIAL HISTORY:    [  ] Etoh  [  ] Smoking  [  ] Substance abuse     Home Environment:  [  ] Home Alone  [ x ] Lives with Family  [  ] Home Health Aid    Dwelling:  [  ] Apartment  [ x ] Private House  [  ] Adult Home  [  ] Skilled Nursing Facility      [  ] Short Term  [  ] Long Term  [  ] Stairs       Elevator [  ]    FUNCTIONAL STATUS PTA: (Check all that apply)  Ambulation: [   ]Independent    [ x ] Dependent     [  ] Non-Ambulatory  Assistive Device: [  ] SA Cane  [  ]  Q Cane  [  ] Walker  [  ]  Wheelchair  ADL : [  ] Independent  [x  ]  Dependent       Vital Signs Last 24 Hrs  T(C): 36.3 (29 Jun 2020 08:00), Max: 36.7 (28 Jun 2020 14:15)  T(F): 97.4 (29 Jun 2020 08:00), Max: 98 (28 Jun 2020 14:15)  HR: 56 (29 Jun 2020 08:00) (52 - 80)  BP: 109/63 (29 Jun 2020 08:00) (104/58 - 145/79)  BP(mean): --  RR: 19 (29 Jun 2020 08:00) (18 - 20)  SpO2: 98% (28 Jun 2020 14:15) (98% - 100%)      PHYSICAL EXAM: Alert & Oriented X3  GENERAL: NAD, well-groomed, well-developed  HEAD:  Atraumatic, Normocephalic  CHEST/LUNG: Clear   HEART: S1S2+  ABDOMEN: Soft, Nontender  EXTREMITIES:  no calf tenderness    NERVOUS SYSTEM:  Cranial Nerves 2-12 intact [  ] Abnormal  [  ]  ROM: WFL all extremities [  ]  Abnormal [ x ]limited shyla legs  Motor Strength: WFL all extremities  [  ]  Abnormal [x  ]limited shyla legs  Sensation: intact to light touch [ x ] Abnormal [  ]  Reflexes: Symmetric [  ]  Abnormal [  ]    FUNCTIONAL STATUS:  Bed Mobility: Independent [  ]  Supervision [  ]  Needs Assistance [ x ]  N/A [  ]  Transfers: Independent [  ]  Supervision [  ]  Needs Assistance [ x ]  N/A [  ]   Ambulation: Independent [  ]  Supervision [  ]  Needs Assistance [  ]  N/A [  ]  ADL: Independent [  ] Requires Assistance [  ] N/A [  ]      LABS:                        14.2   12.61 )-----------( 178      ( 29 Jun 2020 06:09 )             41.0     06-29    140  |  102  |  33<H>  ----------------------------<  130<H>  4.6   |  27  |  0.9    Ca    8.7      29 Jun 2020 06:09    TPro  6.5  /  Alb  3.8  /  TBili  0.5  /  DBili  x   /  AST  18  /  ALT  37  /  AlkPhos  67  06-28    PT/INR - ( 28 Jun 2020 05:26 )   PT: 12.70 sec;   INR: 1.10 ratio               RADIOLOGY & ADDITIONAL STUDIES:    Assesment:

## 2020-06-29 NOTE — PHYSICAL THERAPY INITIAL EVALUATION ADULT - PERTINENT HX OF CURRENT PROBLEM, REHAB EVAL
Patient c/o back pain, bed bound for a month prior. Underwent L2-3 micrdisectomy on 6/28, S/P IVC filter

## 2020-06-29 NOTE — PROGRESS NOTE ADULT - ASSESSMENT
51 y/o male with PMH of kidney stones, HTN, lumbar disc herniation s/p discectomy in 2019, and venous stasis presents with c/o back pain     #L1-L3 disk herniation/Spinal Cord Compression  S/p Microdiscectomy 6/28   Dexamethasone taper, 8 mg q6 today, 6mg q6 (6/30), 4mg q4 (7/1), 4mg q12 (7/2), 2mg BID (7/13) and then d/c  Pain control Percocet 1 tab mild pain, 2 tab moderate pain, Dilaudid if needed  Robaxin 750 q6  Can resume Lovenox 30mg qhs today, can increase to 40mg qhs on 7/1  Continue PT    #Left posterior tibial and right peroneal veins DVT  Could not be anticoagulated due to planned operation  S/p IVC filter 6/28 subcutaneous BID  O/p with Schor for IVC filter removal    #Constipation likely from opioid medication at home for back pain  Senna 2 tablets  Miralax 17 grams q/day  Encourage ambulation with PT    #HTN  Holding Lisinopril due to Low BP    #Diet: DASH    #Activity: As tolerated  #DVT Prophylaxis: Low dose Lovenox  #GI Prophylaxis: pantoprazole 40 mg q day  #Code Status: Full code   #Dispo: From home, might need STR

## 2020-06-29 NOTE — PROGRESS NOTE ADULT - ASSESSMENT
49 y/o male with PMH of kidney stones, HTN, lumbar disc herniation s/p discectomy in 2019, and venous stasis presents with c/o back pain for 6 months which has progressive worsened over the past month radiating to lower limbs (R>L), inability to ambulate due to leg weakness and constipation liked due to degenerative spine disease vs less likely cord compression associated with new onset DVT    #LE weakness 2/2 acute spinal cord compression   s/p L2-L3 discectomy   cont dexamethasone 10mg q6h as per neuro recs with plan to taper   cont present pain mgmt   f/u neurosx for final recs   -PT eval     #s/p discectomy 6/28  -encourage incentive spirometry during post operative period  -bipap PRN post op   -maintain active T&S  -resume all home meds  -IVF hydration     #bilateral acute DVT  s/p IVC filter placement today  monitor right groin   -f/u vascular for removal of filter     #constipation  -cont with bowel regimen    #HTN  stable  -cont lisinopril     FULL code      Progress Note Handoff  Pending: intravenous steroid taper, PT   Patient/Family discussion: POC discussed with pt  Disposition: acute at this time likely will need STR/SNF 49 y/o male with PMH of kidney stones, HTN, lumbar disc herniation s/p discectomy in 2019, and venous stasis presents with c/o back pain for 6 months which has progressive worsened over the past month radiating to lower limbs (R>L), inability to ambulate due to leg weakness and constipation liked due to degenerative spine disease vs less likely cord compression associated with new onset DVT    #LE weakness 2/2 acute spinal cord compression   s/p L2-L3 discectomy   cont dexamethasone 10mg q6h as per neuro recs with plan to taper   cont present pain mgmt   f/u neurosx for final recs   -PT eval     #s/p discectomy 6/28  -encourage incentive spirometry during post operative period  -bipap PRN post op   -maintain active T&S  -resume all home meds  -IVF hydration     #bilateral acute DVT  s/p IVC filter placement today  monitor right groin   -f/u vascular for removal of filter     #constipation  -cont with bowel regimen    #HTN  stable  -cont lisinopril     FULL code      Progress Note Handoff  Pending: steroid taper, PT, neurosx and vascular f/u  Patient/Family discussion: POC discussed with pt  Disposition: acute at this time likely will need STR/SNF

## 2020-06-29 NOTE — CONSULT NOTE ADULT - ASSESSMENT
IMPRESSION: Rehab of L2-3 MLD    PRECAUTIONS: [  ] Cardiac  [  ] Respiratory  [  ] Seizures [  ] Contact Isolation  [  ] Droplet Isolation  [  ] Other    Weight Bearing Status:     RECOMMENDATION:    Out of Bed to Chair     DVT/Decubiti Prophylaxis    REHAB PLAN:     [x   ] Bedside P/T 3-5 times a week   [ x  ]   Bedside O/T  2-3 times a week             [   ] No Rehab Therapy Indicated                   [   ]  Speech Therapy   Conditioning/ROM                                    ADL  Bed Mobility                                               Conditioning/ROM  Transfers                                                     Bed Mobility  Sitting /Standing Balance                         Transfers                                        Gait Training                                               Sitting/Standing Balance  Stair Training [   ]Applicable                    Home equipment Eval                                                                        Splinting  [   ] Only      GOALS:   ADL   [ x  ]   Independent                    Transfers  [ x  ] Independent                          Ambulation  [x   ] Independent     [ x   ] With device                            [   ]  CG                                                         [   ]  CG                                                                  [   ] CG                            [    ] Min A                                                   [   ] Min A                                                              [   ] Min  A          DISCHARGE PLAN:   [   ]  Good candidate for Intensive Rehabilitation/Hospital based                                             Will tolerate 3hrs Intensive Rehab Daily                                       [  x  ]  Short Term Rehab in Skilled Nursing Facility                              vs         [  x  ]  Home with Outpatient or VN services                                         [    ]  Possible Candidate for Intensive Hospital based Rehab

## 2020-06-29 NOTE — OCCUPATIONAL THERAPY INITIAL EVALUATION ADULT - GENERAL OBSERVATIONS, REHAB EVAL
Pt found supine in bed, + IV lock. Tx: [phone]. Pt chart thoroughly reviewed prior to OT evaluation

## 2020-06-29 NOTE — PHYSICAL THERAPY INITIAL EVALUATION ADULT - GENERAL OBSERVATIONS, REHAB EVAL
Attempted to see pt for PT Initial Evaluation, pt is on strict bedrest. Will f/u for PT when medically appropriate.
Patient encountered lying in bed, agreed to participate in therapy. Patient c/o pain on low back incisional site

## 2020-06-29 NOTE — OCCUPATIONAL THERAPY INITIAL EVALUATION ADULT - ADDITIONAL COMMENTS
One month prior to admission, patient was independent in ADLs/transfers, working full time. Patient went to [place] a month ago and report pain on low back and weakness on B/L LE. Since then, patient has been bed bound due to severe weakness on B/L LE and pain, unable to care for self.

## 2020-06-29 NOTE — PROGRESS NOTE ADULT - SUBJECTIVE AND OBJECTIVE BOX
GENERAL SURGERY PROGRESS NOTE     RAMIRO BETTENCOURT  50y  Male  Hospital day :3d  POD:  Procedure: Lumbar discectomy  IVC filter placement    OVERNIGHT EVENTS: s/p IVC filter    T(F): 97.1 (06-29-20 @ 00:01), Max: 98 (06-28-20 @ 14:15)  HR: 60 (06-29-20 @ 00:01) (52 - 80)  BP: 114/65 (06-29-20 @ 00:01) (104/58 - 145/79)  ABP: --  ABP(mean): --  RR: 19 (06-29-20 @ 00:01) (18 - 20)  SpO2: 98% (06-28-20 @ 14:15) (97% - 100%)      PHYSICAL EXAM:  GENERAL: NAD, well-appearing  EXTREMITIES: right groin dressing c/d/i       LABS  Labs:  CAPILLARY BLOOD GLUCOSE                              15.2   12.14 )-----------( 190      ( 28 Jun 2020 05:26 )             45.3       Auto Neutrophil %: 80.8 % (06-28-20 @ 05:26)  Auto Immature Granulocyte %: 0.8 % (06-28-20 @ 05:26)    06-28    135  |  97<L>  |  27<H>  ----------------------------<  179<H>  5.0   |  24  |  0.8      Calcium, Total Serum: 9.3 mg/dL (06-28-20 @ 05:26)      LFTs:             6.5  | 0.5  | 18       ------------------[67      ( 28 Jun 2020 05:26 )  3.8  | x    | 37          Lipase:x      Amylase:x             Coags:     12.70  ----< 1.10    ( 28 Jun 2020 05:26 )     x             Culture - Urine (collected 26 Jun 2020 16:10)  Source: .Urine Clean Catch (Midstream)  Final Report (27 Jun 2020 18:06):    <10,000 CFU/mL Normal Urogenital Heather          RADIOLOGY & ADDITIONAL TESTS: no relevant studies

## 2020-06-30 LAB
APPEARANCE UR: CLEAR — SIGNIFICANT CHANGE UP
BILIRUB UR-MCNC: NEGATIVE — SIGNIFICANT CHANGE UP
COLOR SPEC: YELLOW — SIGNIFICANT CHANGE UP
DIFF PNL FLD: NEGATIVE — SIGNIFICANT CHANGE UP
GLUCOSE UR QL: ABNORMAL
KETONES UR-MCNC: NEGATIVE — SIGNIFICANT CHANGE UP
LEUKOCYTE ESTERASE UR-ACNC: NEGATIVE — SIGNIFICANT CHANGE UP
NITRITE UR-MCNC: NEGATIVE — SIGNIFICANT CHANGE UP
PH UR: 6 — SIGNIFICANT CHANGE UP (ref 5–8)
PROT UR-MCNC: SIGNIFICANT CHANGE UP
SARS-COV-2 RNA SPEC QL NAA+PROBE: SIGNIFICANT CHANGE UP
SP GR SPEC: 1.03 — HIGH (ref 1.01–1.02)
SURGICAL PATHOLOGY STUDY: SIGNIFICANT CHANGE UP
UROBILINOGEN FLD QL: ABNORMAL

## 2020-06-30 PROCEDURE — 99233 SBSQ HOSP IP/OBS HIGH 50: CPT

## 2020-06-30 RX ORDER — DEXAMETHASONE 0.5 MG/5ML
4 ELIXIR ORAL EVERY 12 HOURS
Refills: 0 | Status: COMPLETED | OUTPATIENT
Start: 2020-07-02 | End: 2020-07-02

## 2020-06-30 RX ORDER — DEXAMETHASONE 0.5 MG/5ML
4 ELIXIR ORAL EVERY 6 HOURS
Refills: 0 | Status: COMPLETED | OUTPATIENT
Start: 2020-07-01 | End: 2020-07-01

## 2020-06-30 RX ADMIN — METHOCARBAMOL 750 MILLIGRAM(S): 500 TABLET, FILM COATED ORAL at 12:44

## 2020-06-30 RX ADMIN — METHOCARBAMOL 750 MILLIGRAM(S): 500 TABLET, FILM COATED ORAL at 23:51

## 2020-06-30 RX ADMIN — CHLORHEXIDINE GLUCONATE 1 APPLICATION(S): 213 SOLUTION TOPICAL at 05:59

## 2020-06-30 RX ADMIN — SENNA PLUS 2 TABLET(S): 8.6 TABLET ORAL at 22:31

## 2020-06-30 RX ADMIN — METHOCARBAMOL 750 MILLIGRAM(S): 500 TABLET, FILM COATED ORAL at 17:46

## 2020-06-30 RX ADMIN — ENOXAPARIN SODIUM 30 MILLIGRAM(S): 100 INJECTION SUBCUTANEOUS at 22:31

## 2020-06-30 RX ADMIN — METHOCARBAMOL 750 MILLIGRAM(S): 500 TABLET, FILM COATED ORAL at 05:59

## 2020-06-30 RX ADMIN — Medication 6 MILLIGRAM(S): at 17:46

## 2020-06-30 RX ADMIN — Medication 4 MILLIGRAM(S): at 23:51

## 2020-06-30 RX ADMIN — Medication 8 MILLIGRAM(S): at 00:05

## 2020-06-30 RX ADMIN — METHOCARBAMOL 750 MILLIGRAM(S): 500 TABLET, FILM COATED ORAL at 00:05

## 2020-06-30 RX ADMIN — Medication 8 MILLIGRAM(S): at 05:59

## 2020-06-30 RX ADMIN — Medication 6 MILLIGRAM(S): at 12:43

## 2020-06-30 NOTE — PROGRESS NOTE ADULT - SUBJECTIVE AND OBJECTIVE BOX
Patient seen / chart reviewed         afebrile         vss       lung ; clear       ext ; weakness shyla legs right worse than left with normal sensation to light touch     function : bed mobility with max A                   sit to stand with max A                   bed to chair transfer - unable to do

## 2020-06-30 NOTE — PROGRESS NOTE ADULT - ASSESSMENT
49 y/o male with PMH of kidney stones, HTN, lumbar disc herniation s/p discectomy in 2019, and venous stasis presents with c/o back pain for 6 months which has progressive worsened over the past month radiating to lower limbs (R>L), inability to ambulate due to leg weakness and constipation liked due to degenerative spine disease vs less likely cord compression associated with new onset DVT    #LE weakness 2/2 acute spinal cord compression   s/p L2-L3 discectomy   cont dexamethasone 10mg q6h as per neuro recs with plan to taper   cont present pain mgmt   -PT     #s/p discectomy 6/28  -encourage incentive spirometry during post operative period  -bipap PRN post op   -maintain active T&S  -resume all home meds  -IVF hydration     #bilateral acute DVT  s/p IVC filter placement today  monitor right groin   -f/u vascular for removal of filter     #constipation  -cont with bowel regimen    #HTN  stable  -cont lisinopril     FULL code      Progress Note Handoff  Pending: steroid taper, PT,safe dispo plan   Patient/Family discussion: POC discussed with pt  Disposition: acute at this time likely will need STR/SNF, 4A?

## 2020-06-30 NOTE — PROGRESS NOTE ADULT - SUBJECTIVE AND OBJECTIVE BOX
RAMIRO BETTENCOURT  50y, Male  Allergy: No Known Allergies  Hospital Day: 4d      Patient was seen and examined at bedside. continues to endorse weakness but states he is now able to move his left toes. no other complaints.           VITALS:  T(F): 96.4 (06-30-20 @ 07:27), Max: 97.1 (06-29-20 @ 16:00)  HR: 55 (06-30-20 @ 07:27)  BP: 122/71 (06-30-20 @ 07:27) (121/59 - 139/73)  RR: 20 (06-30-20 @ 07:27)  SpO2: --    PHYSICAL EXAM:  GENERAL: obese male in no acute distress  NECK: No evidence of swelling no palpable lymphadenopathy  CHEST/LUNG: clear to ausculation bilaterally no wheezes, rales, or rhonchi   HEART/VASC: RRR, No murmurs, rubs, or gallops appreciated, 2+ equal bilateral radial pulse  ABDOMEN: Soft, non tender non distended +bowel sounds in all quadrants, no palpable organomegaly   : right groin site of IVC insertion covered in clean white gauze no hematoma palpable   EXTREMITIES:  4/5 strength bilaterally sensation equal and intact bilaterally. today able to move left toes motor intact right foot    TESTS & MEASUREMENTS:  Weight (Kg):   BMI (kg/m2): 41.6 (06-28)    06-28-20 @ 07:01  -  06-29-20 @ 07:00  --------------------------------------------------------  IN: 0 mL / OUT: 675 mL / NET: -675 mL    06-29-20 @ 07:01  -  06-30-20 @ 07:00  --------------------------------------------------------  IN: 730 mL / OUT: 2375 mL / NET: -1645 mL    06-30-20 @ 07:01  -  06-30-20 @ 12:21  --------------------------------------------------------  IN: 570 mL / OUT: 300 mL / NET: 270 mL                            14.2   12.61 )-----------( 178      ( 29 Jun 2020 06:09 )             41.0       06-29    140  |  102  |  33<H>  ----------------------------<  130<H>  4.6   |  27  |  0.9    Ca    8.7      29 Jun 2020 06:09        Culture - Urine (collected 06-26-20 @ 16:10)  Source: .Urine Clean Catch (Midstream)  Final Report (06-27-20 @ 18:06):    <10,000 CFU/mL Normal Urogenital Heather      MEDICATIONS:  MEDICATIONS  (STANDING):  chlorhexidine 4% Liquid 1 Application(s) Topical <User Schedule>  dexAMETHasone     Tablet 6 milliGRAM(s) Oral four times a day  enoxaparin Injectable 30 milliGRAM(s) SubCutaneous at bedtime  methocarbamol 750 milliGRAM(s) Oral every 6 hours  senna 2 Tablet(s) Oral at bedtime    MEDICATIONS  (PRN):  ondansetron Injectable 4 milliGRAM(s) IV Push every 6 hours PRN Nausea and/or Vomiting  oxycodone    5 mG/acetaminophen 325 mG 1 Tablet(s) Oral every 6 hours PRN Mild Pain (1 - 3)  oxycodone    5 mG/acetaminophen 325 mG 2 Tablet(s) Oral every 6 hours PRN Moderate Pain (4 - 6)  polyethylene glycol 3350 17 Gram(s) Oral daily PRN Constipation

## 2020-06-30 NOTE — PROGRESS NOTE ADULT - ASSESSMENT
51 y/o male with PMH of kidney stones, HTN, lumbar disc herniation s/p discectomy in 2019, and venous stasis presents with c/o back pain     #L1-L3 disk herniation/Spinal Cord Compression  S/p Microdiscectomy 6/28   Dexamethasone taper 6mg q6 (6/30), 4mg q6 (7/1), 4mg q12 (7/2), 2mg q12 (7/13) and then d/c  Pain control Percocet 1 tab mild pain, 2 tab moderate pain, Dilaudid if needed  Robaxin 750 q6  Continue Lovenox 30mg qhs today, can increase to 40mg qhs on 7/1  Continue PT    #Left posterior tibial and right peroneal veins DVT  Could not be anticoagulated due to planned operation  S/p IVC filter 6/28 subcutaneous BID  O/p with Schor for IVC filter removal    #Constipation likely from opioid medication at home for back pain  Senna 2 tablets  Miralax 17 grams q/day  Encourage ambulation with PT    #HTN  Holding Lisinopril due to Low BP    #Diet: DASH    #Activity: As tolerated  #DVT Prophylaxis: Low dose Lovenox  #GI Prophylaxis: pantoprazole 40 mg q day  #Code Status: Full code   #Dispo: From home, no insurance for STR, pending repeat physiatry eval for 4A

## 2020-06-30 NOTE — PROGRESS NOTE ADULT - SUBJECTIVE AND OBJECTIVE BOX
SUBJECTIVE:    Patient is a 50y old Male who presents with a chief complaint of back pain, new onset DVT (30 Jun 2020 12:20)    Currently admitted to medicine with the primary diagnosis of Back pain     Today is hospital day 4d. This morning he is resting comfortably in bed and reports no new issues or overnight events.     PAST MEDICAL & SURGICAL HISTORY  Lumbar disc herniation  Kidney stones  HTN (hypertension)  Herniated lumbar intervertebral disc: discectomy  Kidney stone: ureteroscopy  Thrombus: thrombectomy of left leg    SOCIAL HISTORY:  Negative for smoking/alcohol/drug use.     ALLERGIES:  No Known Allergies    MEDICATIONS:  STANDING MEDICATIONS  chlorhexidine 4% Liquid 1 Application(s) Topical <User Schedule>  dexAMETHasone     Tablet 6 milliGRAM(s) Oral four times a day  enoxaparin Injectable 30 milliGRAM(s) SubCutaneous at bedtime  methocarbamol 750 milliGRAM(s) Oral every 6 hours  senna 2 Tablet(s) Oral at bedtime    PRN MEDICATIONS  ondansetron Injectable 4 milliGRAM(s) IV Push every 6 hours PRN  oxycodone    5 mG/acetaminophen 325 mG 1 Tablet(s) Oral every 6 hours PRN  oxycodone    5 mG/acetaminophen 325 mG 2 Tablet(s) Oral every 6 hours PRN  polyethylene glycol 3350 17 Gram(s) Oral daily PRN    VITALS:   T(F): 96.4  HR: 55  BP: 122/71  RR: 20  SpO2: --    LABS:                        14.2   12.61 )-----------( 178      ( 29 Jun 2020 06:09 )             41.0     06-29    140  |  102  |  33<H>  ----------------------------<  130<H>  4.6   |  27  |  0.9    Ca    8.7      29 Jun 2020 06:09                    RADIOLOGY:    PHYSICAL EXAM:  GEN: No acute distress  LUNGS: Clear to auscultation bilaterally   HEART: S1/S2 present. RRR.   ABD: Soft, non-tender, non-distended. Bowel sounds present  EXT: NC/NC/NE/2+PP/TINEO  NEURO: AAOX3

## 2020-06-30 NOTE — PROGRESS NOTE ADULT - ASSESSMENT
Imp : s/p L2-3 discectomy with LE weakness  Plan : continue bedside therapy as tolerated          not a 4a candidate - would not be able to participate 3hr PT/OT daily

## 2020-07-01 PROCEDURE — 99232 SBSQ HOSP IP/OBS MODERATE 35: CPT

## 2020-07-01 RX ORDER — ENOXAPARIN SODIUM 100 MG/ML
40 INJECTION SUBCUTANEOUS AT BEDTIME
Refills: 0 | Status: DISCONTINUED | OUTPATIENT
Start: 2020-07-01 | End: 2020-07-02

## 2020-07-01 RX ORDER — AMLODIPINE BESYLATE 2.5 MG/1
5 TABLET ORAL DAILY
Refills: 0 | Status: DISCONTINUED | OUTPATIENT
Start: 2020-07-01 | End: 2020-07-10

## 2020-07-01 RX ADMIN — AMLODIPINE BESYLATE 5 MILLIGRAM(S): 2.5 TABLET ORAL at 17:12

## 2020-07-01 RX ADMIN — Medication 4 MILLIGRAM(S): at 05:18

## 2020-07-01 RX ADMIN — METHOCARBAMOL 750 MILLIGRAM(S): 500 TABLET, FILM COATED ORAL at 05:18

## 2020-07-01 RX ADMIN — METHOCARBAMOL 750 MILLIGRAM(S): 500 TABLET, FILM COATED ORAL at 23:20

## 2020-07-01 RX ADMIN — Medication 4 MILLIGRAM(S): at 11:05

## 2020-07-01 RX ADMIN — METHOCARBAMOL 750 MILLIGRAM(S): 500 TABLET, FILM COATED ORAL at 17:12

## 2020-07-01 RX ADMIN — Medication 4 MILLIGRAM(S): at 17:12

## 2020-07-01 RX ADMIN — ENOXAPARIN SODIUM 40 MILLIGRAM(S): 100 INJECTION SUBCUTANEOUS at 21:05

## 2020-07-01 RX ADMIN — SENNA PLUS 2 TABLET(S): 8.6 TABLET ORAL at 21:04

## 2020-07-01 RX ADMIN — METHOCARBAMOL 750 MILLIGRAM(S): 500 TABLET, FILM COATED ORAL at 11:05

## 2020-07-01 NOTE — PROGRESS NOTE ADULT - ASSESSMENT
49 y/o male with PMH of kidney stones, HTN, lumbar disc herniation s/p discectomy in 2019, and venous stasis presents with c/o back pain     #L1-L3 disk herniation/Spinal Cord Compression  S/p Microdiscectomy 6/28   Dexamethasone taper 6mg q6 (6/30), 4mg q6 (7/1), 4mg q12 (7/2), 2mg q12 (7/13) and then d/c  Pain control Percocet 1 tab mild pain, 2 tab moderate pain, Dilaudid if needed  Robaxin 750 q6  Continue Lovenox 30mg qhs today, can increase to 40mg qhs on 7/1  Continue PT    #Left posterior tibial and right peroneal veins DVT  Could not be anticoagulated due to planned operation  S/p IVC filter 6/28 subcutaneous BID  O/p with Schor for IVC filter removal    #Constipation likely from opioid medication at home for back pain  Senna 2 tablets  Miralax 17 grams q/day  Encourage ambulation with PT    #HTN  Holding Lisinopril due to Low BP    #Diet: DASH    #Activity: As tolerated  #DVT Prophylaxis: Low dose Lovenox  #GI Prophylaxis: pantoprazole 40 mg q day  #Code Status: Full code   #Dispo: From home, no insurance for STR, pending repeat physiatry eval for 4A 51 y/o male with PMH of kidney stones, HTN, lumbar disc herniation s/p discectomy in 2019, and venous stasis presents with c/o back pain     #L1-L3 disk herniation/Spinal Cord Compression  S/p Microdiscectomy 6/28   Dexamethasone taper 6mg q6 (6/30), 4mg q6 (7/1), 4mg q12 (7/2), 2mg q12 (7/13) and then d/c  Pain control Percocet 1 tab mild pain, 2 tab moderate pain, Dilaudid if needed  Continue Robaxin 750 q6  Cleared by neurosurgery for DVT PPX  Continue PT    #Left posterior tibial and right peroneal veins DVT  Could not be anticoagulated due to planned operation  S/p IVC filter 6/28 subcutaneous BID  O/p with Schor for IVC filter removal    #Constipation likely from opioid medication   Senna 2 tablets  Miralax 17 grams q/day  Encourage ambulation with PT    #HTN  D/c Lisinopril due to Low BP  Start Amlodipine 5mg qd    #Diet: DASH    #Activity: As tolerated  #DVT Prophylaxis: Lovenox  #GI Prophylaxis: pantoprazole 40 mg q day  #Code Status: Full code   #Dispo: From home, pending placement

## 2020-07-01 NOTE — PROGRESS NOTE ADULT - SUBJECTIVE AND OBJECTIVE BOX
SUBJECTIVE:    Patient is a 50y old Male who presents with a chief complaint of back pain, new onset DVT (2020 15:11)    Currently admitted to medicine with the primary diagnosis of Back pain     Today is hospital day 5d. This morning he is resting comfortably in bed, reports he is unable to sleep well at night, no change in his lower extremity weakness    PAST MEDICAL & SURGICAL HISTORY  Lumbar disc herniation  Kidney stones  HTN (hypertension)  Herniated lumbar intervertebral disc: discectomy  Kidney stone: ureteroscopy  Thrombus: thrombectomy of left leg    SOCIAL HISTORY:  Negative for smoking/alcohol/drug use.     ALLERGIES:  No Known Allergies    MEDICATIONS:  STANDING MEDICATIONS  amLODIPine   Tablet 5 milliGRAM(s) Oral daily  chlorhexidine 4% Liquid 1 Application(s) Topical <User Schedule>  dexAMETHasone     Tablet 4 milliGRAM(s) Oral every 6 hours  enoxaparin Injectable 40 milliGRAM(s) SubCutaneous at bedtime  methocarbamol 750 milliGRAM(s) Oral every 6 hours  senna 2 Tablet(s) Oral at bedtime    PRN MEDICATIONS  ondansetron Injectable 4 milliGRAM(s) IV Push every 6 hours PRN  oxycodone    5 mG/acetaminophen 325 mG 1 Tablet(s) Oral every 6 hours PRN  oxycodone    5 mG/acetaminophen 325 mG 2 Tablet(s) Oral every 6 hours PRN  polyethylene glycol 3350 17 Gram(s) Oral daily PRN    VITALS:   T(F): 97  HR: 65  BP: 121/77  RR: 20  SpO2: 95%    LABS:            Urinalysis Basic - ( 2020 19:27 )    Color: Yellow / Appearance: Clear / S.030 / pH: x  Gluc: x / Ketone: Negative  / Bili: Negative / Urobili: 3 mg/dL   Blood: x / Protein: Trace / Nitrite: Negative   Leuk Esterase: Negative / RBC: x / WBC x   Sq Epi: x / Non Sq Epi: x / Bacteria: x                RADIOLOGY:    PHYSICAL EXAM:  GEN: No acute distress  LUNGS: Clear to auscultation bilaterally   HEART: S1/S2 present. RRR.   ABD: Soft, non-tender, non-distended. Bowel sounds present  EXT: NC/NC/NE/2+PP/TINEO  NEURO: 3/5 motor strength in LLE, 2/5 in RLE

## 2020-07-01 NOTE — PROGRESS NOTE ADULT - SUBJECTIVE AND OBJECTIVE BOX
POD # 3 S/P  L2-L3 microdiscectomy. Patient c/o minimal incisional pain.   Patient states LLE proximal movement and RLE distal movement are improved.  No new complaints.        Vital Signs Last 24 Hrs  T(C): 35.8 (2020 15:53), Max: 36.3 (2020 00:00)  T(F): 96.4 (2020 15:53), Max: 97.4 (2020 00:00)  HR: 68 (2020 15:53) (65 - 68)  BP: 137/81 (2020 15:53) (121/77 - 139/75)  BP(mean): --  RR: 19 (2020 15:53) (19 - 20)  SpO2: --    PHYSICAL EXAM:      GENERAL: NAD, well-groomed, well-developed  HEAD:  Atraumatic, normocephalic    WOUND: Dressing clean dry intact    MENTAL STATUS: AAO x3; Awake; Opens eyes spontaneously; Appropriately conversant without aphasia; following simple commands  CRANIAL NERVES: Visual acuity normal for age, visual fields full to confrontation, PERRL. EOMI without nystagmus. Facial sensation intact V1-3 distribution b/l. Face symmetric w/ normal eye closure and smile, tongue midline. Hearing grossly intact. Speech clear. Head turning and shoulder shrug intact.   REFLEXES: PERRL. Corneals intact b/l. Gag intact. Cough intact.  MOTOR:    MOTOR: strength 5/5 b/l upper extremities  Lowers      HF(L1/L2)     KE (L3)     DF (L4)     EHL (L5)     PF (S1)      R                     3/5              3-/5           3/5           4/5           3/5  L                     4-/5               4/5          2/5            2/5           2/5  SENSATION: grossly intact to light touch all extremities      ABDOMEN: Soft, nontender, nondistended;   EXTREMITIES:   notable b/l venous stasis  SKIN: Warm, dry      MEDICATIONS:  Antibiotics:    Neuro:  methocarbamol 750 milliGRAM(s) Oral every 6 hours  oxycodone    5 mG/acetaminophen 325 mG 1 Tablet(s) Oral every 6 hours PRN  oxycodone    5 mG/acetaminophen 325 mG 2 Tablet(s) Oral every 6 hours PRN    Anticoagulation:  enoxaparin Injectable 40 milliGRAM(s) SubCutaneous at bedtime    OTHER:  amLODIPine   Tablet 5 milliGRAM(s) Oral daily  chlorhexidine 4% Liquid 1 Application(s) Topical <User Schedule>  polyethylene glycol 3350 17 Gram(s) Oral daily PRN  senna 2 Tablet(s) Oral at bedtime    IVF:        LABS:            Urinalysis Basic - ( 2020 19:27 )    Color: Yellow / Appearance: Clear / S.030 / pH: x  Gluc: x / Ketone: Negative  / Bili: Negative / Urobili: 3 mg/dL   Blood: x / Protein: Trace / Nitrite: Negative   Leuk Esterase: Negative / RBC: x / WBC x   Sq Epi: x / Non Sq Epi: x / Bacteria: x

## 2020-07-01 NOTE — PROGRESS NOTE ADULT - SUBJECTIVE AND OBJECTIVE BOX
RAMIRO BETTENCOURT  50y  Male      Patient is a 50y old  Male who presents with a chief complaint of back pain, new onset DVT (2020 10:08)      INTERVAL HPI/OVERNIGHT EVENTS: says his strength is marginally improving, was able to get oobtc with 1 person rather than 2 person assist today. reports a bit of sob      REVIEW OF SYSTEMS:  as above  All other review of systems negative    T(C): 35.8 (20 @ 15:53), Max: 36.3 (20 @ 00:00)  HR: 68 (20 @ 15:53) (65 - 68)  BP: 137/81 (20 @ 15:53) (121/77 - 139/75)  RR: 19 (20 @ 15:53) (19 - 20)  SpO2: --  Wt(kg): --Vital Signs Last 24 Hrs  T(C): 35.8 (2020 15:53), Max: 36.3 (2020 00:00)  T(F): 96.4 (2020 15:53), Max: 97.4 (2020 00:00)  HR: 68 (2020 15:53) (65 - 68)  BP: 137/81 (2020 15:53) (121/77 - 139/75)  BP(mean): --  RR: 19 (2020 15:53) (19 - 20)  SpO2: --      06-30-20 @ 07:01  -  - @ 07:00  --------------------------------------------------------  IN: 1545 mL / OUT: 1800 mL / NET: -255 mL        PHYSICAL EXAM:  GENERAL: NAD  PSYCH: no agitation, baseline mentation  NERVOUS SYSTEM:  Alert & Oriented X3, b/l LE weakness R worse than L  PULMONARY: Clear to percussion bilaterally; No rales, rhonchi, wheezing, or rubs on RA  CARDIOVASCULAR: Regular rate and rhythm; No murmurs, rubs, or gallops  GI: Soft, Nontender, Nondistended; Bowel sounds present rotund  EXTREMITIES:  2+ Peripheral Pulses, No clubbing, cyanosis, or edema    Consultant(s) Notes Reviewed:  [x ] YES  [ ] NO    Discussed with Consultants/Other Providers [ x] YES     LABS              Urinalysis Basic - ( 2020 19:27 )    Color: Yellow / Appearance: Clear / S.030 / pH: x  Gluc: x / Ketone: Negative  / Bili: Negative / Urobili: 3 mg/dL   Blood: x / Protein: Trace / Nitrite: Negative   Leuk Esterase: Negative / RBC: x / WBC x   Sq Epi: x / Non Sq Epi: x / Bacteria: x        Lactate Trend        CAPILLARY BLOOD GLUCOSE            RADIOLOGY & ADDITIONAL TESTS:    Imaging Personally Reviewed:  [ ] YES  [ ] NO    HEALTH ISSUES - PROBLEM Dx:  DVT, lower extremity  Suspected pulmonary embolism

## 2020-07-01 NOTE — PROGRESS NOTE ADULT - ASSESSMENT
51 y/o male with PMH of kidney stones, HTN, lumbar disc herniation s/p discectomy in 2019, and venous stasis presents with c/o back pain for 6 months which has progressive worsened over the past month radiating to lower limbs (R>L), inability to ambulate due to leg weakness and constipation liked due to degenerative spine disease c cord compression associated with new onset DVT    #LE weakness 2/2 acute spinal cord compression   s/p L2-L3 discectomy   dexamethasone taper per neurosx recommendation  cont present pain mgmt   -PT     #s/p discectomy 6/28  -encourage incentive spirometry during post operative period  -bipap PRN post op   -maintain active T&S  -resume all home meds    #bilateral acute DVT  s/p IVC filter placement  monitor right groin   appreciated neurosx f/u, pod#3 increasing to dvt ppx dosing  if okay with neurosx team by pod#5 may use therapeutic AC dosing      #constipation  -cont with bowel regimen    #HTN  stable  -cont lisinopril     FULL code      Progress Note Handoff  Pending: steroid taper, PT,safe dispo plan   Patient/Family discussion: POC discussed with pt and daughter at bedside  Disposition: undocumented, needs skilled rehab, not a 4a candidate at current functional status, referred to complex care team; discussed with CM on the floor

## 2020-07-01 NOTE — PROGRESS NOTE ADULT - ASSESSMENT
51 yo male POD#3 s/p L2-3 Microdiscectomy      -  continue to taper dexamethasone starting 6/29 over 7 days    - Continue PT/rehab    - need B/L AFOs    - Monitor incision/dressing    - Robaxin 750 mg Q6 hours for spasm    - Pain control       case d/w Dr. Garcia

## 2020-07-02 ENCOUNTER — TRANSCRIPTION ENCOUNTER (OUTPATIENT)
Age: 50
End: 2020-07-02

## 2020-07-02 PROCEDURE — 99232 SBSQ HOSP IP/OBS MODERATE 35: CPT

## 2020-07-02 RX ORDER — ENOXAPARIN SODIUM 100 MG/ML
30 INJECTION SUBCUTANEOUS EVERY 12 HOURS
Refills: 0 | Status: DISCONTINUED | OUTPATIENT
Start: 2020-07-02 | End: 2020-07-07

## 2020-07-02 RX ORDER — DEXAMETHASONE 0.5 MG/5ML
2 ELIXIR ORAL EVERY 12 HOURS
Refills: 0 | Status: COMPLETED | OUTPATIENT
Start: 2020-07-03 | End: 2020-07-03

## 2020-07-02 RX ORDER — DEXAMETHASONE 0.5 MG/5ML
2 ELIXIR ORAL DAILY
Refills: 0 | Status: COMPLETED | OUTPATIENT
Start: 2020-07-04 | End: 2020-07-04

## 2020-07-02 RX ADMIN — METHOCARBAMOL 750 MILLIGRAM(S): 500 TABLET, FILM COATED ORAL at 23:33

## 2020-07-02 RX ADMIN — AMLODIPINE BESYLATE 5 MILLIGRAM(S): 2.5 TABLET ORAL at 05:04

## 2020-07-02 RX ADMIN — METHOCARBAMOL 750 MILLIGRAM(S): 500 TABLET, FILM COATED ORAL at 05:04

## 2020-07-02 RX ADMIN — OXYCODONE AND ACETAMINOPHEN 1 TABLET(S): 5; 325 TABLET ORAL at 22:03

## 2020-07-02 RX ADMIN — METHOCARBAMOL 750 MILLIGRAM(S): 500 TABLET, FILM COATED ORAL at 11:29

## 2020-07-02 RX ADMIN — METHOCARBAMOL 750 MILLIGRAM(S): 500 TABLET, FILM COATED ORAL at 17:39

## 2020-07-02 RX ADMIN — ENOXAPARIN SODIUM 30 MILLIGRAM(S): 100 INJECTION SUBCUTANEOUS at 17:38

## 2020-07-02 RX ADMIN — SENNA PLUS 2 TABLET(S): 8.6 TABLET ORAL at 22:04

## 2020-07-02 RX ADMIN — Medication 4 MILLIGRAM(S): at 17:38

## 2020-07-02 RX ADMIN — Medication 4 MILLIGRAM(S): at 05:04

## 2020-07-02 RX ADMIN — Medication 1 TABLET(S): at 17:39

## 2020-07-02 NOTE — DISCHARGE NOTE PROVIDER - CARE PROVIDER_API CALL
Wild Wu  SURGERY  [address] [place] [address]  Phone: [phone]  Fax: [phone]  Follow Up Time:     Pat Garcia)  [facility]  [address] [place] [address], [address]  [place] [address]  Phone: [phone]  Fax: [phone]  Follow Up Time:     Michael Hernández  INTERNAL MEDICINE  [address] [place] [address]  Phone: [phone]  Fax: [phone]  Follow Up Time: Wild Wu  SURGERY  [address] [place] [address]  Phone: [phone]  Fax: [phone]  Follow Up Time: 1 week    Pat Garcia)  [facility]  [address] [place] [address], [address]  [place] [address]  Phone: [phone]  Fax: [phone]  Follow Up Time: 1 week    Michael Hernández  INTERNAL MEDICINE  [address] [place] [address]  Phone: [phone]  Fax: [phone]  Follow Up Time: 1 week Wild Wu  SURGERY  [address] [place] [address]  Phone: [phone]  Fax: [phone]  Follow Up Time: 1 month    Pat Garcia)  [facility]  [address] [place] [address], [address]  [place] [address]  Phone: [phone]  Fax: [phone]  Follow Up Time: 1 week    Michael Hernández  INTERNAL MEDICINE  [address] [place] [address]  Phone: [phone]  Fax: [phone]  Follow Up Time: 1 week

## 2020-07-02 NOTE — DIETITIAN INITIAL EVALUATION ADULT. - ENERGY NEEDS
Calories: 9388-3059 kcal/day (30-35 kcal/kg IBW)--increased needs d/t severe PCM   Protein: 71-89 gm/day (1.2-1.5 gm/kg IBW)--same as above   Fluid: 1 ml/kcal or per LIP

## 2020-07-02 NOTE — DISCHARGE NOTE PROVIDER - NSDCMRMEDTOKEN_GEN_ALL_CORE_FT
Flomax 0.4 mg oral capsule: 1 cap(s) orally once a day   Flomax 0.4 mg oral capsule: 1 cap(s) orally once a day amLODIPine 5 mg oral tablet: 1 tab(s) orally once a day  enoxaparin: 30 milligram(s) subcutaneous 2 times a day, switch to full dose anticoagulation on 7/25/2020  Flomax 0.4 mg oral capsule: 1 cap(s) orally once a day   methocarbamol 750 mg oral tablet: 1 tab(s) orally every 6 hours, stop after 10 days  Multiple Vitamins oral tablet: 1 tab(s) orally once a day  polyethylene glycol 3350 oral powder for reconstitution: 17 gram(s) orally once a day, As needed, Constipation  senna oral tablet: 2 tab(s) orally once a day (at bedtime) amLODIPine 5 mg oral tablet: 1 tab(s) orally once a day  Eliquis Starter Pack for Treatment of DVT and PE 5 mg oral tablet: 1 tab(s) orally 2 times a day, start on 7/15/2020  enoxaparin: 30 milligram(s) subcutaneous 2 times a day, ends 7/15/2020  Flomax 0.4 mg oral capsule: 1 cap(s) orally once a day   methocarbamol 750 mg oral tablet: 1 tab(s) orally every 6 hours, stop after 10 days  Multiple Vitamins oral tablet: 1 tab(s) orally once a day  polyethylene glycol 3350 oral powder for reconstitution: 17 gram(s) orally once a day, As needed, Constipation  senna oral tablet: 2 tab(s) orally once a day (at bedtime) amLODIPine 5 mg oral tablet: 1 tab(s) orally once a day  Eliquis Starter Pack for Treatment of DVT and PE 5 mg oral tablet: 1 tab(s) orally 2 times a day, start on 7/15/2020  enoxaparin: 30 milligram(s) subcutaneous 2 times a day, ends 7/15/2020  Flomax 0.4 mg oral capsule: 1 cap(s) orally once a day   methocarbamol 750 mg oral tablet: 1 tab(s) orally every 6 hours, stop after 10 days  Multiple Vitamins oral tablet: 1 tab(s) orally once a day  oxycodone-acetaminophen 5 mg-325 mg oral tablet: 1 tab(s) orally every 6 hours, As needed, Severe Pain (7 - 10)  polyethylene glycol 3350 oral powder for reconstitution: 17 gram(s) orally once a day, As needed, Constipation  senna oral tablet: 2 tab(s) orally once a day (at bedtime)  traMADol 50 mg oral tablet: 0.5 tab(s) orally every 8 hours, As needed, Moderate Pain (4 - 6) amLODIPine 5 mg oral tablet: 1 tab(s) orally once a day  Eliquis 5 mg oral tablet: 2 tab(s) orally every 12 hours (end date 7/14)  Eliquis Starter Pack for Treatment of DVT and PE 5 mg oral tablet: 1 tab(s) orally 2 times a day, start on 7/15/2020  Flomax 0.4 mg oral capsule: 1 cap(s) orally once a day   methocarbamol 750 mg oral tablet: 1 tab(s) orally every 6 hours, stop after 10 days  Multiple Vitamins oral tablet: 1 tab(s) orally once a day  oxycodone-acetaminophen 5 mg-325 mg oral tablet: 1 tab(s) orally every 6 hours, As needed, Severe Pain (7 - 10)  polyethylene glycol 3350 oral powder for reconstitution: 17 gram(s) orally once a day, As needed, Constipation  Protonix 40 mg oral delayed release tablet: 1 tab(s) orally once a day (before a meal)  senna oral tablet: 2 tab(s) orally once a day (at bedtime)  traMADol 50 mg oral tablet: 0.5 tab(s) orally every 8 hours, As needed, Moderate Pain (4 - 6)

## 2020-07-02 NOTE — DISCHARGE NOTE PROVIDER - HOSPITAL COURSE
49 y/o male with PMH of kidney stones, HTN, lumbar disc herniation s/p discectomy in 2019, and venous stasis presents with c/o back pain for 6 months which has progressive worsened over the past month radiating to lower limbs (R>L), inability to ambulate due to leg weakness and constipation. Patient has been in [place] for past 4 months and had an X-ray done there which was nornal and was prescribed pain medications. Patient returned last night from [place] and came to the ED.    In ED, vital signs were 97.2 f, /94 mm hg, HR 84 bpm, RR 17 and 100% o2 on RA. Patient reports b/l lower extremity weakness associated with burning sensation radiating to b/l extremities (R>L). In the ED, preliminary doppler was done and positive for left femoral DVT likely from being bedridden for a month at home due to pain. No hx IVDA. Denies diarrhea, urinary sxs, fever, chills, chest pain, dyspnea, no saddle anesthesia, bowel/bladder incontinence/retention.         Patient had MRI done that showed large L2-3 disc herniation causing almost complete obliteration of the canal. Patient underwent L2-3 Microdiscectomy with neurosurgery.    On admission patient was complaining of worsening lower extremity edema, duplex of LE showed right peroneal and left PT DVT. Because he needed urgent procedure and could not be anticoagulated, an IVC filter was placed. Patient told to follow-up with vascular Dr. Wu for its removal Patient is a 50y old man who presented with a complaint of back pain which had been persistent for months with radiation to the lower extremities, R>L. In hospital neurosurgery performed microdiscectomy 6/28 due to MRI showing L2-3 disc herniation. Patient also has history of DVT in bilateral legs for which he received IVC filter. Patient full anticoagulation resumed. Patient requires follow-up with Dr. Wu for IVC filter removal.         Patient clinically stable with improving weakness present in bilateral legs, R>L. Still requires recooperation; will be discharged to rehab.

## 2020-07-02 NOTE — DISCHARGE NOTE PROVIDER - CARE PROVIDERS DIRECT ADDRESSES
[email or web address]

## 2020-07-02 NOTE — DISCHARGE NOTE PROVIDER - NSDCCPCAREPLAN_GEN_ALL_CORE_FT
PRINCIPAL DISCHARGE DIAGNOSIS  Diagnosis: Lumbar disc herniation  Assessment and Plan of Treatment: You underwent a procedure called Micodiscectomy to help allieviate the pressure on the nerves in your back, you need to continue physical therapy to help regain strength in your legs and relieve back pain.   Follow-up with neurosurgergy Dr. Garcia      SECONDARY DISCHARGE DIAGNOSES  Diagnosis: Hypertension  Assessment and Plan of Treatment: The medication you were one Lisinopril was causing your blood potassium levels to rise, so we changed it to Amildopine (Norvasc), continue taking this medication, monitor your blood pressure, and follow-up with your primary care provider    Diagnosis: DVT (deep venous thrombosis)  Assessment and Plan of Treatment: We found that you have a blood clot in the veins in your legs, we could not give you blood thinners because you needed a surgical procedure, we inserted an Inferior Vena Cava filter, which's a device that attempts to catch the blood clots and prevent them from travelling to your lungs, this device needs to be removed, follow-up with Dr. Wu upon discharge    Diagnosis: Ambulatory dysfunction  Assessment and Plan of Treatment:     Diagnosis: Paresthesias  Assessment and Plan of Treatment: PRINCIPAL DISCHARGE DIAGNOSIS  Diagnosis: Lumbar disc herniation  Assessment and Plan of Treatment: You underwent a procedure called Micodiscectomy to help allieviate the pressure on the nerves in your back, you need to continue physical therapy to help regain strength in your legs and relieve back pain.   Follow-up with neurosurgergy Dr. Garcia      SECONDARY DISCHARGE DIAGNOSES  Diagnosis: Hypertension  Assessment and Plan of Treatment: The medication you were one Lisinopril was causing your blood potassium levels to rise, so we changed it to Amildopine (Norvasc), continue taking this medication, monitor your blood pressure, and follow-up with your primary care provider    Diagnosis: DVT (deep venous thrombosis)  Assessment and Plan of Treatment: We found that you have a blood clot in the veins in your legs, we could not give you blood thinners because you needed a surgical procedure, we inserted an Inferior Vena Cava filter, which's a device that attempts to catch the blood clots and prevent them from travelling to your lungs, this device needs to be removed, follow-up with Dr. Wu upon discharge.  You are discharged on Lovenox injections 2 times a day, on 7/15/2020, you need to start full dose anticoagulation to help dissolve the clots and prevent others from forming    Diagnosis: Ambulatory dysfunction  Assessment and Plan of Treatment:     Diagnosis: Paresthesias  Assessment and Plan of Treatment: PRINCIPAL DISCHARGE DIAGNOSIS  Diagnosis: Lumbar disc herniation  Assessment and Plan of Treatment: You underwent a procedure called Micodiscectomy to help allieviate the pressure on the nerves in your back, you need to continue physical therapy to help regain strength in your legs and relieve back pain.   Follow-up with neurosurgergy Dr. Garcia      SECONDARY DISCHARGE DIAGNOSES  Diagnosis: Hypertension  Assessment and Plan of Treatment: The medication you were one Lisinopril was causing your blood potassium levels to rise, so we changed it to Amildopine (Norvasc), continue taking this medication, monitor your blood pressure, and follow-up with your primary care provider    Diagnosis: DVT (deep venous thrombosis)  Assessment and Plan of Treatment: We found that you have a blood clot in the veins in your legs, we could not give you blood thinners because you needed a surgical procedure, we inserted an Inferior Vena Cava filter, which is a device that attempts to catch the blood clots and prevent them from travelling to your lungs, this device needs to be removed, follow-up with Dr. Wu upon discharge.  You are discharged on apixaban 10mg 2x a day, please continue to take this medication and follow-up with your primary care provider    Diagnosis: Ambulatory dysfunction  Assessment and Plan of Treatment:     Diagnosis: Paresthesias  Assessment and Plan of Treatment:

## 2020-07-02 NOTE — DIETITIAN INITIAL EVALUATION ADULT. - PHYSICAL APPEARANCE
alert and oriented. BMI: 41.6, IBW: 130#, UBW: ~270# (last weighed last month), 1+ edema to B/L foot, surgical incision. Pt doesn't display any signs of muscle/fat loss upon NFPE./obese

## 2020-07-02 NOTE — PROGRESS NOTE ADULT - SUBJECTIVE AND OBJECTIVE BOX
RAMIRO BETTENCOURT  50y  Male      Patient is a 50y old  Male who presents with a chief complaint of back pain, new onset DVT (2020 14:11)      INTERVAL HPI/OVERNIGHT EVENTS: seen in am, still having b/l LE weakness r worse than left. moving R toes more. said he will attempt oobtc with help again in the afternoon      REVIEW OF SYSTEMS:  as above  All other review of systems negative    T(C): 35.8 (20 @ 15:24), Max: 36.2 (20 @ 23:36)  HR: 57 (20 @ 15:24) (55 - 61)  BP: 143/71 (20 @ 15:24) (105/58 - 143/71)  RR: 19 (20 @ 15:24) (18 - 19)  SpO2: --  Wt(kg): --Vital Signs Last 24 Hrs  T(C): 35.8 (2020 15:24), Max: 36.2 (2020 23:36)  T(F): 96.4 (2020 15:24), Max: 97.2 (2020 23:36)  HR: 57 (2020 15:24) (55 - 61)  BP: 143/71 (2020 15:24) (105/58 - 143/71)  BP(mean): --  RR: 19 (2020 15:24) (18 - 19)  SpO2: --      20 @ 07:01  -  20 @ 17:04  --------------------------------------------------------  IN: 0 mL / OUT: 500 mL / NET: -500 mL        PHYSICAL EXAM:  GENERAL: deconditioned  PSYCH: no agitation, baseline mentation  NERVOUS SYSTEM:  Alert & Oriented X3, no new focal deficits, RLE 2/5 str, LLE 3/5 str as before  PULMONARY: Clear to percussion bilaterally; No rales, rhonchi, wheezing, or rubs  CARDIOVASCULAR: Regular rate and rhythm; No murmurs, rubs, or gallops  GI: Soft, Nontender, Nondistended; Bowel sounds present, rotund  EXTREMITIES:  2+ Peripheral Pulses, No clubbing, cyanosis, or edema    Consultant(s) Notes Reviewed:  [x ] YES  [ ] NO    Discussed with Consultants/Other Providers [ x] YES     LABS              Urinalysis Basic - ( 2020 19:27 )    Color: Yellow / Appearance: Clear / S.030 / pH: x  Gluc: x / Ketone: Negative  / Bili: Negative / Urobili: 3 mg/dL   Blood: x / Protein: Trace / Nitrite: Negative   Leuk Esterase: Negative / RBC: x / WBC x   Sq Epi: x / Non Sq Epi: x / Bacteria: x        Lactate Trend        CAPILLARY BLOOD GLUCOSE            RADIOLOGY & ADDITIONAL TESTS:    Imaging Personally Reviewed:  [ ] YES  [ ] NO    HEALTH ISSUES - PROBLEM Dx:  DVT, lower extremity

## 2020-07-02 NOTE — DISCHARGE NOTE PROVIDER - PROVIDER TOKENS
PROVIDER:[TOKEN:[[id number]]],PROVIDER:[TOKEN:[[id number]]],PROVIDER:[TOKEN:[[id number]]] PROVIDER:[TOKEN:[[id number]],FOLLOWUP:[1 week]],PROVIDER:[TOKEN:[[id number]],FOLLOWUP:[1 week]],PROVIDER:[TOKEN:[[id number]],FOLLOWUP:[1 week]] PROVIDER:[TOKEN:[[id number]],FOLLOWUP:[1 month]],PROVIDER:[TOKEN:[[id number]],FOLLOWUP:[1 week]],PROVIDER:[TOKEN:[[id number]],FOLLOWUP:[1 week]]

## 2020-07-02 NOTE — PROGRESS NOTE ADULT - ASSESSMENT
49 y/o male with PMH of kidney stones, HTN, lumbar disc herniation s/p discectomy in 2019, and venous stasis presents with c/o back pain for 6 months which has progressive worsened over the past month radiating to lower limbs (R>L), inability to ambulate due to leg weakness and constipation liked due to degenerative spine disease c cord compression associated with new onset DVT    #LE weakness 2/2 acute spinal cord compression   s/p L2-L3 discectomy   dexamethasone taper per neurosx recommendation  cont present pain mgmt   -PT     #s/p discectomy 6/28  -encourage incentive spirometry during post operative period  -bipap PRN post op   -maintain active T&S  -resumed all home meds    #bilateral acute DVT  s/p IVC filter placement  monitor right groin   increasing ac      #constipation  -cont with bowel regimen    #HTN  stable  -cont lisinopril     FULL code      Progress Note Handoff  Pending: steroid taper, PT,safe dispo plan   Patient/Family discussion: POC discussed with pt and daughter at bedside  Disposition: undocumented, needs skilled rehab, not a 4a candidate at current functional status, referred to complex care team; discussed with CM on the floor; possible snf tomorrow?

## 2020-07-02 NOTE — PROGRESS NOTE ADULT - ASSESSMENT
51 y/o male with PMH of kidney stones, HTN, lumbar disc herniation s/p discectomy in 2019, and venous stasis presents with c/o back pain     #L1-L3 disk herniation/Spinal Cord Compression  S/p Microdiscectomy 6/28, post-op day 4   Dexamethasone taper 4mg q12 (7/2), 2mg q12 (7/3), 2mg qd (7/4) and then d/c  Pain control Percocet 1 tab mild pain, 2 tab moderate pain  Continue Robaxin 750 q6  Cleared by neurosurgery for DVT PPX  Will increase Lovenox to 30mg BID  On POD #5 can start full dose anticoagulation   Continue PT    #Left posterior tibial and right peroneal veins DVT  Could not be anticoagulated due to planned operation  S/p IVC filter 6/28 subcutaneous BID  O/p with Schor for IVC filter removal    #Constipation likely from opioid medication   Senna 2 tablets  Miralax 17 grams q/day  Encourage ambulation with PT    #HTN  D/c Lisinopril due to Low BP  Start Amlodipine 5mg qd    #Hx of episodes of shortness of breath while sleeping  Likely BRISA  Needs outpatient sleep study    #Diet: DASH    #Activity: As tolerated  #DVT Prophylaxis: Lovenox 30mg BID  #GI Prophylaxis: pantoprazole 40 mg q day  #Code Status: Full code   #Dispo: From home, pending placement

## 2020-07-02 NOTE — PROGRESS NOTE ADULT - SUBJECTIVE AND OBJECTIVE BOX
SUBJECTIVE:    Patient is a 50y old Male who presents with a chief complaint of back pain, new onset DVT (2020 18:22)    Currently admitted to medicine with the primary diagnosis of Back pain     Today is hospital day 6d. This morning he reports improvement in leg weakness, able to move right toes.    PAST MEDICAL & SURGICAL HISTORY  Lumbar disc herniation  Kidney stones  HTN (hypertension)  Herniated lumbar intervertebral disc: discectomy  Kidney stone: ureteroscopy  Thrombus: thrombectomy of left leg    SOCIAL HISTORY:  Negative for smoking/alcohol/drug use.     ALLERGIES:  No Known Allergies    MEDICATIONS:  STANDING MEDICATIONS  amLODIPine   Tablet 5 milliGRAM(s) Oral daily  chlorhexidine 4% Liquid 1 Application(s) Topical <User Schedule>  dexAMETHasone     Tablet 4 milliGRAM(s) Oral every 12 hours  enoxaparin Injectable 30 milliGRAM(s) SubCutaneous every 12 hours  methocarbamol 750 milliGRAM(s) Oral every 6 hours  senna 2 Tablet(s) Oral at bedtime    PRN MEDICATIONS  oxycodone    5 mG/acetaminophen 325 mG 1 Tablet(s) Oral every 6 hours PRN  oxycodone    5 mG/acetaminophen 325 mG 2 Tablet(s) Oral every 6 hours PRN  polyethylene glycol 3350 17 Gram(s) Oral daily PRN    VITALS:   T(F): 96.3  HR: 55  BP: 105/58  RR: 19  SpO2: --    LABS:            Urinalysis Basic - ( 2020 19:27 )    Color: Yellow / Appearance: Clear / S.030 / pH: x  Gluc: x / Ketone: Negative  / Bili: Negative / Urobili: 3 mg/dL   Blood: x / Protein: Trace / Nitrite: Negative   Leuk Esterase: Negative / RBC: x / WBC x   Sq Epi: x / Non Sq Epi: x / Bacteria: x                RADIOLOGY:    PHYSICAL EXAM:  GEN: No acute distress  LUNGS: Clear to auscultation bilaterally   HEART: S1/S2 present. RRR.   ABD: Soft, non-tender, non-distended. Bowel sounds present  EXT: NC/NC/NE/2+PP/TINEO  NEURO: AAOX3, can bend left leg at knee, can wiggle right toes

## 2020-07-02 NOTE — DIETITIAN INITIAL EVALUATION ADULT. - OTHER INFO
Nutrition Hx PTA: Pt states that he typically has an excellent appetite/po intake, however significantly decreased over the last month 2/2 worsening back pain, which radiated to lower limbs causing inability to ambulate due to leg weakness. Pt was mostly bed-bound and was not eating much of anything at all. Denied use of oral nutrition supplements and has no cultural/Temple restrictions, but is allergic to shrimp. Pt follows a strict low fat and sodium diet at home and declined need for additional diet ed/dsc materials.    Pt p/w back pain. L1-L3 disk herniation/Spinal Cord Compression s/p microdiscectomy 6/28. Left posterior tibial and right peroneal veins DVT s/p IVC filter 6/28 subcutaneous BID. Constipation likely from opioid medication. Encourage ambulation with PT.

## 2020-07-02 NOTE — CHART NOTE - NSCHARTNOTEFT_GEN_A_CORE
Upon Nutritional Assessment by the Registered Dietitian your patient was determined to meet criteria / has evidence of the following diagnosis/diagnoses:          [ ]  Mild Protein Calorie Malnutrition        [ ]  Moderate Protein Calorie Malnutrition        [x] Severe Protein Calorie Malnutrition        [ ] Unspecified Protein Calorie Malnutrition        [ ] Underweight / BMI <19        [x] Morbid Obesity / BMI > 40    Findings as based on:  •  Comprehensive nutrition assessment and consultation  •  Nutrition history as obtained from the patient     Severe Protein-Calorie Malnutrition Indicators:  1. Weight loss of 27#, ~10% of body weight over 1 month  2. <50% of energy needs met for >5 days     Morbid Obesity Indicator:  BMI 41.6 [64"/243#]    Treatment:    The following diet has been recommended:  1. Continue current diet order   2. Consider daily MVI/minerals    PROVIDER Section:     By signing this assessment you are acknowledging and agree with the diagnosis/diagnoses assigned by the Registered Dietitian    Comments:

## 2020-07-02 NOTE — DIETITIAN INITIAL EVALUATION ADULT. - MALNUTRITION
Severe Protein-Calorie Malnutrition in the context of acute illness/injury related to worsening back pain as evidenced by wt loss of 27#, ~10% of body wt over 1 month, <50% of energy needs met for >5 days

## 2020-07-03 LAB — SARS-COV-2 RNA SPEC QL NAA+PROBE: SIGNIFICANT CHANGE UP

## 2020-07-03 PROCEDURE — 99233 SBSQ HOSP IP/OBS HIGH 50: CPT

## 2020-07-03 RX ADMIN — CHLORHEXIDINE GLUCONATE 1 APPLICATION(S): 213 SOLUTION TOPICAL at 05:18

## 2020-07-03 RX ADMIN — AMLODIPINE BESYLATE 5 MILLIGRAM(S): 2.5 TABLET ORAL at 05:15

## 2020-07-03 RX ADMIN — METHOCARBAMOL 750 MILLIGRAM(S): 500 TABLET, FILM COATED ORAL at 23:30

## 2020-07-03 RX ADMIN — METHOCARBAMOL 750 MILLIGRAM(S): 500 TABLET, FILM COATED ORAL at 18:43

## 2020-07-03 RX ADMIN — ENOXAPARIN SODIUM 30 MILLIGRAM(S): 100 INJECTION SUBCUTANEOUS at 18:44

## 2020-07-03 RX ADMIN — Medication 2 MILLIGRAM(S): at 05:15

## 2020-07-03 RX ADMIN — ENOXAPARIN SODIUM 30 MILLIGRAM(S): 100 INJECTION SUBCUTANEOUS at 05:15

## 2020-07-03 RX ADMIN — Medication 2 MILLIGRAM(S): at 18:43

## 2020-07-03 RX ADMIN — METHOCARBAMOL 750 MILLIGRAM(S): 500 TABLET, FILM COATED ORAL at 11:13

## 2020-07-03 RX ADMIN — METHOCARBAMOL 750 MILLIGRAM(S): 500 TABLET, FILM COATED ORAL at 05:15

## 2020-07-03 RX ADMIN — Medication 1 TABLET(S): at 11:13

## 2020-07-03 NOTE — PROGRESS NOTE ADULT - SUBJECTIVE AND OBJECTIVE BOX
RAMIRO BETTENCOURT  50y  Male      Patient is a 50y old  Male who presents with a chief complaint of back pain, new onset DVT (02 Jul 2020 17:03)      INTERVAL HPI/OVERNIGHT EVENTS: feels some strength returning now to the RLE. able to lift the knee off the bed now while engaging his quad. otherwise no new complaints      REVIEW OF SYSTEMS:  as above  All other review of systems negative    T(C): 35.6 (07-03-20 @ 07:10), Max: 36.4 (07-03-20 @ 00:11)  HR: 54 (07-03-20 @ 07:10) (54 - 64)  BP: 140/78 (07-03-20 @ 07:10) (126/60 - 143/71)  RR: 18 (07-03-20 @ 07:10) (18 - 19)  SpO2: --  Wt(kg): --Vital Signs Last 24 Hrs  T(C): 35.6 (03 Jul 2020 07:10), Max: 36.4 (03 Jul 2020 00:11)  T(F): 96 (03 Jul 2020 07:10), Max: 97.5 (03 Jul 2020 00:11)  HR: 54 (03 Jul 2020 07:10) (54 - 64)  BP: 140/78 (03 Jul 2020 07:10) (126/60 - 143/71)  BP(mean): --  RR: 18 (03 Jul 2020 07:10) (18 - 19)  SpO2: --      07-02-20 @ 07:01  -  07-03-20 @ 07:00  --------------------------------------------------------  IN: 0 mL / OUT: 1250 mL / NET: -1250 mL        PHYSICAL EXAM:  GENERAL: deconditioned  PSYCH: no agitation, baseline mentation  NERVOUS SYSTEM:  Alert & Oriented X3, no new focal deficits R>L weakness, slowly improving  PULMONARY: Clear to percussion bilaterally; No rales, rhonchi, wheezing, or rubs  CARDIOVASCULAR: Regular rate and rhythm; No murmurs, rubs, or gallops  GI: Soft, Nontender, Nondistended; Bowel sounds present rotund  EXTREMITIES:  2+ Peripheral Pulses, No clubbing, cyanosis, or edema    Consultant(s) Notes Reviewed:  [x ] YES  [ ] NO    Discussed with Consultants/Other Providers [ x] YES     LABS                  Lactate Trend        CAPILLARY BLOOD GLUCOSE            RADIOLOGY & ADDITIONAL TESTS:    Imaging Personally Reviewed:  [ ] YES  [ ] NO    HEALTH ISSUES - PROBLEM Dx:  DVT, lower extremity

## 2020-07-03 NOTE — PROGRESS NOTE ADULT - ASSESSMENT
49 y/o male with PMH of kidney stones, HTN, lumbar disc herniation s/p discectomy in 2019, and venous stasis presents with c/o back pain for 6 months which has progressive worsened over the past month radiating to lower limbs (R>L), inability to ambulate due to leg weakness and constipation liked due to degenerative spine disease c cord compression associated with new onset DVT    #LE weakness 2/2 acute spinal cord compression   s/p L2-L3 discectomy   dexamethasone taper per neurosx recommendation  cont present pain mgmt   -PT   strength in lower extremities marginally improving    #s/p discectomy 6/28  -encourage incentive spirometry during post operative period  -bipap PRN post op   -maintain active T&S  -resumed all home meds    #bilateral acute DVT  s/p IVC filter placement  monitor right groin   increased ac-> will transition to full dose therapeutic AC next week with a NOAC in rehab       #constipation  -cont with bowel regimen    #HTN  stable  -cont lisinopril     FULL code      Progress Note Handoff  Pending: steroid taper, increase to full AC next week, SNF when available  Patient/Family discussion: POC discussed with pt and previously daughter at bedside  Disposition: SNF but undocumented, complex/care team working on it

## 2020-07-04 LAB
ALBUMIN SERPL ELPH-MCNC: 3.9 G/DL — SIGNIFICANT CHANGE UP (ref 3.5–5.2)
ALP SERPL-CCNC: 92 U/L — SIGNIFICANT CHANGE UP (ref 30–115)
ALT FLD-CCNC: 44 U/L — HIGH (ref 0–41)
ANION GAP SERPL CALC-SCNC: 11 MMOL/L — SIGNIFICANT CHANGE UP (ref 7–14)
ANISOCYTOSIS BLD QL: SLIGHT — SIGNIFICANT CHANGE UP
AST SERPL-CCNC: 14 U/L — SIGNIFICANT CHANGE UP (ref 0–41)
BASOPHILS # BLD AUTO: 0 K/UL — SIGNIFICANT CHANGE UP (ref 0–0.2)
BASOPHILS NFR BLD AUTO: 0 % — SIGNIFICANT CHANGE UP (ref 0–1)
BILIRUB SERPL-MCNC: 0.3 MG/DL — SIGNIFICANT CHANGE UP (ref 0.2–1.2)
BUN SERPL-MCNC: 20 MG/DL — SIGNIFICANT CHANGE UP (ref 10–20)
CALCIUM SERPL-MCNC: 9.1 MG/DL — SIGNIFICANT CHANGE UP (ref 8.5–10.1)
CHLORIDE SERPL-SCNC: 98 MMOL/L — SIGNIFICANT CHANGE UP (ref 98–110)
CO2 SERPL-SCNC: 27 MMOL/L — SIGNIFICANT CHANGE UP (ref 17–32)
CREAT SERPL-MCNC: 0.6 MG/DL — LOW (ref 0.7–1.5)
EOSINOPHIL # BLD AUTO: 0.13 K/UL — SIGNIFICANT CHANGE UP (ref 0–0.7)
EOSINOPHIL NFR BLD AUTO: 0.9 % — SIGNIFICANT CHANGE UP (ref 0–8)
GLUCOSE SERPL-MCNC: 236 MG/DL — HIGH (ref 70–99)
HCT VFR BLD CALC: 42.9 % — SIGNIFICANT CHANGE UP (ref 42–52)
HGB BLD-MCNC: 14.5 G/DL — SIGNIFICANT CHANGE UP (ref 14–18)
LYMPHOCYTES # BLD AUTO: 22.6 % — SIGNIFICANT CHANGE UP (ref 20.5–51.1)
LYMPHOCYTES # BLD AUTO: 3.2 K/UL — SIGNIFICANT CHANGE UP (ref 1.2–3.4)
MAGNESIUM SERPL-MCNC: 1.9 MG/DL — SIGNIFICANT CHANGE UP (ref 1.8–2.4)
MANUAL SMEAR VERIFICATION: SIGNIFICANT CHANGE UP
MCHC RBC-ENTMCNC: 28.8 PG — SIGNIFICANT CHANGE UP (ref 27–31)
MCHC RBC-ENTMCNC: 33.8 G/DL — SIGNIFICANT CHANGE UP (ref 32–37)
MCV RBC AUTO: 85.3 FL — SIGNIFICANT CHANGE UP (ref 80–94)
MONOCYTES # BLD AUTO: 0.74 K/UL — HIGH (ref 0.1–0.6)
MONOCYTES NFR BLD AUTO: 5.2 % — SIGNIFICANT CHANGE UP (ref 1.7–9.3)
NEUTROPHILS # BLD AUTO: 9.71 K/UL — HIGH (ref 1.4–6.5)
NEUTROPHILS NFR BLD AUTO: 68.7 % — SIGNIFICANT CHANGE UP (ref 42.2–75.2)
PLAT MORPH BLD: NORMAL — SIGNIFICANT CHANGE UP
PLATELET # BLD AUTO: 232 K/UL — SIGNIFICANT CHANGE UP (ref 130–400)
POLYCHROMASIA BLD QL SMEAR: SLIGHT — SIGNIFICANT CHANGE UP
POTASSIUM SERPL-MCNC: 4.8 MMOL/L — SIGNIFICANT CHANGE UP (ref 3.5–5)
POTASSIUM SERPL-SCNC: 4.8 MMOL/L — SIGNIFICANT CHANGE UP (ref 3.5–5)
PROT SERPL-MCNC: 6.5 G/DL — SIGNIFICANT CHANGE UP (ref 6–8)
RBC # BLD: 5.03 M/UL — SIGNIFICANT CHANGE UP (ref 4.7–6.1)
RBC # FLD: 13.9 % — SIGNIFICANT CHANGE UP (ref 11.5–14.5)
RBC BLD AUTO: NORMAL — SIGNIFICANT CHANGE UP
SODIUM SERPL-SCNC: 136 MMOL/L — SIGNIFICANT CHANGE UP (ref 135–146)
VARIANT LYMPHS # BLD: 2.6 % — SIGNIFICANT CHANGE UP (ref 0–5)
WBC # BLD: 14.14 K/UL — HIGH (ref 4.8–10.8)
WBC # FLD AUTO: 14.14 K/UL — HIGH (ref 4.8–10.8)

## 2020-07-04 PROCEDURE — 99232 SBSQ HOSP IP/OBS MODERATE 35: CPT

## 2020-07-04 RX ORDER — SENNA PLUS 8.6 MG/1
2 TABLET ORAL
Qty: 0 | Refills: 0 | DISCHARGE
Start: 2020-07-04

## 2020-07-04 RX ORDER — POLYETHYLENE GLYCOL 3350 17 G/17G
17 POWDER, FOR SOLUTION ORAL
Qty: 0 | Refills: 0 | DISCHARGE
Start: 2020-07-04

## 2020-07-04 RX ORDER — AMLODIPINE BESYLATE 2.5 MG/1
1 TABLET ORAL
Qty: 0 | Refills: 0 | DISCHARGE
Start: 2020-07-04

## 2020-07-04 RX ORDER — ENOXAPARIN SODIUM 100 MG/ML
30 INJECTION SUBCUTANEOUS
Qty: 0 | Refills: 0 | DISCHARGE
Start: 2020-07-04

## 2020-07-04 RX ORDER — METHOCARBAMOL 500 MG/1
1 TABLET, FILM COATED ORAL
Qty: 0 | Refills: 0 | DISCHARGE
Start: 2020-07-04

## 2020-07-04 RX ADMIN — SENNA PLUS 2 TABLET(S): 8.6 TABLET ORAL at 21:54

## 2020-07-04 RX ADMIN — ENOXAPARIN SODIUM 30 MILLIGRAM(S): 100 INJECTION SUBCUTANEOUS at 17:42

## 2020-07-04 RX ADMIN — Medication 1 TABLET(S): at 12:00

## 2020-07-04 RX ADMIN — ENOXAPARIN SODIUM 30 MILLIGRAM(S): 100 INJECTION SUBCUTANEOUS at 05:47

## 2020-07-04 RX ADMIN — METHOCARBAMOL 750 MILLIGRAM(S): 500 TABLET, FILM COATED ORAL at 23:29

## 2020-07-04 RX ADMIN — AMLODIPINE BESYLATE 5 MILLIGRAM(S): 2.5 TABLET ORAL at 05:48

## 2020-07-04 RX ADMIN — METHOCARBAMOL 750 MILLIGRAM(S): 500 TABLET, FILM COATED ORAL at 05:47

## 2020-07-04 RX ADMIN — METHOCARBAMOL 750 MILLIGRAM(S): 500 TABLET, FILM COATED ORAL at 17:42

## 2020-07-04 RX ADMIN — METHOCARBAMOL 750 MILLIGRAM(S): 500 TABLET, FILM COATED ORAL at 12:01

## 2020-07-04 RX ADMIN — Medication 2 MILLIGRAM(S): at 05:47

## 2020-07-04 RX ADMIN — CHLORHEXIDINE GLUCONATE 1 APPLICATION(S): 213 SOLUTION TOPICAL at 05:48

## 2020-07-04 NOTE — PROGRESS NOTE ADULT - ASSESSMENT
51 y/o male with PMH of kidney stones, HTN, lumbar disc herniation s/p discectomy in 2019, and venous stasis presents with c/o back pain for 6 months which has progressive worsened over the past month radiating to lower limbs (R>L), inability to ambulate due to leg weakness and constipation liked due to degenerative spine disease c cord compression associated with new onset DVT    #LE weakness 2/2 acute spinal cord compression   s/p L2-L3 discectomy   completed dexamethasone taper per neurosx recommendation  cont present pain mgmt   -PT   strength in lower extremities marginally improving daily    #s/p discectomy 6/28  -encourage incentive spirometry during post operative period  -bipap PRN post op   -maintain active T&S  -resumed all home meds    #bilateral acute DVT  s/p IVC filter placement  R groin WNL   continue with current increased ac BID lowdose lovenox-> recommend transition to full dose therapeutic AC next week with a NOAC in rehab assuming no worsening at surgical site and strength in lower extremities continues to improve      #constipation  -cont with bowel regimen    #HTN  stable  -cont lisinopril     FULL code      Progress Note Handoff  Pending: increase to full AC next week, SNF when available/ legal agreement pending probably for monday per case management and administration  Patient/Family discussion: POC discussed with pt and previously daughter at bedside  Disposition: SNF but undocumented, complex/care team working on it

## 2020-07-04 NOTE — CHART NOTE - NSCHARTNOTEFT_GEN_A_CORE
<<<RESIDENT DISCHARGE NOTE>>>     RAMIRO BETTENCOURT  [id number]    VITAL SIGNS:  T(F): 97.2 (07-04-20 @ 07:33), Max: 97.6 (07-03-20 @ 23:51)  HR: 62 (07-04-20 @ 07:33)  BP: 126/79 (07-04-20 @ 07:33)  SpO2: --      PHYSICAL EXAMINATION:  General: Awake, alert, oriented  Head & Neck: No JVD, atraumatic  Pulmonary: Clear to auscultation  Cardiovascular: Regular S1/S2, no murmurs  Gastrointestinal/Abdomen & Pelvis: Soft, non-tender, non-distended  Neurologic/Motor: 2/5 motor strength RLE, 3/5 LLE    TEST RESULTS:                        14.5   14.14 )-----------( 232      ( 04 Jul 2020 05:59 )             42.9       07-04    136  |  98  |  20  ----------------------------<  236<H>  4.8   |  27  |  0.6<L>    Ca    9.1      04 Jul 2020 05:59  Mg     1.9     07-04    TPro  6.5  /  Alb  3.9  /  TBili  0.3  /  DBili  x   /  AST  14  /  ALT  44<H>  /  AlkPhos  92  07-04      FINAL DISCHARGE INTERVIEW:  Resident(s) Present: (Name: Mara) RN Present: (Name:)    DISCHARGE MEDICATION RECONCILIATION  reviewed with Attending (Name:)    DISPOSITION:   [  ] Home,    [  ] Home with Visiting Nursing Services,   [ x   ]  SNF/ NH,    [   ] Acute Rehab (4A),   [   ] Other (Specify:)

## 2020-07-04 NOTE — PROGRESS NOTE ADULT - SUBJECTIVE AND OBJECTIVE BOX
SUBJECTIVE:    Patient is a 50y old Male who presents with a chief complaint of back pain, new onset DVT (03 Jul 2020 13:59)    Currently admitted to medicine with the primary diagnosis of Lumbar disc herniation     Today is hospital day 8d. This morning he is resting comfortably in bed and reports no new issues or overnight events.     PAST MEDICAL & SURGICAL HISTORY  Lumbar disc herniation  Kidney stones  HTN (hypertension)  Herniated lumbar intervertebral disc: discectomy  Kidney stone: ureteroscopy  Thrombus: thrombectomy of left leg    SOCIAL HISTORY:  Negative for smoking/alcohol/drug use.     ALLERGIES:  No Known Allergies    MEDICATIONS:  STANDING MEDICATIONS  amLODIPine   Tablet 5 milliGRAM(s) Oral daily  chlorhexidine 4% Liquid 1 Application(s) Topical <User Schedule>  enoxaparin Injectable 30 milliGRAM(s) SubCutaneous every 12 hours  methocarbamol 750 milliGRAM(s) Oral every 6 hours  multivitamin 1 Tablet(s) Oral daily  senna 2 Tablet(s) Oral at bedtime    PRN MEDICATIONS  oxycodone    5 mG/acetaminophen 325 mG 1 Tablet(s) Oral every 6 hours PRN  oxycodone    5 mG/acetaminophen 325 mG 2 Tablet(s) Oral every 6 hours PRN  polyethylene glycol 3350 17 Gram(s) Oral daily PRN    VITALS:   T(F): 97.2  HR: 62  BP: 126/79  RR: 18  SpO2: --    LABS:                        14.5   14.14 )-----------( 232      ( 04 Jul 2020 05:59 )             42.9     07-04    136  |  98  |  20  ----------------------------<  236<H>  4.8   |  27  |  0.6<L>    Ca    9.1      04 Jul 2020 05:59  Mg     1.9     07-04    TPro  6.5  /  Alb  3.9  /  TBili  0.3  /  DBili  x   /  AST  14  /  ALT  44<H>  /  AlkPhos  92  07-04                  RADIOLOGY:    PHYSICAL EXAM:  GEN: No acute distress  LUNGS: Clear to auscultation bilaterally   HEART: S1/S2 present. RRR.   ABD: Soft, non-tender, non-distended. Bowel sounds present  EXT: NC/NC/NE/2+PP/TINEO  NEURO: 3/5 LLE strnegth, 2/5 LLE

## 2020-07-04 NOTE — PROGRESS NOTE ADULT - ASSESSMENT
51 y/o male with PMH of kidney stones, HTN, lumbar disc herniation s/p discectomy in 2019, and venous stasis presents with c/o back pain     #L1-L3 disk herniation/Spinal Cord Compression  S/p Microdiscectomy 6/28, post-op day 4   Dexamethasone taper 4mg q12 (7/2), 2mg q12 (7/3), 2mg qd (7/4) and then d/c  Pain control Percocet 1 tab mild pain, 2 tab moderate pain  Continue Robaxin 750 q6  Cleared by neurosurgery for DVT PPX  Will increase Lovenox to 30mg BID  On POD #5 can start full dose anticoagulation   Continue PT    #Left posterior tibial and right peroneal veins DVT  Could not be anticoagulated due to planned operation  S/p IVC filter 6/28 subcutaneous BID  O/p with Schor for IVC filter removal  On LOvenox 30mg BID  At nursing home can switch to Eliquis full dose anticoagulation on 7/15/2020    #Constipation likely from opioid medication   Senna 2 tablets  Miralax 17 grams q/day  Encourage ambulation with PT    #HTN  D/c Lisinopril due to Low BP  Start Amlodipine 5mg qd    #Hx of episodes of shortness of breath while sleeping  Likely BRISA  Needs outpatient sleep study    #Diet: DASH    #Activity: As tolerated  #DVT Prophylaxis: Lovenox 30mg BID  #GI Prophylaxis: pantoprazole 40 mg q day  #Code Status: Full code   #Dispo: From home, pending placement

## 2020-07-05 PROCEDURE — 99232 SBSQ HOSP IP/OBS MODERATE 35: CPT

## 2020-07-05 RX ADMIN — CHLORHEXIDINE GLUCONATE 1 APPLICATION(S): 213 SOLUTION TOPICAL at 05:02

## 2020-07-05 RX ADMIN — METHOCARBAMOL 750 MILLIGRAM(S): 500 TABLET, FILM COATED ORAL at 23:46

## 2020-07-05 RX ADMIN — OXYCODONE AND ACETAMINOPHEN 2 TABLET(S): 5; 325 TABLET ORAL at 20:33

## 2020-07-05 RX ADMIN — METHOCARBAMOL 750 MILLIGRAM(S): 500 TABLET, FILM COATED ORAL at 05:01

## 2020-07-05 RX ADMIN — AMLODIPINE BESYLATE 5 MILLIGRAM(S): 2.5 TABLET ORAL at 05:01

## 2020-07-05 RX ADMIN — Medication 1 TABLET(S): at 13:12

## 2020-07-05 RX ADMIN — SENNA PLUS 2 TABLET(S): 8.6 TABLET ORAL at 21:20

## 2020-07-05 RX ADMIN — OXYCODONE AND ACETAMINOPHEN 2 TABLET(S): 5; 325 TABLET ORAL at 11:15

## 2020-07-05 RX ADMIN — ENOXAPARIN SODIUM 30 MILLIGRAM(S): 100 INJECTION SUBCUTANEOUS at 05:01

## 2020-07-05 RX ADMIN — METHOCARBAMOL 750 MILLIGRAM(S): 500 TABLET, FILM COATED ORAL at 13:12

## 2020-07-05 RX ADMIN — METHOCARBAMOL 750 MILLIGRAM(S): 500 TABLET, FILM COATED ORAL at 17:04

## 2020-07-05 RX ADMIN — ENOXAPARIN SODIUM 30 MILLIGRAM(S): 100 INJECTION SUBCUTANEOUS at 17:04

## 2020-07-05 NOTE — PROGRESS NOTE ADULT - SUBJECTIVE AND OBJECTIVE BOX
RAMIRO BETTENCOURT  50y  Male      Patient is a 50y old  Male who presents with a chief complaint of back pain, new onset DVT (04 Jul 2020 14:51)      INTERVAL HPI/OVERNIGHT EVENTS: no set backs in improvement of bilateral lower extremity strength. no back pain. had mild L hip pain positionally related. performing incentive spirometry, no dyspnea today       REVIEW OF SYSTEMS:  as above  All other review of systems negative    T(C): 36.1 (07-05-20 @ 07:38), Max: 36.1 (07-05-20 @ 07:38)  HR: 60 (07-05-20 @ 07:38) (60 - 74)  BP: 132/77 (07-05-20 @ 07:38) (123/76 - 134/66)  RR: 18 (07-05-20 @ 07:38) (18 - 18)  SpO2: 100% (07-04-20 @ 23:11) (100% - 100%)  Wt(kg): --Vital Signs Last 24 Hrs  T(C): 36.1 (05 Jul 2020 07:38), Max: 36.1 (05 Jul 2020 07:38)  T(F): 97 (05 Jul 2020 07:38), Max: 97 (05 Jul 2020 07:38)  HR: 60 (05 Jul 2020 07:38) (60 - 74)  BP: 132/77 (05 Jul 2020 07:38) (123/76 - 134/66)  BP(mean): --  RR: 18 (05 Jul 2020 07:38) (18 - 18)  SpO2: 100% (04 Jul 2020 23:11) (100% - 100%)      07-04-20 @ 07:01  -  07-05-20 @ 07:00  --------------------------------------------------------  IN: 0 mL / OUT: 1950 mL / NET: -1950 mL    07-05-20 @ 07:01  -  07-05-20 @ 13:07  --------------------------------------------------------  IN: 420 mL / OUT: 500 mL / NET: -80 mL        PHYSICAL EXAM:  GENERAL: deconditioned  PSYCH: no agitation, baseline mentation  NERVOUS SYSTEM:  Alert & Oriented X3, no new focal deficits, lumbar surgical site tiny surrounding bruising stable, dressing CDI no strike through bleeding or purulence  PULMONARY: Clear to percussion bilaterally; No rales, rhonchi, wheezing, or rubs  CARDIOVASCULAR: Regular rate and rhythm; No murmurs, rubs, or gallops  GI: Soft, Nontender, Nondistended; Bowel sounds present obese  EXTREMITIES:  2+ Peripheral Pulses, No clubbing, cyanosis, or edema, bilateral LE hyperpigmentation/ PVD changes    Consultant(s) Notes Reviewed:  [x ] YES  [ ] NO    Discussed with Consultants/Other Providers [ x] YES     LABS                          14.5   14.14 )-----------( 232      ( 04 Jul 2020 05:59 )             42.9     07-04    136  |  98  |  20  ----------------------------<  236<H>  4.8   |  27  |  0.6<L>    Ca    9.1      04 Jul 2020 05:59  Mg     1.9     07-04    TPro  6.5  /  Alb  3.9  /  TBili  0.3  /  DBili  x   /  AST  14  /  ALT  44<H>  /  AlkPhos  92  07-04          Lactate Trend        CAPILLARY BLOOD GLUCOSE            RADIOLOGY & ADDITIONAL TESTS:    Imaging Personally Reviewed:  [ ] YES  [ ] NO    HEALTH ISSUES - PROBLEM Dx:  DVT, lower extremity          #Progress Note Handoff    Pending (specify):  Consults_________, Tests________, Test Results_______, Other_________    Family discussion:    Disposition: Home___/SNF___/Other________/Unknown at this time________

## 2020-07-05 NOTE — PROGRESS NOTE ADULT - ASSESSMENT
51 y/o male with PMH of kidney stones, HTN, lumbar disc herniation s/p discectomy in 2019, and venous stasis presents with c/o back pain for 6 months which has progressive worsened over the past month radiating to lower limbs (R>L), inability to ambulate due to leg weakness and constipation liked due to degenerative spine disease c cord compression associated with new onset DVT    #LE weakness 2/2 acute spinal cord compression   s/p L2-L3 discectomy   completed dexamethasone taper per neurosx recommendation  cont present pain mgmt   -PT   strength in lower extremities marginally improving daily    #s/p discectomy 6/28  -encourage incentive spirometry during post operative period  -bipap PRN post op   -maintain active T&S  -resumed all home meds    #bilateral acute DVT  s/p IVC filter placement  R groin WNL   continue with current increased ac BID lowdose lovenox-> recommend transition to full dose therapeutic AC next week with a NOAC in rehab assuming no worsening at surgical site and strength in lower extremities continues to improve      #constipation  -cont with bowel regimen    #HTN  stable  -cont lisinopril     FULL code    will need outpatient pmd, neurosurgery, and vascular surgical follow ups after discharge/ potential removal of ivc filter after a 6-9 month course of therapeutic AC if becoming more ambulatory after rehab    Progress Note Handoff  Pending: repeat covid swab sent, snf for rehab pending for tomorrow, legal agreement needs to be finalized / anticipate for discharge  Patient/Family discussion: POC discussed with pt and previously daughter at bedside  Disposition: SNF, complex/care team working on it

## 2020-07-06 LAB — SARS-COV-2 RNA SPEC QL NAA+PROBE: SIGNIFICANT CHANGE UP

## 2020-07-06 PROCEDURE — 99232 SBSQ HOSP IP/OBS MODERATE 35: CPT

## 2020-07-06 RX ORDER — PANTOPRAZOLE SODIUM 20 MG/1
40 TABLET, DELAYED RELEASE ORAL
Refills: 0 | Status: DISCONTINUED | OUTPATIENT
Start: 2020-07-06 | End: 2020-07-10

## 2020-07-06 RX ADMIN — OXYCODONE AND ACETAMINOPHEN 2 TABLET(S): 5; 325 TABLET ORAL at 03:29

## 2020-07-06 RX ADMIN — SENNA PLUS 2 TABLET(S): 8.6 TABLET ORAL at 21:14

## 2020-07-06 RX ADMIN — METHOCARBAMOL 750 MILLIGRAM(S): 500 TABLET, FILM COATED ORAL at 11:10

## 2020-07-06 RX ADMIN — AMLODIPINE BESYLATE 5 MILLIGRAM(S): 2.5 TABLET ORAL at 05:26

## 2020-07-06 RX ADMIN — METHOCARBAMOL 750 MILLIGRAM(S): 500 TABLET, FILM COATED ORAL at 23:19

## 2020-07-06 RX ADMIN — ENOXAPARIN SODIUM 30 MILLIGRAM(S): 100 INJECTION SUBCUTANEOUS at 05:26

## 2020-07-06 RX ADMIN — OXYCODONE AND ACETAMINOPHEN 2 TABLET(S): 5; 325 TABLET ORAL at 11:05

## 2020-07-06 RX ADMIN — PANTOPRAZOLE SODIUM 40 MILLIGRAM(S): 20 TABLET, DELAYED RELEASE ORAL at 16:46

## 2020-07-06 RX ADMIN — ENOXAPARIN SODIUM 30 MILLIGRAM(S): 100 INJECTION SUBCUTANEOUS at 17:02

## 2020-07-06 RX ADMIN — METHOCARBAMOL 750 MILLIGRAM(S): 500 TABLET, FILM COATED ORAL at 05:26

## 2020-07-06 RX ADMIN — CHLORHEXIDINE GLUCONATE 1 APPLICATION(S): 213 SOLUTION TOPICAL at 05:26

## 2020-07-06 RX ADMIN — METHOCARBAMOL 750 MILLIGRAM(S): 500 TABLET, FILM COATED ORAL at 17:02

## 2020-07-06 RX ADMIN — Medication 1 TABLET(S): at 11:10

## 2020-07-06 NOTE — PROGRESS NOTE ADULT - ASSESSMENT
51 y/o male with PMH of kidney stones, HTN, lumbar disc herniation s/p discectomy in 2019, and venous stasis presents with c/o back pain for 6 months which has progressive worsened over the past month radiating to lower limbs (R>L), inability to ambulate due to leg weakness and constipation liked due to degenerative spine disease c cord compression associated with new onset DVT    #LE weakness 2/2 acute spinal cord compression   s/p L2-L3 discectomy   completed dexamethasone taper per neurosx recommendation  cont present pain mgmt   -PT   strength in lower extremities marginally improved s/p surgery. has been relatively stable over the past 3 days. working with staff/ PT, getting oobtc with help    #s/p discectomy 6/28  -encourage incentive spirometry during post operative period  -bipap PRN post op   -maintain active T&S  -resumed all home meds    #bilateral acute DVT  s/p IVC filter placement  R groin WNL   continue with current increased ac BID lowdose lovenox-> will transition to full dose therapeutic AC with a NOAC probably on wednesday if okay with neurosurgical team    #constipation  -cont with bowel regimen    #HTN  stable  -cont lisinopril     FULL code    will need outpatient pmd, neurosurgery, and vascular surgical follow ups after discharge/ potential removal of ivc filter after a 6-9 month course of therapeutic AC if becoming more ambulatory after rehab    Progress Note Handoff  Pending: covid reswab wednesday for expected snf discharge on friday- per CM  Patient/Family discussion: POC discussed with pt and previously daughter at bedside  Disposition: SNF, complex/care team working on it

## 2020-07-06 NOTE — PROGRESS NOTE ADULT - SUBJECTIVE AND OBJECTIVE BOX
RAMIRO BETTENCOURT 50y Male  MRN#: [id number]   CODE STATUS:________      SUBJECTIVE  Patient is a 50y old man who presented with a complaint of back pain which had been persistent for months with radiation to the lower extremities, R>L. In hospital neurosurgery performed microdiscectomy 6/28. Patient also has history of DVT in bilateral legs for which he received IVC filter; anti-coagulation being increased post-operation.     Today is hospital day 10. Patient examined at bedside; complaining of shortness of breath overnight but saturating at 98% on room air. Patient endorses that this shortness of breath is not new to him and has been present in years prior. No other complaints.    Present Today:           Samuels Catheter (X)No/ ()Yes? Indication:          Central Line (X)No/ ()Yes? Indication:          IV Fluids (X)No/ ()Yes? Type:  Rate:  Indication:      OBJECTIVE  PAST MEDICAL & SURGICAL HISTORY  Lumbar disc herniation  Kidney stones  HTN (hypertension)  Herniated lumbar intervertebral disc: discectomy  Kidney stone: ureteroscopy  Thrombus: thrombectomy of left leg    ALLERGIES:  No Known Allergies    MEDICATIONS:  STANDING MEDICATIONS  amLODIPine   Tablet 5 milliGRAM(s) Oral daily  chlorhexidine 4% Liquid 1 Application(s) Topical <User Schedule>  enoxaparin Injectable 30 milliGRAM(s) SubCutaneous every 12 hours  methocarbamol 750 milliGRAM(s) Oral every 6 hours  multivitamin 1 Tablet(s) Oral daily  senna 2 Tablet(s) Oral at bedtime    PRN MEDICATIONS  oxycodone    5 mG/acetaminophen 325 mG 1 Tablet(s) Oral every 6 hours PRN  oxycodone    5 mG/acetaminophen 325 mG 2 Tablet(s) Oral every 6 hours PRN  polyethylene glycol 3350 17 Gram(s) Oral daily PRN      VITAL SIGNS: Last 24 Hours  T(C): 36.7 (06 Jul 2020 07:30), Max: 36.7 (06 Jul 2020 07:30)  T(F): 98 (06 Jul 2020 07:30), Max: 98 (06 Jul 2020 07:30)  HR: 95 (06 Jul 2020 07:30) (78 - 95)  BP: 139/98 (06 Jul 2020 07:30) (121/69 - 147/84)  BP(mean): --  RR: 18 (06 Jul 2020 07:30) (18 - 18)  SpO2: 98% (06 Jul 2020 06:29) (98% - 98%)        PHYSICAL EXAM:    GENERAL: NAD, well-developed, AAOx3  HEENT:  Atraumatic, Normocephalic. EOMI, PERRLA, conjunctiva and sclera clear, No JVD  PULMONARY: Clear to auscultation bilaterally; No wheeze  CARDIOVASCULAR: Regular rate and rhythm; No murmurs, rubs, or gallops  GASTROINTESTINAL: Soft, Nontender, Nondistended; Bowel sounds present  MUSCULOSKELETAL:  2+ Peripheral Pulses, ecchymoses of right lower extremity. Range of motion in lower extremities limited by pain but patient able to move them.   NEUROLOGY: non-focal  SKIN: No rashes or lesions        ASSESSMENT & PLAN    1. L1-L3 disk herniation/Spinal Cord Compression  -S/p Microdiscectomy 6/28, post-op day 6  -Dexamethasone d.maggi  -Pain control Percocet 5mg/325 tablets: 1 for mild pain, 2 tab moderate pain  -Continue Robaxin 750 q6  -Cleared by neurosurgery for DVT PPX  -Lovenox at 30mg BID  -PT    2. Left posterior tibial and right peroneal veins DVT  -S/p IVC filter 6/28 subcutaneous BID  -O/p with Schor for IVC filter removal  -On LOvenox 30mg BID  -At nursing home can switch to Eliquis full dose     3. Constipation likely from opioid medication   -Senna 2 tablets  -Miralax 17 grams q/day  -Encourage ambulation with PT    4. HTN  -Continue amlodipine 5mg q24    5.Hx of episodes of shortness of breath while sleeping  -Likely BRISA  -Needs outpatient sleep study    #Diet: DASH    #Activity: As tolerated  #DVT Prophylaxis: Lovenox 30mg BID  #GI Prophylaxis: pantoprazole 40 mg q day  #Code Status: Full code   #Dispo: From home, pending placement

## 2020-07-06 NOTE — PROGRESS NOTE ADULT - SUBJECTIVE AND OBJECTIVE BOX
RAMIRO BETTENCOURT  50y  Male      Patient is a 50y old  Male who presents with a chief complaint of back pain, new onset DVT (06 Jul 2020 13:57)      INTERVAL HPI/OVERNIGHT EVENTS: no real difference in lower extremity power today. got oobtc with help today. doing exercises per PT recommendations. had a bit of L hip arthritic pains yesterday, okay today. has mild nocturnal dyspnea occasionally      REVIEW OF SYSTEMS:  as above  All other review of systems negative    T(C): 36.7 (07-06-20 @ 07:30), Max: 36.7 (07-06-20 @ 07:30)  HR: 95 (07-06-20 @ 07:30) (78 - 95)  BP: 139/98 (07-06-20 @ 07:30) (121/69 - 147/84)  RR: 18 (07-06-20 @ 07:30) (18 - 18)  SpO2: 98% (07-06-20 @ 06:29) (98% - 98%)  Wt(kg): --Vital Signs Last 24 Hrs  T(C): 36.7 (06 Jul 2020 07:30), Max: 36.7 (06 Jul 2020 07:30)  T(F): 98 (06 Jul 2020 07:30), Max: 98 (06 Jul 2020 07:30)  HR: 95 (06 Jul 2020 07:30) (78 - 95)  BP: 139/98 (06 Jul 2020 07:30) (121/69 - 147/84)  BP(mean): --  RR: 18 (06 Jul 2020 07:30) (18 - 18)  SpO2: 98% (06 Jul 2020 06:29) (98% - 98%)      07-05-20 @ 07:01  -  07-06-20 @ 07:00  --------------------------------------------------------  IN: 900 mL / OUT: 1700 mL / NET: -800 mL        PHYSICAL EXAM:  GENERAL: NAD  PSYCH: no agitation, baseline mentation  NERVOUS SYSTEM:  Alert & Oriented X3, no new focal deficits, R>L weakness, unchanged from prior  PULMONARY: Clear to percussion bilaterally; No rales, rhonchi, wheezing, or rubs  CARDIOVASCULAR: Regular rate and rhythm; No murmurs, rubs, or gallops  GI: Soft, Nontender, Nondistended; Bowel sounds present, rotund  EXTREMITIES:  2+ Peripheral Pulses, No clubbing, cyanosis, or edema, b/l LE hyperpigmentation indicative of pvd changes  MUSCULOSKELETAL: tiny bruising by lumbar surgical incision site, no drainage, stable in size    Consultant(s) Notes Reviewed:  [x ] YES  [ ] NO    Discussed with Consultants/Other Providers [ x] YES     LABS                  Lactate Trend        CAPILLARY BLOOD GLUCOSE            RADIOLOGY & ADDITIONAL TESTS:    Imaging Personally Reviewed:  [ ] YES  [ ] NO    HEALTH ISSUES - PROBLEM Dx:  DVT, lower extremity          #Progress Note Handoff    Pending (specify):  Consults_________, Tests________, Test Results_______, Other_________    Family discussion:    Disposition: Home___/SNF___/Other________/Unknown at this time________

## 2020-07-06 NOTE — CHART NOTE - NSCHARTNOTEFT_GEN_A_CORE
Registered Dietitian Follow-Up     Patient Profile Reviewed                           Yes [x]   No []     Nutrition History Previously Obtained        Yes [x]  No []       Pertinent Subjective Information: Spoke with pt who remains with great appetite/po intake, still consuming % of all meal trays w/o any complaints.      Pertinent Medical Interventions: No set backs in improvement of bilateral lower extremity strength. No back pain, but had mild L hip pain positionally related; performing incentive spirometry, no dyspnea today. Repeat covid swab sent, SNF for rehab pending for tomorrow.    Diet order: DASH/TLC      Anthropometrics:  - Ht. 64"  - Wt. no new wts   (6/28): 100kg  - BMI: 41.6   - IBW: 130#     Pertinent Lab Data: (7/4): Cr. 0.6, Gluc 236     Pertinent Meds: lovenox, amlodipine, miralax, senna, MVI     Physical Findings:  - Appearance: alert and oriented; 1+ edema to B/L leg   - GI function: LBM 7/6  - Oral/Mouth cavity: denies difficulty swallowing/chewing  - Skin: surgical incision     Nutrition Requirements  Weight Used: ideal  130#/59kg; needs continued from RD note on 7/2      Calories: 1220-2553 kcal/day (30-35 kcal/kg IBW)--increased needs d/t severe PCM   Protein: 71-89 gm/day (1.2-1.5 gm/kg IBW)--same as above   Fluid: 1 ml/kcal or per LIP     Nutrient Intake: meeting kcal/pro needs at this time      Previous Nutrition Diagnosis: (1) Severe Protein-Calorie Malnutrition, (2) Unintended Weight Loss (resolved, but PCM ongoing, however pt meeting estimated nutrient needs and maintaining CBW, therefore will not keep pt at risk)     Nutrition Intervention: meals and snacks, vitamins/minerals     Recommendations:  1. Continue current diet order      Goal/Expected Outcome: Pt to maintain % po intake of meals and snacks over the next 7 days      Indicator/Monitoring: RD to monitor energy intake, glucose profile, nutrition focused physical findings

## 2020-07-07 LAB
ALBUMIN SERPL ELPH-MCNC: 3.9 G/DL — SIGNIFICANT CHANGE UP (ref 3.5–5.2)
ALP SERPL-CCNC: 86 U/L — SIGNIFICANT CHANGE UP (ref 30–115)
ALT FLD-CCNC: 70 U/L — HIGH (ref 0–41)
ANION GAP SERPL CALC-SCNC: 12 MMOL/L — SIGNIFICANT CHANGE UP (ref 7–14)
AST SERPL-CCNC: 32 U/L — SIGNIFICANT CHANGE UP (ref 0–41)
BASOPHILS # BLD AUTO: 0.03 K/UL — SIGNIFICANT CHANGE UP (ref 0–0.2)
BASOPHILS NFR BLD AUTO: 0.3 % — SIGNIFICANT CHANGE UP (ref 0–1)
BILIRUB SERPL-MCNC: 0.5 MG/DL — SIGNIFICANT CHANGE UP (ref 0.2–1.2)
BUN SERPL-MCNC: 14 MG/DL — SIGNIFICANT CHANGE UP (ref 10–20)
CALCIUM SERPL-MCNC: 9.3 MG/DL — SIGNIFICANT CHANGE UP (ref 8.5–10.1)
CHLORIDE SERPL-SCNC: 96 MMOL/L — LOW (ref 98–110)
CO2 SERPL-SCNC: 28 MMOL/L — SIGNIFICANT CHANGE UP (ref 17–32)
CREAT SERPL-MCNC: 0.7 MG/DL — SIGNIFICANT CHANGE UP (ref 0.7–1.5)
EOSINOPHIL # BLD AUTO: 0.13 K/UL — SIGNIFICANT CHANGE UP (ref 0–0.7)
EOSINOPHIL NFR BLD AUTO: 1.2 % — SIGNIFICANT CHANGE UP (ref 0–8)
GLUCOSE SERPL-MCNC: 163 MG/DL — HIGH (ref 70–99)
HCT VFR BLD CALC: 45.4 % — SIGNIFICANT CHANGE UP (ref 42–52)
HGB BLD-MCNC: 15.2 G/DL — SIGNIFICANT CHANGE UP (ref 14–18)
IMM GRANULOCYTES NFR BLD AUTO: 2.7 % — HIGH (ref 0.1–0.3)
LYMPHOCYTES # BLD AUTO: 2.54 K/UL — SIGNIFICANT CHANGE UP (ref 1.2–3.4)
LYMPHOCYTES # BLD AUTO: 23.6 % — SIGNIFICANT CHANGE UP (ref 20.5–51.1)
MCHC RBC-ENTMCNC: 28.4 PG — SIGNIFICANT CHANGE UP (ref 27–31)
MCHC RBC-ENTMCNC: 33.5 G/DL — SIGNIFICANT CHANGE UP (ref 32–37)
MCV RBC AUTO: 84.7 FL — SIGNIFICANT CHANGE UP (ref 80–94)
MONOCYTES # BLD AUTO: 0.83 K/UL — HIGH (ref 0.1–0.6)
MONOCYTES NFR BLD AUTO: 7.7 % — SIGNIFICANT CHANGE UP (ref 1.7–9.3)
NEUTROPHILS # BLD AUTO: 6.93 K/UL — HIGH (ref 1.4–6.5)
NEUTROPHILS NFR BLD AUTO: 64.5 % — SIGNIFICANT CHANGE UP (ref 42.2–75.2)
NRBC # BLD: 0 /100 WBCS — SIGNIFICANT CHANGE UP (ref 0–0)
PLATELET # BLD AUTO: 183 K/UL — SIGNIFICANT CHANGE UP (ref 130–400)
POTASSIUM SERPL-MCNC: 4.4 MMOL/L — SIGNIFICANT CHANGE UP (ref 3.5–5)
POTASSIUM SERPL-SCNC: 4.4 MMOL/L — SIGNIFICANT CHANGE UP (ref 3.5–5)
PROT SERPL-MCNC: 6.7 G/DL — SIGNIFICANT CHANGE UP (ref 6–8)
RBC # BLD: 5.36 M/UL — SIGNIFICANT CHANGE UP (ref 4.7–6.1)
RBC # FLD: 14.1 % — SIGNIFICANT CHANGE UP (ref 11.5–14.5)
SODIUM SERPL-SCNC: 136 MMOL/L — SIGNIFICANT CHANGE UP (ref 135–146)
WBC # BLD: 10.75 K/UL — SIGNIFICANT CHANGE UP (ref 4.8–10.8)
WBC # FLD AUTO: 10.75 K/UL — SIGNIFICANT CHANGE UP (ref 4.8–10.8)

## 2020-07-07 PROCEDURE — 99233 SBSQ HOSP IP/OBS HIGH 50: CPT

## 2020-07-07 RX ORDER — ACETAMINOPHEN 500 MG
650 TABLET ORAL EVERY 6 HOURS
Refills: 0 | Status: DISCONTINUED | OUTPATIENT
Start: 2020-07-07 | End: 2020-07-10

## 2020-07-07 RX ORDER — APIXABAN 2.5 MG/1
10 TABLET, FILM COATED ORAL EVERY 12 HOURS
Refills: 0 | Status: DISCONTINUED | OUTPATIENT
Start: 2020-07-07 | End: 2020-07-10

## 2020-07-07 RX ORDER — TRAMADOL HYDROCHLORIDE 50 MG/1
25 TABLET ORAL EVERY 8 HOURS
Refills: 0 | Status: DISCONTINUED | OUTPATIENT
Start: 2020-07-07 | End: 2020-07-10

## 2020-07-07 RX ORDER — LANOLIN ALCOHOL/MO/W.PET/CERES
1 CREAM (GRAM) TOPICAL AT BEDTIME
Refills: 0 | Status: DISCONTINUED | OUTPATIENT
Start: 2020-07-07 | End: 2020-07-10

## 2020-07-07 RX ORDER — OXYCODONE AND ACETAMINOPHEN 5; 325 MG/1; MG/1
1 TABLET ORAL EVERY 6 HOURS
Refills: 0 | Status: DISCONTINUED | OUTPATIENT
Start: 2020-07-07 | End: 2020-07-10

## 2020-07-07 RX ADMIN — ENOXAPARIN SODIUM 30 MILLIGRAM(S): 100 INJECTION SUBCUTANEOUS at 05:04

## 2020-07-07 RX ADMIN — PANTOPRAZOLE SODIUM 40 MILLIGRAM(S): 20 TABLET, DELAYED RELEASE ORAL at 05:05

## 2020-07-07 RX ADMIN — METHOCARBAMOL 750 MILLIGRAM(S): 500 TABLET, FILM COATED ORAL at 17:36

## 2020-07-07 RX ADMIN — CHLORHEXIDINE GLUCONATE 1 APPLICATION(S): 213 SOLUTION TOPICAL at 05:04

## 2020-07-07 RX ADMIN — Medication 1 MILLIGRAM(S): at 21:58

## 2020-07-07 RX ADMIN — METHOCARBAMOL 750 MILLIGRAM(S): 500 TABLET, FILM COATED ORAL at 05:04

## 2020-07-07 RX ADMIN — AMLODIPINE BESYLATE 5 MILLIGRAM(S): 2.5 TABLET ORAL at 05:04

## 2020-07-07 RX ADMIN — SENNA PLUS 2 TABLET(S): 8.6 TABLET ORAL at 21:58

## 2020-07-07 RX ADMIN — OXYCODONE AND ACETAMINOPHEN 1 TABLET(S): 5; 325 TABLET ORAL at 16:09

## 2020-07-07 RX ADMIN — Medication 1 TABLET(S): at 11:17

## 2020-07-07 RX ADMIN — METHOCARBAMOL 750 MILLIGRAM(S): 500 TABLET, FILM COATED ORAL at 23:22

## 2020-07-07 RX ADMIN — METHOCARBAMOL 750 MILLIGRAM(S): 500 TABLET, FILM COATED ORAL at 11:17

## 2020-07-07 RX ADMIN — POLYETHYLENE GLYCOL 3350 17 GRAM(S): 17 POWDER, FOR SOLUTION ORAL at 00:42

## 2020-07-07 RX ADMIN — APIXABAN 10 MILLIGRAM(S): 2.5 TABLET, FILM COATED ORAL at 16:09

## 2020-07-07 NOTE — PROGRESS NOTE ADULT - SUBJECTIVE AND OBJECTIVE BOX
RAMIRO BETTENCOURT  50y  Male      Patient is a 50y old  Male who presents with a chief complaint of back pain, new onset DVT (07 Jul 2020 11:29)      INTERVAL HPI/OVERNIGHT EVENTS: back and hip pain controlled. strength gradually improving in b/l LE's. RLE still weaker than L      REVIEW OF SYSTEMS:  as above  All other review of systems negative    T(C): 35.9 (07-07-20 @ 08:00), Max: 36.7 (07-07-20 @ 00:00)  HR: 80 (07-07-20 @ 08:00) (80 - 88)  BP: 125/86 (07-07-20 @ 08:00) (121/80 - 136/87)  RR: 18 (07-07-20 @ 08:00) (18 - 18)  SpO2: 98% (07-07-20 @ 00:00) (98% - 98%)  Wt(kg): --Vital Signs Last 24 Hrs  T(C): 35.9 (07 Jul 2020 08:00), Max: 36.7 (07 Jul 2020 00:00)  T(F): 96.6 (07 Jul 2020 08:00), Max: 98 (07 Jul 2020 00:00)  HR: 80 (07 Jul 2020 08:00) (80 - 88)  BP: 125/86 (07 Jul 2020 08:00) (121/80 - 136/87)  BP(mean): --  RR: 18 (07 Jul 2020 08:00) (18 - 18)  SpO2: 98% (07 Jul 2020 00:00) (98% - 98%)      07-06-20 @ 07:01  -  07-07-20 @ 07:00  --------------------------------------------------------  IN: 0 mL / OUT: 1700 mL / NET: -1700 mL    07-07-20 @ 07:01  -  07-07-20 @ 14:16  --------------------------------------------------------  IN: 480 mL / OUT: 400 mL / NET: 80 mL        PHYSICAL EXAM:  GENERAL: NAD  PSYCH: no agitation, baseline mentation  NERVOUS SYSTEM:  Alert & Oriented X3, no new focal deficits, r>L weakness improving 3/5; 4/5 respectively now  PULMONARY: Clear to percussion bilaterally; No rales, rhonchi, wheezing, or rubs  CARDIOVASCULAR: Regular rate and rhythm; No murmurs, rubs, or gallops  GI: Soft, Nontender, Nondistended; Bowel sounds present  EXTREMITIES:  2+ Peripheral Pulses, No clubbing, cyanosis, or edema, b/l Le hyperpigmentation    Consultant(s) Notes Reviewed:  [x ] YES  [ ] NO    Discussed with Consultants/Other Providers [ x] YES     LABS                          15.2   10.75 )-----------( 183      ( 07 Jul 2020 07:31 )             45.4     07-07    136  |  96<L>  |  14  ----------------------------<  163<H>  4.4   |  28  |  0.7    Ca    9.3      07 Jul 2020 07:31    TPro  6.7  /  Alb  3.9  /  TBili  0.5  /  DBili  x   /  AST  32  /  ALT  70<H>  /  AlkPhos  86  07-07          Lactate Trend        CAPILLARY BLOOD GLUCOSE            RADIOLOGY & ADDITIONAL TESTS:    Imaging Personally Reviewed:  [ ] YES  [ ] NO    HEALTH ISSUES - PROBLEM Dx:  DVT, lower extremity

## 2020-07-07 NOTE — PROGRESS NOTE ADULT - SUBJECTIVE AND OBJECTIVE BOX
RAMIRO BETTENCOURT 50y Male  MRN#: [id number]   CODE STATUS:________      SUBJECTIVE  Patient is a 50y old man who presented with a complaint of back pain which had been persistent for months with radiation to the lower extremities, R>L. In hospital neurosurgery performed microdiscectomy 6/28. Patient also has history of DVT in bilateral legs for which he received IVC filter; anti-coagulation being increased post-operation.     Today is hospital day 11. Patient examined at bedside. No new complaints.    Present Today:           Samuels Catheter (X)No/ ()Yes? Indication:          Central Line (X)No/ ()Yes? Indication:          IV Fluids (X)No/ ()Yes? Type:  Rate:  Indication:      OBJECTIVE  PAST MEDICAL & SURGICAL HISTORY  Lumbar disc herniation  Kidney stones  HTN (hypertension)  Herniated lumbar intervertebral disc: discectomy  Kidney stone: ureteroscopy  Thrombus: thrombectomy of left leg    ALLERGIES:  No Known Allergies    MEDICATIONS:  STANDING MEDICATIONS  amLODIPine   Tablet 5 milliGRAM(s) Oral daily  apixaban 10 milliGRAM(s) Oral every 12 hours  chlorhexidine 4% Liquid 1 Application(s) Topical <User Schedule>  melatonin 1 milliGRAM(s) Oral at bedtime  methocarbamol 750 milliGRAM(s) Oral every 6 hours  multivitamin 1 Tablet(s) Oral daily  pantoprazole    Tablet 40 milliGRAM(s) Oral before breakfast  senna 2 Tablet(s) Oral at bedtime    PRN MEDICATIONS  polyethylene glycol 3350 17 Gram(s) Oral daily PRN      VITAL SIGNS: Last 24 Hours  T(C): 35.9 (07 Jul 2020 08:00), Max: 36.7 (07 Jul 2020 00:00)  T(F): 96.6 (07 Jul 2020 08:00), Max: 98 (07 Jul 2020 00:00)  HR: 80 (07 Jul 2020 08:00) (80 - 88)  BP: 125/86 (07 Jul 2020 08:00) (121/80 - 136/87)  BP(mean): --  RR: 18 (07 Jul 2020 08:00) (18 - 18)  SpO2: 98% (07 Jul 2020 00:00) (98% - 98%)    LABS:                        15.2   10.75 )-----------( 183      ( 07 Jul 2020 07:31 )             45.4     07-07    136  |  96<L>  |  14  ----------------------------<  163<H>  4.4   |  28  |  0.7    Ca    9.3      07 Jul 2020 07:31    TPro  6.7  /  Alb  3.9  /  TBili  0.5  /  DBili  x   /  AST  32  /  ALT  70<H>  /  AlkPhos  86  07-07      PHYSICAL EXAM:    GENERAL: NAD, well-developed, AAOx3  HEENT:  Atraumatic, Normocephalic. EOMI, PERRLA, conjunctiva and sclera clear, No JVD  PULMONARY: Clear to auscultation bilaterally; No wheeze  CARDIOVASCULAR: Regular rate and rhythm; No murmurs, rubs, or gallops  GASTROINTESTINAL: Soft, Nontender, Nondistended; Bowel sounds present  MUSCULOSKELETAL:  2+ Peripheral Pulses, ecchymoses of right lower extremity. Range of motion in lower extremities limited by pain but patient able to move them.   NEUROLOGY: non-focal  SKIN: No rashes or lesions        ASSESSMENT & PLAN    1. L1-L3 disk herniation/Spinal Cord Compression  -S/p Microdiscectomy 6/28, post-op day 7  -Pain controlled; PRN pain control  -Continue Robaxin 750 q6  -Cleared by neurosurgery for full DVT PPX; initiated on apixaban 10mg BID  -PT    2. Left posterior tibial and right peroneal veins DVT  -S/p IVC filter 6/28 subcutaneous BID  -O/p with Schor for IVC filter removal  -On apixaban 10mg BID    3. Constipation likely from opioid medication   -Senna 2 tablets  -Miralax 17 grams q/day  -Encourage ambulation with PT    4. HTN  -Continue amlodipine 5mg q24    5.Hx of episodes of shortness of breath while sleeping  -Likely BRISA  -Needs outpatient sleep study    #Diet: DASH    #Activity: As tolerated  #DVT Prophylaxis: Lovenox 30mg BID  #GI Prophylaxis: pantoprazole 40 mg q day  #Code Status: Full code   #Dispo: From home, pending placement

## 2020-07-07 NOTE — PROGRESS NOTE ADULT - ASSESSMENT
51 y/o male with PMH of kidney stones, HTN, lumbar disc herniation s/p discectomy in 2019, and venous stasis presents with c/o back pain for 6 months which has progressive worsened over the past month radiating to lower limbs (R>L), inability to ambulate due to leg weakness and constipation liked due to degenerative spine disease c cord compression associated with new onset DVT    #LE weakness 2/2 acute spinal cord compression   s/p L2-L3 discectomy   completed dexamethasone taper per neurosx recommendation  escalated analgesia prn to encourage patient to participate more with PT  -c/w PT  strength in lower extremities marginally improved s/p surgery. has been relatively stable over the past 4 days. working with staff/ PT, getting oobtc with help  neurosurgeon recommends referral to spinal cord injury rehab in [facility] per CM/ not accepting new patients     #s/p discectomy 6/28  -encourage incentive spirometry during post operative period  -bipap PRN post op   -maintain active T&S  -resumed all home meds    #bilateral acute DVT  s/p IVC filter placement  R groin WNL   I discussed case personally with neurosurgeon today, okay to start full anticoagulation-> start NOAC loading then maintenance  outpatient vascular surgery follow up for removal of brianna filter after rehab     #constipation  -cont with bowel regimen    #HTN  stable  -cont lisinopril     FULL code    will need outpatient pmd, neurosurgery, and vascular surgical follow ups after discharge/ potential removal of ivc filter after a 6-9 month course of therapeutic AC if becoming more ambulatory after rehab    Progress Note Handoff  Pending: covid reswab wednesday for expected snf discharge on friday- per CM  Patient/Family discussion: POC discussed with pt and previously daughter at bedside  Disposition: SNF, complex/care team working on it

## 2020-07-07 NOTE — PROGRESS NOTE ADULT - SUBJECTIVE AND OBJECTIVE BOX
49 yo patient POD#9 s/p L2-3 microdiskectomy/IVC filter placement (Dr Wu). States he is feeling better and has noticed improvement in some of his lower extremities. Admits he has not participated with PT today. Denies any fevers, night sweats, chills, nausea, or vomiting at this time.      Admitting HPI:  51 y/o male with PMH of kidney stones, HTN, lumbar disc herniation s/p discectomy in 2019, and venous stasis presents with c/o back pain for 6 months which has progressive worsened over the past month radiating to lower limbs (R>L), inability to ambulate due to leg weakness and constipation. Patient has been in [place] for past 4 months and had an X-ray done there which was normal and was prescribed pain medications. Patient returned last night from [place] and came to the ED.  In ED, vital signs were 97.2 f, /94 mm hg, HR 84 bpm, RR 17 and 100% o2 on RA. Patient reports b/l lower extremity weakness associated with burning sensation radiating to b/l extremities (R>L). In the ED, preliminary doppler was done and positive for left femoral DVT likely from being bedridden for a month at home due to pain. No hx IVDA. Denies diarrhea, urinary sxs, fever, chills, chest pain, dyspnea, no saddle anesthesia, bowel/bladder incontinence/retention. (26 Jun 2020 15:19)      REVIEW OF SYSTEMS:      All other systems  reviewed as negative other than what was mentioned in the HPI      Vital Signs Last 24 Hrs  T(C): 35.9 (07 Jul 2020 08:00), Max: 36.7 (07 Jul 2020 00:00)  T(F): 96.6 (07 Jul 2020 08:00), Max: 98 (07 Jul 2020 00:00)  HR: 80 (07 Jul 2020 08:00) (80 - 88)  BP: 125/86 (07 Jul 2020 08:00) (121/80 - 136/87)  BP(mean): --  RR: 18 (07 Jul 2020 08:00) (18 - 18)  SpO2: 98% (07 Jul 2020 00:00) (98% - 98%)    PHYSICAL EXAM:  GENERAL: NAD, well-groomed, overweight gentleman  HEAD:  Atraumatic, normocephalic  WOUND: Dressing stained dry intact    MENTAL STATUS: AAO x3; Awake; Opens eyes spontaneously; Appropriately conversant without aphasia; following simple commands  CRANIAL NERVES: Visual acuity normal for age, visual fields full to confrontation, PERRL. EOMI without nystagmus. Facial sensation intact V1-3 distribution b/l. Head turning and shoulder shrug intact.   REFLEXES: PERRL. Corneals intact b/l. Gag intact. Cough intact. Oculocephalic reflex intact (Doll's eye). Negative Dunbar's b/l. Negative clonus b/l  MOTOR: strength 5/5 b/l upper extremities  Lowers             HF       KE      DF     EHL     PF      R                     3/5      3-/5    3/5    4/5     3/5  L                     4-/5     4/5     2/5    2/5      2/5    SENSATION: grossly intact to light touch all extremities    ABDOMEN: Soft, nontender, nondistended; increased body habitus  EXTREMITIES:  2+ peripheral pulses, no clubbing, cyanosis, or edema  SKIN: Warm, dry; no rashes or lesions    LABS:                        15.2   10.75 )-----------( 183      ( 07 Jul 2020 07:31 )             45.4     07-07    136  |  96<L>  |  14  ----------------------------<  163<H>  4.4   |  28  |  0.7    Ca    9.3      07 Jul 2020 07:31    TPro  6.7  /  Alb  3.9  /  TBili  0.5  /  DBili  x   /  AST  32  /  ALT  70<H>  /  AlkPhos  86  07-07          07-06 @ 07:01  -  07-07 @ 07:00  --------------------------------------------------------  IN: 0 mL / OUT: 1700 mL / NET: -1700 mL

## 2020-07-08 PROCEDURE — 99233 SBSQ HOSP IP/OBS HIGH 50: CPT

## 2020-07-08 RX ORDER — PANTOPRAZOLE SODIUM 20 MG/1
40 TABLET, DELAYED RELEASE ORAL AT BEDTIME
Refills: 0 | Status: DISCONTINUED | OUTPATIENT
Start: 2020-07-08 | End: 2020-07-10

## 2020-07-08 RX ADMIN — CHLORHEXIDINE GLUCONATE 1 APPLICATION(S): 213 SOLUTION TOPICAL at 10:09

## 2020-07-08 RX ADMIN — AMLODIPINE BESYLATE 5 MILLIGRAM(S): 2.5 TABLET ORAL at 05:04

## 2020-07-08 RX ADMIN — METHOCARBAMOL 750 MILLIGRAM(S): 500 TABLET, FILM COATED ORAL at 17:04

## 2020-07-08 RX ADMIN — METHOCARBAMOL 750 MILLIGRAM(S): 500 TABLET, FILM COATED ORAL at 11:13

## 2020-07-08 RX ADMIN — METHOCARBAMOL 750 MILLIGRAM(S): 500 TABLET, FILM COATED ORAL at 05:04

## 2020-07-08 RX ADMIN — SENNA PLUS 2 TABLET(S): 8.6 TABLET ORAL at 21:03

## 2020-07-08 RX ADMIN — Medication 1 TABLET(S): at 11:13

## 2020-07-08 RX ADMIN — PANTOPRAZOLE SODIUM 40 MILLIGRAM(S): 20 TABLET, DELAYED RELEASE ORAL at 21:03

## 2020-07-08 NOTE — PROVIDER CONTACT NOTE (OTHER) - SITUATION
Pt complaining of SOB and rt hip pain. vital signs stable o2 sat 98. MD Vilchis made aware came to assess patient. will continue to monitor
pt complainig of gas pain last BM was yesterday. pt also complains of not being able to sleep. pt legs upon assessment are cool to touch but pulses are present MD Vilchis made aware
pt has no iv access but has no medicine through iv and should be discharged soon. MD Mcneal made aware said pt does not need iv access at this time.
Pt complained about B/L LE feeling cold to touch and numbness. RN used Doppler and found pulse on both LE.
Pt presents on assessment with R leg numbness, no tingling.

## 2020-07-08 NOTE — PROGRESS NOTE ADULT - ATTENDING COMMENTS
Patient seen and examined independently of resident. My addendum supersedes resident notation. Case discussed with housestaff, nursing and patient
Agree with resident's note, HPI, PE, assessment and plan.  Pt was seen and examined independently.     Pain is controlled, continue PT.  COVID swab today, anticipated DC Friday.  OP f/u with NSx next week.   Steroid taper completed, AC restarted.
Patient seen and examined independently of resident. My addendum supersedes resident notation. Case discussed with housestaff, nursing and patient
50 year old with DVT     needs neurosurgery  will plan filter prior to OR
Patient seen and examined independently of resident. My addendum supersedes resident notation. Case discussed with housestaff, nursing and patient

## 2020-07-08 NOTE — PROVIDER CONTACT NOTE (OTHER) - REASON
R leg numbness
pt B/L LE was cold to touch
pt complaining of SOB and rt hip pain
pt complaining of gas pain and not being able to sleep
pt has no iv access

## 2020-07-08 NOTE — PROGRESS NOTE ADULT - SUBJECTIVE AND OBJECTIVE BOX
RAMIRO BETTENCOURT 50y Male  MRN#: [id number]   CODE STATUS:      SUBJECTIVE  Patient is a 50y old man who presented with a complaint of back pain which had been persistent for months with radiation to the lower extremities, R>L. In hospital neurosurgery performed microdiscectomy 6/28. Patient also has history of DVT in bilateral legs for which he received IVC filter; anti-coagulation being increased post-operation.     Today is hospital day 12. Patient examined at bedside. No new complaints.      Present Today:           Samuels Catheter (X)No/ ()Yes? Indication:          Central Line (X)No/ ()Yes? Indication:          IV Fluids (X)No/ ()Yes? Type:  Rate:  Indication:      OBJECTIVE  PAST MEDICAL & SURGICAL HISTORY  Lumbar disc herniation  Kidney stones  HTN (hypertension)  Herniated lumbar intervertebral disc: discectomy  Kidney stone: ureteroscopy  Thrombus: thrombectomy of left leg    ALLERGIES:  No Known Allergies    MEDICATIONS:  STANDING MEDICATIONS  amLODIPine   Tablet 5 milliGRAM(s) Oral daily  apixaban 10 milliGRAM(s) Oral every 12 hours  chlorhexidine 4% Liquid 1 Application(s) Topical <User Schedule>  melatonin 1 milliGRAM(s) Oral at bedtime  methocarbamol 750 milliGRAM(s) Oral every 6 hours  multivitamin 1 Tablet(s) Oral daily  pantoprazole    Tablet 40 milliGRAM(s) Oral before breakfast  senna 2 Tablet(s) Oral at bedtime    PRN MEDICATIONS  acetaminophen   Tablet .. 650 milliGRAM(s) Oral every 6 hours PRN  oxycodone    5 mG/acetaminophen 325 mG 1 Tablet(s) Oral every 6 hours PRN  polyethylene glycol 3350 17 Gram(s) Oral daily PRN  traMADol 25 milliGRAM(s) Oral every 8 hours PRN      VITAL SIGNS: Last 24 Hours  T(C): 36.5 (08 Jul 2020 08:00), Max: 37.1 (07 Jul 2020 15:59)  T(F): 97.7 (08 Jul 2020 08:00), Max: 98.7 (07 Jul 2020 15:59)  HR: 74 (08 Jul 2020 08:00) (74 - 88)  BP: 120/81 (08 Jul 2020 08:00) (120/81 - 127/83)  BP(mean): 73 (08 Jul 2020 00:00) (73 - 73)  RR: 18 (08 Jul 2020 08:00) (18 - 18)  SpO2: 95% (08 Jul 2020 05:00) (95% - 96%)    LABS:                        15.2   10.75 )-----------( 183      ( 07 Jul 2020 07:31 )             45.4     07-07    136  |  96<L>  |  14  ----------------------------<  163<H>  4.4   |  28  |  0.7    Ca    9.3      07 Jul 2020 07:31    TPro  6.7  /  Alb  3.9  /  TBili  0.5  /  DBili  x   /  AST  32  /  ALT  70<H>  /  AlkPhos  86  07-07          PHYSICAL EXAM:    GENERAL: NAD, well-developed, AAOx3  HEENT:  Atraumatic, Normocephalic. EOMI, PERRLA, conjunctiva and sclera clear, No JVD  PULMONARY: Clear to auscultation bilaterally; No wheeze  CARDIOVASCULAR: Regular rate and rhythm; No murmurs, rubs, or gallops  GASTROINTESTINAL: Soft, Nontender, Nondistended; Bowel sounds present  MUSCULOSKELETAL:  2+ Peripheral Pulses, ecchymoses of right lower extremity. Range of motion in lower extremities limited by pain but patient able to move them.   NEUROLOGY: non-focal  SKIN: No rashes or lesions      ASSESSMENT & PLAN    1. L1-L3 disk herniation/Spinal Cord Compression  -S/p Microdiscectomy 6/28, post-op day 8  -Pain controlled; PRN pain control  -Continue Robaxin 750 q6  -Continue apixaban 10mg BID  -PT    2. Left posterior tibial and right peroneal veins DVT  -S/p IVC filter 6/28 subcutaneous BID  -O/p with Schor for IVC filter removal  -On apixaban 10mg BID    3. Constipation likely from opioid medication   -Senna 2 tablets  -Miralax 17 grams q/day  -Encourage ambulation with PT    4. HTN  -Continue amlodipine 5mg q24    5.Hx of episodes of shortness of breath while sleeping  -Likely BRISA  -Needs outpatient sleep study    #Diet: DASH    #Activity: As tolerated  #DVT Prophylaxis: Lovenox 30mg BID  #GI Prophylaxis: pantoprazole 40 mg q day  #Code Status: Full code   #Dispo: From home, pending placement

## 2020-07-08 NOTE — PROVIDER CONTACT NOTE (OTHER) - ACTION/TREATMENT ORDERED:
MD Vilchis said he would put in a sleeping pill order for pt and come to check on pt in regards to pt legs being cool since pt was admitted for a dvt.
Iesha Noyola will come up to assess pt now.
MD Jacob aware. As per MD Jacob, "I will come up in a little bit and check on the patient."

## 2020-07-09 LAB
ALBUMIN SERPL ELPH-MCNC: 3.7 G/DL — SIGNIFICANT CHANGE UP (ref 3.5–5.2)
ALP SERPL-CCNC: 81 U/L — SIGNIFICANT CHANGE UP (ref 30–115)
ALT FLD-CCNC: 65 U/L — HIGH (ref 0–41)
ANION GAP SERPL CALC-SCNC: 14 MMOL/L — SIGNIFICANT CHANGE UP (ref 7–14)
AST SERPL-CCNC: 23 U/L — SIGNIFICANT CHANGE UP (ref 0–41)
BASOPHILS # BLD AUTO: 0.02 K/UL — SIGNIFICANT CHANGE UP (ref 0–0.2)
BASOPHILS NFR BLD AUTO: 0.2 % — SIGNIFICANT CHANGE UP (ref 0–1)
BILIRUB SERPL-MCNC: 0.4 MG/DL — SIGNIFICANT CHANGE UP (ref 0.2–1.2)
BUN SERPL-MCNC: 18 MG/DL — SIGNIFICANT CHANGE UP (ref 10–20)
CALCIUM SERPL-MCNC: 8.7 MG/DL — SIGNIFICANT CHANGE UP (ref 8.5–10.1)
CHLORIDE SERPL-SCNC: 99 MMOL/L — SIGNIFICANT CHANGE UP (ref 98–110)
CO2 SERPL-SCNC: 23 MMOL/L — SIGNIFICANT CHANGE UP (ref 17–32)
CREAT SERPL-MCNC: 0.7 MG/DL — SIGNIFICANT CHANGE UP (ref 0.7–1.5)
EOSINOPHIL # BLD AUTO: 0.19 K/UL — SIGNIFICANT CHANGE UP (ref 0–0.7)
EOSINOPHIL NFR BLD AUTO: 2.3 % — SIGNIFICANT CHANGE UP (ref 0–8)
GLUCOSE SERPL-MCNC: 142 MG/DL — HIGH (ref 70–99)
HCT VFR BLD CALC: 41.4 % — LOW (ref 42–52)
HGB BLD-MCNC: 14.2 G/DL — SIGNIFICANT CHANGE UP (ref 14–18)
IMM GRANULOCYTES NFR BLD AUTO: 1 % — HIGH (ref 0.1–0.3)
LYMPHOCYTES # BLD AUTO: 2.95 K/UL — SIGNIFICANT CHANGE UP (ref 1.2–3.4)
LYMPHOCYTES # BLD AUTO: 35.3 % — SIGNIFICANT CHANGE UP (ref 20.5–51.1)
MAGNESIUM SERPL-MCNC: 1.9 MG/DL — SIGNIFICANT CHANGE UP (ref 1.8–2.4)
MCHC RBC-ENTMCNC: 29.8 PG — SIGNIFICANT CHANGE UP (ref 27–31)
MCHC RBC-ENTMCNC: 34.3 G/DL — SIGNIFICANT CHANGE UP (ref 32–37)
MCV RBC AUTO: 86.8 FL — SIGNIFICANT CHANGE UP (ref 80–94)
MONOCYTES # BLD AUTO: 0.86 K/UL — HIGH (ref 0.1–0.6)
MONOCYTES NFR BLD AUTO: 10.3 % — HIGH (ref 1.7–9.3)
NEUTROPHILS # BLD AUTO: 4.26 K/UL — SIGNIFICANT CHANGE UP (ref 1.4–6.5)
NEUTROPHILS NFR BLD AUTO: 50.9 % — SIGNIFICANT CHANGE UP (ref 42.2–75.2)
NRBC # BLD: 0 /100 WBCS — SIGNIFICANT CHANGE UP (ref 0–0)
PLATELET # BLD AUTO: 158 K/UL — SIGNIFICANT CHANGE UP (ref 130–400)
POTASSIUM SERPL-MCNC: 4.3 MMOL/L — SIGNIFICANT CHANGE UP (ref 3.5–5)
POTASSIUM SERPL-SCNC: 4.3 MMOL/L — SIGNIFICANT CHANGE UP (ref 3.5–5)
PROT SERPL-MCNC: 6.3 G/DL — SIGNIFICANT CHANGE UP (ref 6–8)
RBC # BLD: 4.77 M/UL — SIGNIFICANT CHANGE UP (ref 4.7–6.1)
RBC # FLD: 14.1 % — SIGNIFICANT CHANGE UP (ref 11.5–14.5)
SARS-COV-2 RNA SPEC QL NAA+PROBE: SIGNIFICANT CHANGE UP
SODIUM SERPL-SCNC: 136 MMOL/L — SIGNIFICANT CHANGE UP (ref 135–146)
WBC # BLD: 8.36 K/UL — SIGNIFICANT CHANGE UP (ref 4.8–10.8)
WBC # FLD AUTO: 8.36 K/UL — SIGNIFICANT CHANGE UP (ref 4.8–10.8)

## 2020-07-09 PROCEDURE — 99232 SBSQ HOSP IP/OBS MODERATE 35: CPT

## 2020-07-09 RX ORDER — ZOLPIDEM TARTRATE 10 MG/1
5 TABLET ORAL ONCE
Refills: 0 | Status: DISCONTINUED | OUTPATIENT
Start: 2020-07-09 | End: 2020-07-10

## 2020-07-09 RX ADMIN — METHOCARBAMOL 750 MILLIGRAM(S): 500 TABLET, FILM COATED ORAL at 01:51

## 2020-07-09 RX ADMIN — Medication 1 TABLET(S): at 11:07

## 2020-07-09 RX ADMIN — METHOCARBAMOL 750 MILLIGRAM(S): 500 TABLET, FILM COATED ORAL at 11:07

## 2020-07-09 RX ADMIN — METHOCARBAMOL 750 MILLIGRAM(S): 500 TABLET, FILM COATED ORAL at 05:08

## 2020-07-09 RX ADMIN — SENNA PLUS 2 TABLET(S): 8.6 TABLET ORAL at 21:55

## 2020-07-09 RX ADMIN — AMLODIPINE BESYLATE 5 MILLIGRAM(S): 2.5 TABLET ORAL at 05:07

## 2020-07-09 RX ADMIN — METHOCARBAMOL 750 MILLIGRAM(S): 500 TABLET, FILM COATED ORAL at 17:17

## 2020-07-09 RX ADMIN — PANTOPRAZOLE SODIUM 40 MILLIGRAM(S): 20 TABLET, DELAYED RELEASE ORAL at 21:55

## 2020-07-09 NOTE — PROGRESS NOTE ADULT - SUBJECTIVE AND OBJECTIVE BOX
RAMIRO BETTENCOURT    Patient is a 50y old  Male who presents with a chief complaint of back pain, new onset DVT (08 Jul 2020 10:39)    INTERVAL HPI/OVERNIGHT EVENTS: no events overnight, pain is controlled. Pt participate in PT. No complaints.      ROS: All ROS negative except as documented above    PHYSICAL EXAM:  T(C): 36.5, Max: 36.5 (07-09-20 @ 07:49)  HR: 68 (68 - 88)  BP: 115/72 (115/72 - 129/79)  RR: 20 (18 - 20)  SpO2: 97% (96% - 97%)    GENERAL: NAD  NECK: Supple, No JVD  PULMONARY/CHEST: No rales, rhonchi, wheezing  CARDIOVASC: Regular rate and rhythm; No murmurs  GI/ABDOMEN: Soft, Nontender, Nondistended; Bowel sounds present  EXTREMITIES:  2+ Peripheral Pulses, No clubbing, cyanosis, or edema, no deformity. No calf tenderness b/l.  NERVOUS SYSTEM:  Alert & Oriented X3, no focal deficit     LABS:                        14.2   8.36  )-----------( 158      ( 09 Jul 2020 05:37 )             41.4     07-09    136  |  99  |  18  ----------------------------<  142<H>  4.3   |  23  |  0.7    Ca    8.7      09 Jul 2020 05:37  Mg     1.9     07-09    TPro  6.3  /  Alb  3.7  /  TBili  0.4  /  DBili  x   /  AST  23  /  ALT  65<H>  /  AlkPhos  81  07-09      RADIOLOGY & ADDITIONAL TESTS:  no new images      MEDICATIONS  (STANDING):  amLODIPine   Tablet 5 milliGRAM(s) Oral daily  apixaban 10 milliGRAM(s) Oral every 12 hours  chlorhexidine 4% Liquid 1 Application(s) Topical <User Schedule>  melatonin 1 milliGRAM(s) Oral at bedtime  methocarbamol 750 milliGRAM(s) Oral every 6 hours  multivitamin 1 Tablet(s) Oral daily  pantoprazole    Tablet 40 milliGRAM(s) Oral at bedtime  pantoprazole    Tablet 40 milliGRAM(s) Oral before breakfast  senna 2 Tablet(s) Oral at bedtime    MEDICATIONS  (PRN):  acetaminophen   Tablet .. 650 milliGRAM(s) Oral every 6 hours PRN Mild Pain (1 - 3)  oxycodone    5 mG/acetaminophen 325 mG 1 Tablet(s) Oral every 6 hours PRN Severe Pain (7 - 10)  polyethylene glycol 3350 17 Gram(s) Oral daily PRN Constipation  traMADol 25 milliGRAM(s) Oral every 8 hours PRN Moderate Pain (4 - 6)

## 2020-07-09 NOTE — PROGRESS NOTE ADULT - SUBJECTIVE AND OBJECTIVE BOX
RAMIRO BETTENCOURT 50y Male  MRN#: [id number]   CODE STATUS:________      SUBJECTIVE  Patient is a 50y old man who presented with a complaint of back pain which had been persistent for months with radiation to the lower extremities, R>L. In hospital neurosurgery performed microdiscectomy 6/28. Patient also has history of DVT in bilateral legs for which he received IVC filter; anti-coagulation being increased post-operation.     Today is hospital day 13. Patient examined at bedside.  Endorses left hip pain with slight radiati to leg but states not worse with movement of leg but with position of back. Denies numbness or sensory loss of left leg. States has had this pain prior to surgery with remission for a few days after surgery but again irritating. Neurosurgery aware.  Patient endorses anxiety at night; will give medication to facilitate sleep. No other complaints.    Present Today:           Samuels Catheter (X)No/ ()Yes? Indication:          Central Line (X)No/ ()Yes? Indication:          IV Fluids (X)No/ ()Yes? Type:  Rate:  Indication:      OBJECTIVE  PAST MEDICAL & SURGICAL HISTORY  Lumbar disc herniation  Kidney stones  HTN (hypertension)  Herniated lumbar intervertebral disc: discectomy  Kidney stone: ureteroscopy  Thrombus: thrombectomy of left leg    ALLERGIES:  No Known Allergies    MEDICATIONS:  STANDING MEDICATIONS  amLODIPine   Tablet 5 milliGRAM(s) Oral daily  apixaban 10 milliGRAM(s) Oral every 12 hours  chlorhexidine 4% Liquid 1 Application(s) Topical <User Schedule>  melatonin 1 milliGRAM(s) Oral at bedtime  methocarbamol 750 milliGRAM(s) Oral every 6 hours  multivitamin 1 Tablet(s) Oral daily  pantoprazole    Tablet 40 milliGRAM(s) Oral at bedtime  pantoprazole    Tablet 40 milliGRAM(s) Oral before breakfast  senna 2 Tablet(s) Oral at bedtime    PRN MEDICATIONS  acetaminophen   Tablet .. 650 milliGRAM(s) Oral every 6 hours PRN  oxycodone    5 mG/acetaminophen 325 mG 1 Tablet(s) Oral every 6 hours PRN  polyethylene glycol 3350 17 Gram(s) Oral daily PRN  traMADol 25 milliGRAM(s) Oral every 8 hours PRN      VITAL SIGNS: Last 24 Hours  T(C): 36.5 (09 Jul 2020 07:49), Max: 36.5 (09 Jul 2020 07:49)  T(F): 97.7 (09 Jul 2020 07:49), Max: 97.7 (09 Jul 2020 07:49)  HR: 68 (09 Jul 2020 07:49) (68 - 88)  BP: 115/72 (09 Jul 2020 07:49) (115/72 - 129/79)  BP(mean): --  RR: 20 (09 Jul 2020 07:49) (18 - 20)  SpO2: 97% (09 Jul 2020 07:49) (96% - 97%)    LABS:                        14.2   8.36  )-----------( 158      ( 09 Jul 2020 05:37 )             41.4     07-09    136  |  99  |  18  ----------------------------<  142<H>  4.3   |  23  |  0.7    Ca    8.7      09 Jul 2020 05:37  Mg     1.9     07-09    TPro  6.3  /  Alb  3.7  /  TBili  0.4  /  DBili  x   /  AST  23  /  ALT  65<H>  /  AlkPhos  81  07-09                  RADIOLOGY:      PHYSICAL EXAM:    GENERAL: NAD, well-developed, AAOx3  HEENT:  Atraumatic, Normocephalic. EOMI, PERRLA, conjunctiva and sclera clear, No JVD  PULMONARY: Clear to auscultation bilaterally; No wheeze  CARDIOVASCULAR: Regular rate and rhythm; No murmurs, rubs, or gallops  GASTROINTESTINAL: Soft, Nontender, Nondistended; Bowel sounds present  MUSCULOSKELETAL:  2+ Peripheral Pulses, ecchymoses of right lower extremity. Range of motion in lower extremities limited by pain but patient able to move them.   NEUROLOGY: non-focal  SKIN: No rashes or lesions      ASSESSMENT & PLAN    1.L1-L3 disk herniation/Spinal Cord Compression  -S/p Microdiscectomy 6/28, post-op day 9  -Pain controlled; PRN pain control  -Continue Robaxin 750 q6  -Continue apixaban 10mg BID  -PT    2. Left posterior tibial and right peroneal veins DVT  -S/p IVC filter 6/28 subcutaneous BID  -O/p with Schor for IVC filter removal  -On apixaban 10mg BID    3. Constipation likely from opioid medication   -Senna 2 tablets  -Miralax 17 grams q/day  -Encourage ambulation with PT    4. HTN  -Continue amlodipine 5mg q24    5.Hx of episodes of shortness of breath while sleeping  -Likely BRISA  -Needs outpatient sleep study  -Will     6. Anxiety/insomnia  -Patient endorsing anxiety and insomnia limiting his sleep.  -Currently on melatonin  -Giving one-time dose of zolpidem 5mg    #Diet: DASH    #Activity: As tolerated  #DVT Prophylaxis: Lovenox 30mg BID  #GI Prophylaxis: pantoprazole 40 mg q day  #Code Status: Full code   #Dispo: From home, pending placement

## 2020-07-09 NOTE — PROGRESS NOTE ADULT - ASSESSMENT
49 y/o male with PMH of kidney stones, HTN, lumbar disc herniation s/p discectomy in 2019, and venous stasis presents with c/o back pain for 6 months which has progressive worsened over the past month radiating to lower limbs (R>L), inability to ambulate due to leg weakness and constipation likely due to degenerative spine disease with cord compression, also new onset DVT    # LE weakness 2/2 acute spinal cord compression   - s/p L2-L3 discectomy 6/28  - completed dexamethasone taper per neurosx recommendation  - pain is controlled with current regimen   - c/w PT, pt will need rehab, he states his strength is better since surgery  - pt will have to f/u with NSx next week      # Bilateral acute DVT  - s/p IVC filter placement prior to surgery  - AC started after surgery once cleared by NSx  - OP f/u with vascular for IVC retrieval in 6-9 months      # Constipation  -cont with bowel regimen    # HTN - well controlled, cont lisinopril     FULL code    NO NEED FOR DAILY LABS    Pt is ready for discharge to SNF according to CM team dc planned for Friday. COVID-19 PCR neg on 7/8.

## 2020-07-10 ENCOUNTER — TRANSCRIPTION ENCOUNTER (OUTPATIENT)
Age: 50
End: 2020-07-10

## 2020-07-10 VITALS
TEMPERATURE: 97 F | HEART RATE: 77 BPM | RESPIRATION RATE: 18 BRPM | DIASTOLIC BLOOD PRESSURE: 76 MMHG | SYSTOLIC BLOOD PRESSURE: 132 MMHG

## 2020-07-10 PROCEDURE — 99239 HOSP IP/OBS DSCHRG MGMT >30: CPT

## 2020-07-10 RX ORDER — PANTOPRAZOLE SODIUM 20 MG/1
1 TABLET, DELAYED RELEASE ORAL
Qty: 0 | Refills: 0 | DISCHARGE
Start: 2020-07-10

## 2020-07-10 RX ORDER — TRAMADOL HYDROCHLORIDE 50 MG/1
0.5 TABLET ORAL
Qty: 0 | Refills: 0 | DISCHARGE
Start: 2020-07-10

## 2020-07-10 RX ORDER — APIXABAN 2.5 MG/1
2 TABLET, FILM COATED ORAL
Qty: 16 | Refills: 0
Start: 2020-07-10 | End: 2020-07-13

## 2020-07-10 RX ADMIN — Medication 1 TABLET(S): at 11:39

## 2020-07-10 RX ADMIN — METHOCARBAMOL 750 MILLIGRAM(S): 500 TABLET, FILM COATED ORAL at 05:05

## 2020-07-10 RX ADMIN — METHOCARBAMOL 750 MILLIGRAM(S): 500 TABLET, FILM COATED ORAL at 01:30

## 2020-07-10 RX ADMIN — CHLORHEXIDINE GLUCONATE 1 APPLICATION(S): 213 SOLUTION TOPICAL at 05:05

## 2020-07-10 RX ADMIN — AMLODIPINE BESYLATE 5 MILLIGRAM(S): 2.5 TABLET ORAL at 05:05

## 2020-07-10 RX ADMIN — METHOCARBAMOL 750 MILLIGRAM(S): 500 TABLET, FILM COATED ORAL at 11:39

## 2020-07-10 NOTE — PROGRESS NOTE ADULT - REASON FOR ADMISSION
back pain, new onset DVT

## 2020-07-10 NOTE — CHART NOTE - NSCHARTNOTESELECT_GEN_ALL_CORE
Malnutrition/Event Note
Discharge/Event Note
Event Note
Post op check/Event Note

## 2020-07-10 NOTE — PROGRESS NOTE ADULT - PROVIDER SPECIALTY LIST ADULT
Hospitalist
Internal Medicine
Neurosurgery
Physiatry
Vascular Surgery
Vascular Surgery
Hospitalist
Internal Medicine
Hospitalist
Internal Medicine
Internal Medicine
Neurosurgery
Hospitalist

## 2020-07-10 NOTE — DISCHARGE NOTE NURSING/CASE MANAGEMENT/SOCIAL WORK - PATIENT PORTAL LINK FT
You can access the FollowMyHealth Patient Portal offered by [facility] by registering at the following website: [email or web address]. By joining [facility]’s FollowMyHealth portal, you will also be able to view your health information using other applications (apps) compatible with our system.

## 2020-07-10 NOTE — PROGRESS NOTE ADULT - SUBJECTIVE AND OBJECTIVE BOX
RAMIRO BETTENCOURT  [facility] F4-4B 016 A ([facility] F4-4B)            Patient was evaluated and examined  by bedside, c/o b/l legs weakness          REVIEW OF SYSTEMS:  please see pertinent positives mentioned above, all other 12 ROS negative      T(C): , Max: 36.3 (07-09-20 @ 16:00)  HR: 77 (07-10-20 @ 10:08)  BP: 132/76 (07-10-20 @ 10:08)  RR: 18 (07-10-20 @ 10:08)  SpO2: --  CAPILLARY BLOOD GLUCOSE          PHYSICAL EXAM:  General: NAD, AAOX3, patient is laying comfortably in bed  HEENT: AT, NC, Supple, NO JVD, NO CB  Lungs: CTA B/L, no wheezing, no rhonchi  CVS: normal S1, S2, RRR, NO M/G/R  Abdomen: soft, bowel sounds present, non-tender, non-distended  Extremities: no edema, no clubbing, no cyanosis, positive peripheral pulses b/l  Neuro: b/l lower extremities motor weakness, sensory intact  Skin: no rash, no ecchymosis      LAB  CBC  Date: 07-09-20 @ 05:37  Mean cell Rcjmdbnjem12.8  Mean cell Hemoglobin Conc34.3  Mean cell Volum 86.8  Platelet count-Automate 158  RBC Count 4.77  Red Cell Distrib Width14.1  WBC Count8.36  % Albumin, Urine--  Hematocrit 41.4  Hemoglobin 14.2  CBC  Date: 07-07-20 @ 07:31  Mean cell Wzklgjzpkt10.4  Mean cell Hemoglobin Conc33.5  Mean cell Volum 84.7  Platelet count-Automate 183  RBC Count 5.36  Red Cell Distrib Width14.1  WBC Count10.75  % Albumin, Urine--  Hematocrit 45.4  Hemoglobin 15.2  CBC  Date: 07-04-20 @ 05:59  Mean cell Wslizkwdbn62.8  Mean cell Hemoglobin Conc33.8  Mean cell Volum 85.3  Platelet count-Automate 232  RBC Count 5.03  Red Cell Distrib Width13.9  WBC Count14.14  % Albumin, Urine--  Hematocrit 42.9  Hemoglobin 14.5    BMP  07-09-20 @ 05:37  Blood Gas Arterial-Calcium,Ionized--  Blood Urea Nitrogen, Serum 18 mg/dL [10 - 20]  Carbon Dioxide, Serum23 mmol/L [17 - 32]  Chloride, Serum99 mmol/L [98 - 110]  Creatinie, Serum0.7 mg/dL [0.7 - 1.5]  Glucose, Sqaeg586 mg/dL<H> [70 - 99]  Potassium, Serum4.3 mmol/L [3.5 - 5.0]  Sodium, Serum 136 mmol/L [135 - 146]  BMP  07-07-20 @ 07:31  Blood Gas Arterial-Calcium,Ionized--  Blood Urea Nitrogen, Serum 14 mg/dL [10 - 20]  Carbon Dioxide, Serum28 mmol/L [17 - 32]  Chloride, Serum96 mmol/L<L> [98 - 110]  Creatinie, Serum0.7 mg/dL [0.7 - 1.5]  Glucose, Zbdpi795 mg/dL<H> [70 - 99]  Potassium, Serum4.4 mmol/L [3.5 - 5.0]  Sodium, Serum 136 mmol/L [135 - 146]              Microbiology:    Culture - Urine (collected 06-26-20 @ 16:10)  Source: .Urine Clean Catch (Midstream)  Final Report (06-27-20 @ 18:06):    <10,000 CFU/mL Normal Urogenital Heather        Medications:  acetaminophen   Tablet .. 650 milliGRAM(s) Oral every 6 hours PRN  amLODIPine   Tablet 5 milliGRAM(s) Oral daily  apixaban 10 milliGRAM(s) Oral every 12 hours  chlorhexidine 4% Liquid 1 Application(s) Topical <User Schedule>  melatonin 1 milliGRAM(s) Oral at bedtime  methocarbamol 750 milliGRAM(s) Oral every 6 hours  multivitamin 1 Tablet(s) Oral daily  oxycodone    5 mG/acetaminophen 325 mG 1 Tablet(s) Oral every 6 hours PRN  pantoprazole    Tablet 40 milliGRAM(s) Oral at bedtime  pantoprazole    Tablet 40 milliGRAM(s) Oral before breakfast  polyethylene glycol 3350 17 Gram(s) Oral daily PRN  senna 2 Tablet(s) Oral at bedtime  traMADol 25 milliGRAM(s) Oral every 8 hours PRN  zolpidem 5 milliGRAM(s) Oral once PRN        Assessment and Plan:  49 y/o male with PMH of kidney stones, HTN, lumbar disc herniation s/p discectomy in 2019, and venous stasis presents with c/o back pain for 6 months which has progressive worsened over the past month radiating to lower limbs (R>L), inability to ambulate due to leg weakness and constipation likely due to degenerative spine disease with cord compression, also new onset DVT    # LE weakness 2/2 acute spinal cord compression   - s/p L2-L3 discectomy 6/28  - completed dexamethasone taper per neurosx recommendation  - pain is controlled with current regimen   - c/w PT, pt will need rehab, he states his strength is better since surgery  - pt will have to f/u with NSx next week      # Bilateral acute DVT  - s/p IVC filter placement prior to surgery  - AC started after surgery once cleared by NSx  - OP f/u with vascular for IVC retrieval in 6-9 months      # Constipation  -cont with bowel regimen    # HTN - well controlled, cont lisinopril     FULL code    NO NEED FOR DAILY LABS    Pt is ready for discharge to SNF according to CM team dc planned for Friday. COVID-19 PCR neg on 7/8. RAMIRO BETTENCOURT  [facility]N F4-4B 016 A ([facility] F4-4[facility])            Patient was evaluated and examined  by bedside, c/o b/l legs weakness          REVIEW OF SYSTEMS:  please see pertinent positives mentioned above, all other 12 ROS negative      T(C): , Max: 36.3 (07-09-20 @ 16:00)  HR: 77 (07-10-20 @ 10:08)  BP: 132/76 (07-10-20 @ 10:08)  RR: 18 (07-10-20 @ 10:08)  SpO2: --  CAPILLARY BLOOD GLUCOSE          PHYSICAL EXAM:  General: NAD, AAOX3, patient is laying comfortably in bed  HEENT: AT, NC, Supple, NO JVD, NO CB  Lungs: CTA B/L, no wheezing, no rhonchi  CVS: normal S1, S2, RRR, NO M/G/R  Abdomen: soft, bowel sounds present, non-tender, non-distended  Extremities: no edema, no clubbing, no cyanosis, positive peripheral pulses b/l  Neuro: b/l lower extremities motor weakness, sensory intact  Skin: no rash, no ecchymosis      LAB  CBC  Date: 07-09-20 @ 05:37  Mean cell Pcsvwtwybd29.8  Mean cell Hemoglobin Conc34.3  Mean cell Volum 86.8  Platelet count-Automate 158  RBC Count 4.77  Red Cell Distrib Width14.1  WBC Count8.36  % Albumin, Urine--  Hematocrit 41.4  Hemoglobin 14.2  CBC  Date: 07-07-20 @ 07:31  Mean cell Nhirwveowi73.4  Mean cell Hemoglobin Conc33.5  Mean cell Volum 84.7  Platelet count-Automate 183  RBC Count 5.36  Red Cell Distrib Width14.1  WBC Count10.75  % Albumin, Urine--  Hematocrit 45.4  Hemoglobin 15.2  CBC  Date: 07-04-20 @ 05:59  Mean cell Qdtdhclhpi79.8  Mean cell Hemoglobin Conc33.8  Mean cell Volum 85.3  Platelet count-Automate 232  RBC Count 5.03  Red Cell Distrib Width13.9  WBC Count14.14  % Albumin, Urine--  Hematocrit 42.9  Hemoglobin 14.5    BMP  07-09-20 @ 05:37  Blood Gas Arterial-Calcium,Ionized--  Blood Urea Nitrogen, Serum 18 mg/dL [10 - 20]  Carbon Dioxide, Serum23 mmol/L [17 - 32]  Chloride, Serum99 mmol/L [98 - 110]  Creatinie, Serum0.7 mg/dL [0.7 - 1.5]  Glucose, Qumjm925 mg/dL<H> [70 - 99]  Potassium, Serum4.3 mmol/L [3.5 - 5.0]  Sodium, Serum 136 mmol/L [135 - 146]  BMP  07-07-20 @ 07:31  Blood Gas Arterial-Calcium,Ionized--  Blood Urea Nitrogen, Serum 14 mg/dL [10 - 20]  Carbon Dioxide, Serum28 mmol/L [17 - 32]  Chloride, Serum96 mmol/L<L> [98 - 110]  Creatinie, Serum0.7 mg/dL [0.7 - 1.5]  Glucose, Pqmnn462 mg/dL<H> [70 - 99]  Potassium, Serum4.4 mmol/L [3.5 - 5.0]  Sodium, Serum 136 mmol/L [135 - 146]              Microbiology:    Culture - Urine (collected 06-26-20 @ 16:10)  Source: .Urine Clean Catch (Midstream)  Final Report (06-27-20 @ 18:06):    <10,000 CFU/mL Normal Urogenital Heather        Medications:  acetaminophen   Tablet .. 650 milliGRAM(s) Oral every 6 hours PRN  amLODIPine   Tablet 5 milliGRAM(s) Oral daily  apixaban 10 milliGRAM(s) Oral every 12 hours  chlorhexidine 4% Liquid 1 Application(s) Topical <User Schedule>  melatonin 1 milliGRAM(s) Oral at bedtime  methocarbamol 750 milliGRAM(s) Oral every 6 hours  multivitamin 1 Tablet(s) Oral daily  oxycodone    5 mG/acetaminophen 325 mG 1 Tablet(s) Oral every 6 hours PRN  pantoprazole    Tablet 40 milliGRAM(s) Oral at bedtime  pantoprazole    Tablet 40 milliGRAM(s) Oral before breakfast  polyethylene glycol 3350 17 Gram(s) Oral daily PRN  senna 2 Tablet(s) Oral at bedtime  traMADol 25 milliGRAM(s) Oral every 8 hours PRN  zolpidem 5 milliGRAM(s) Oral once PRN        Assessment and Plan:  49 y/o male with PMH of kidney stones, HTN, lumbar disc herniation s/p discectomy in 2019, and venous stasis presents with c/o back pain for 6 months which has progressive worsened over the past month radiating to lower limbs (R>L), inability to ambulate due to leg weakness and constipation likely due to degenerative spine disease with cord compression, also new onset DVT    # LE weakness 2/2 acute spinal cord compression   - s/p L2-L3 discectomy 6/28  - completed dexamethasone taper per neurosx recommendation  - pain is controlled with current regimen   - c/w PT, pt will need rehab, he states his strength is better since surgery  - pt was instructed to f/up in 1 week with Neurosurgery specialist  post d/c home.     # Bilateral acute DVT  - s/p IVC filter placement prior to surgery  - AC started after surgery once cleared by NSx  - OP f/u with vascular for IVC retrieval in 6-9 months      # Constipation  -cont with bowel regimen    # HTN - well controlled, cont lisinopril     FULL code      Pt is ready for discharge to SNF today.

## 2020-07-10 NOTE — PROGRESS NOTE ADULT - NSHPATTENDINGPLANDISCUSS_GEN_ALL_CORE
above
residents, nursing, , patient
above
above
house staff
residents, nursing, , patient

## 2020-07-15 RX ORDER — APIXABAN 2.5 MG/1
1 TABLET, FILM COATED ORAL
Qty: 0 | Refills: 0 | DISCHARGE
Start: 2020-07-15

## 2020-07-16 DIAGNOSIS — E66.01 MORBID (SEVERE) OBESITY DUE TO EXCESS CALORIES: ICD-10-CM

## 2020-07-16 DIAGNOSIS — M54.9 DORSALGIA, UNSPECIFIED: ICD-10-CM

## 2020-07-16 DIAGNOSIS — G83.4 CAUDA EQUINA SYNDROME: ICD-10-CM

## 2020-07-16 DIAGNOSIS — K59.00 CONSTIPATION, UNSPECIFIED: ICD-10-CM

## 2020-07-16 DIAGNOSIS — E43 UNSPECIFIED SEVERE PROTEIN-CALORIE MALNUTRITION: ICD-10-CM

## 2020-07-16 DIAGNOSIS — M51.06 INTERVERTEBRAL DISC DISORDERS WITH MYELOPATHY, LUMBAR REGION: ICD-10-CM

## 2020-07-16 DIAGNOSIS — R53.2 FUNCTIONAL QUADRIPLEGIA: ICD-10-CM

## 2020-07-16 DIAGNOSIS — I82.451 ACUTE EMBOLISM AND THROMBOSIS OF RIGHT PERONEAL VEIN: ICD-10-CM

## 2020-07-16 DIAGNOSIS — M21.372 FOOT DROP, LEFT FOOT: ICD-10-CM

## 2020-07-16 DIAGNOSIS — G47.33 OBSTRUCTIVE SLEEP APNEA (ADULT) (PEDIATRIC): ICD-10-CM

## 2020-07-16 DIAGNOSIS — K59.03 DRUG INDUCED CONSTIPATION: ICD-10-CM

## 2020-07-16 DIAGNOSIS — T40.2X5A ADVERSE EFFECT OF OTHER OPIOIDS, INITIAL ENCOUNTER: ICD-10-CM

## 2020-07-16 DIAGNOSIS — I10 ESSENTIAL (PRIMARY) HYPERTENSION: ICD-10-CM

## 2020-07-16 DIAGNOSIS — G89.29 OTHER CHRONIC PAIN: ICD-10-CM

## 2020-07-16 DIAGNOSIS — I82.442 ACUTE EMBOLISM AND THROMBOSIS OF LEFT TIBIAL VEIN: ICD-10-CM

## 2020-07-16 DIAGNOSIS — F41.9 ANXIETY DISORDER, UNSPECIFIED: ICD-10-CM

## 2020-07-16 DIAGNOSIS — Z87.891 PERSONAL HISTORY OF NICOTINE DEPENDENCE: ICD-10-CM

## 2020-07-16 DIAGNOSIS — G47.00 INSOMNIA, UNSPECIFIED: ICD-10-CM

## 2020-07-16 DIAGNOSIS — E87.5 HYPERKALEMIA: ICD-10-CM

## 2020-07-20 ENCOUNTER — APPOINTMENT (OUTPATIENT)
Dept: NEUROSURGERY | Facility: CLINIC | Age: 50
End: 2020-07-20
Payer: SUBSIDIZED

## 2020-07-20 DIAGNOSIS — M48.061 SPINAL STENOSIS, LUMBAR REGION WITHOUT NEUROGENIC CLAUDICATION: ICD-10-CM

## 2020-07-20 PROCEDURE — 99024 POSTOP FOLLOW-UP VISIT: CPT

## 2020-07-24 PROBLEM — M48.061 LUMBAR STENOSIS: Status: ACTIVE | Noted: 2020-07-24

## 2020-07-24 NOTE — HISTORY OF PRESENT ILLNESS
[de-identified] : s/p lumbar discectomy for cauda equina paraparesis. Pt presented with bilateral complete foot drop and proximal weakness left more than right. He is at home now. he has had significant improvement at this time. Left leg proximal is now 3-4/5, dist has some toes flexion extension, right leg proximally, KE.KF 2-3/5, dist has toe flexion extension. numbness is improving also. incision is c/d/i. Pt with no insurance. We have recommended Pt and referral given. he will also do his own PT at home. I will see him back in 4 weeks. he is aware that progress will be slow and his ultimate outcome is unknown at this time. He was given bilateral AFO in the hospital.

## 2020-07-27 ENCOUNTER — INPATIENT (INPATIENT)
Facility: HOSPITAL | Age: 50
LOS: 1 days | Discharge: HOME | End: 2020-07-29
Attending: INTERNAL MEDICINE | Admitting: INTERNAL MEDICINE
Payer: SELF-PAY

## 2020-07-27 VITALS
OXYGEN SATURATION: 97 % | SYSTOLIC BLOOD PRESSURE: 139 MMHG | HEART RATE: 73 BPM | RESPIRATION RATE: 18 BRPM | DIASTOLIC BLOOD PRESSURE: 65 MMHG | TEMPERATURE: 96 F

## 2020-07-27 DIAGNOSIS — M51.26 OTHER INTERVERTEBRAL DISC DISPLACEMENT, LUMBAR REGION: Chronic | ICD-10-CM

## 2020-07-27 DIAGNOSIS — N20.0 CALCULUS OF KIDNEY: Chronic | ICD-10-CM

## 2020-07-27 DIAGNOSIS — I82.90 ACUTE EMBOLISM AND THROMBOSIS OF UNSPECIFIED VEIN: Chronic | ICD-10-CM

## 2020-07-27 LAB
ALBUMIN SERPL ELPH-MCNC: 4.4 G/DL — SIGNIFICANT CHANGE UP (ref 3.5–5.2)
ALP SERPL-CCNC: 82 U/L — SIGNIFICANT CHANGE UP (ref 30–115)
ALT FLD-CCNC: 23 U/L — SIGNIFICANT CHANGE UP (ref 0–41)
ANION GAP SERPL CALC-SCNC: 12 MMOL/L — SIGNIFICANT CHANGE UP (ref 7–14)
APTT BLD: 34 SEC — SIGNIFICANT CHANGE UP (ref 27–39.2)
AST SERPL-CCNC: 16 U/L — SIGNIFICANT CHANGE UP (ref 0–41)
BASE EXCESS BLDV CALC-SCNC: 1.3 MMOL/L — SIGNIFICANT CHANGE UP (ref -2–2)
BASOPHILS # BLD AUTO: 0.04 K/UL — SIGNIFICANT CHANGE UP (ref 0–0.2)
BASOPHILS NFR BLD AUTO: 0.5 % — SIGNIFICANT CHANGE UP (ref 0–1)
BILIRUB SERPL-MCNC: 0.5 MG/DL — SIGNIFICANT CHANGE UP (ref 0.2–1.2)
BUN SERPL-MCNC: 9 MG/DL — LOW (ref 10–20)
CA-I SERPL-SCNC: 1.08 MMOL/L — LOW (ref 1.12–1.3)
CALCIUM SERPL-MCNC: 9.1 MG/DL — SIGNIFICANT CHANGE UP (ref 8.5–10.1)
CHLORIDE SERPL-SCNC: 107 MMOL/L — SIGNIFICANT CHANGE UP (ref 98–110)
CO2 SERPL-SCNC: 25 MMOL/L — SIGNIFICANT CHANGE UP (ref 17–32)
CREAT SERPL-MCNC: 0.8 MG/DL — SIGNIFICANT CHANGE UP (ref 0.7–1.5)
EOSINOPHIL # BLD AUTO: 0.23 K/UL — SIGNIFICANT CHANGE UP (ref 0–0.7)
EOSINOPHIL NFR BLD AUTO: 2.9 % — SIGNIFICANT CHANGE UP (ref 0–8)
GAS PNL BLDV: 138 MMOL/L — SIGNIFICANT CHANGE UP (ref 136–145)
GAS PNL BLDV: SIGNIFICANT CHANGE UP
GLUCOSE SERPL-MCNC: 117 MG/DL — HIGH (ref 70–99)
HCO3 BLDV-SCNC: 26 MMOL/L — SIGNIFICANT CHANGE UP (ref 22–29)
HCT VFR BLD CALC: 40.8 % — LOW (ref 42–52)
HCT VFR BLDA CALC: 43.1 % — SIGNIFICANT CHANGE UP (ref 34–44)
HGB BLD CALC-MCNC: 14.1 G/DL — SIGNIFICANT CHANGE UP (ref 14–18)
HGB BLD-MCNC: 13.8 G/DL — LOW (ref 14–18)
IMM GRANULOCYTES NFR BLD AUTO: 0.5 % — HIGH (ref 0.1–0.3)
INR BLD: 1.37 RATIO — HIGH (ref 0.65–1.3)
LACTATE BLDV-MCNC: 0.8 MMOL/L — SIGNIFICANT CHANGE UP (ref 0.5–1.6)
LYMPHOCYTES # BLD AUTO: 2.98 K/UL — SIGNIFICANT CHANGE UP (ref 1.2–3.4)
LYMPHOCYTES # BLD AUTO: 38.1 % — SIGNIFICANT CHANGE UP (ref 20.5–51.1)
MCHC RBC-ENTMCNC: 28.8 PG — SIGNIFICANT CHANGE UP (ref 27–31)
MCHC RBC-ENTMCNC: 33.8 G/DL — SIGNIFICANT CHANGE UP (ref 32–37)
MCV RBC AUTO: 85 FL — SIGNIFICANT CHANGE UP (ref 80–94)
MONOCYTES # BLD AUTO: 0.65 K/UL — HIGH (ref 0.1–0.6)
MONOCYTES NFR BLD AUTO: 8.3 % — SIGNIFICANT CHANGE UP (ref 1.7–9.3)
NEUTROPHILS # BLD AUTO: 3.89 K/UL — SIGNIFICANT CHANGE UP (ref 1.4–6.5)
NEUTROPHILS NFR BLD AUTO: 49.7 % — SIGNIFICANT CHANGE UP (ref 42.2–75.2)
NRBC # BLD: 0 /100 WBCS — SIGNIFICANT CHANGE UP (ref 0–0)
NT-PROBNP SERPL-SCNC: 37 PG/ML — SIGNIFICANT CHANGE UP (ref 0–300)
PCO2 BLDV: 42 MMHG — SIGNIFICANT CHANGE UP (ref 41–51)
PH BLDV: 7.41 — SIGNIFICANT CHANGE UP (ref 7.26–7.43)
PLATELET # BLD AUTO: 232 K/UL — SIGNIFICANT CHANGE UP (ref 130–400)
PO2 BLDV: 50 MMHG — HIGH (ref 20–40)
POTASSIUM BLDV-SCNC: 3.6 MMOL/L — SIGNIFICANT CHANGE UP (ref 3.3–5.6)
POTASSIUM SERPL-MCNC: 3.8 MMOL/L — SIGNIFICANT CHANGE UP (ref 3.5–5)
POTASSIUM SERPL-SCNC: 3.8 MMOL/L — SIGNIFICANT CHANGE UP (ref 3.5–5)
PROT SERPL-MCNC: 7.3 G/DL — SIGNIFICANT CHANGE UP (ref 6–8)
PROTHROM AB SERPL-ACNC: 15.8 SEC — HIGH (ref 9.95–12.87)
RBC # BLD: 4.8 M/UL — SIGNIFICANT CHANGE UP (ref 4.7–6.1)
RBC # FLD: 13.5 % — SIGNIFICANT CHANGE UP (ref 11.5–14.5)
SAO2 % BLDV: 85 % — SIGNIFICANT CHANGE UP
SODIUM SERPL-SCNC: 144 MMOL/L — SIGNIFICANT CHANGE UP (ref 135–146)
TROPONIN T SERPL-MCNC: <0.01 NG/ML — SIGNIFICANT CHANGE UP
TROPONIN T SERPL-MCNC: <0.01 NG/ML — SIGNIFICANT CHANGE UP
WBC # BLD: 7.83 K/UL — SIGNIFICANT CHANGE UP (ref 4.8–10.8)
WBC # FLD AUTO: 7.83 K/UL — SIGNIFICANT CHANGE UP (ref 4.8–10.8)

## 2020-07-27 PROCEDURE — 93306 TTE W/DOPPLER COMPLETE: CPT | Mod: 26

## 2020-07-27 PROCEDURE — 71045 X-RAY EXAM CHEST 1 VIEW: CPT | Mod: 26

## 2020-07-27 PROCEDURE — 75574 CT ANGIO HRT W/3D IMAGE: CPT | Mod: 26

## 2020-07-27 PROCEDURE — 93010 ELECTROCARDIOGRAM REPORT: CPT

## 2020-07-27 PROCEDURE — 93010 ELECTROCARDIOGRAM REPORT: CPT | Mod: 77

## 2020-07-27 PROCEDURE — 99220: CPT

## 2020-07-27 PROCEDURE — 93970 EXTREMITY STUDY: CPT | Mod: 26

## 2020-07-27 RX ORDER — OXYCODONE AND ACETAMINOPHEN 5; 325 MG/1; MG/1
1 TABLET ORAL EVERY 6 HOURS
Refills: 0 | Status: DISCONTINUED | OUTPATIENT
Start: 2020-07-27 | End: 2020-07-29

## 2020-07-27 RX ORDER — METOPROLOL TARTRATE 50 MG
25 TABLET ORAL
Refills: 0 | Status: DISCONTINUED | OUTPATIENT
Start: 2020-07-28 | End: 2020-07-29

## 2020-07-27 RX ORDER — METOPROLOL TARTRATE 50 MG
50 TABLET ORAL ONCE
Refills: 0 | Status: DISCONTINUED | OUTPATIENT
Start: 2020-07-27 | End: 2020-07-27

## 2020-07-27 RX ORDER — TAMSULOSIN HYDROCHLORIDE 0.4 MG/1
0.4 CAPSULE ORAL AT BEDTIME
Refills: 0 | Status: DISCONTINUED | OUTPATIENT
Start: 2020-07-27 | End: 2020-07-29

## 2020-07-27 RX ORDER — POLYETHYLENE GLYCOL 3350 17 G/17G
17 POWDER, FOR SOLUTION ORAL DAILY
Refills: 0 | Status: DISCONTINUED | OUTPATIENT
Start: 2020-07-27 | End: 2020-07-29

## 2020-07-27 RX ORDER — ASPIRIN/CALCIUM CARB/MAGNESIUM 324 MG
325 TABLET ORAL ONCE
Refills: 0 | Status: COMPLETED | OUTPATIENT
Start: 2020-07-27 | End: 2020-07-27

## 2020-07-27 RX ORDER — METOPROLOL TARTRATE 50 MG
100 TABLET ORAL ONCE
Refills: 0 | Status: COMPLETED | OUTPATIENT
Start: 2020-07-27 | End: 2020-07-27

## 2020-07-27 RX ORDER — LISINOPRIL 2.5 MG/1
5 TABLET ORAL DAILY
Refills: 0 | Status: DISCONTINUED | OUTPATIENT
Start: 2020-07-27 | End: 2020-07-29

## 2020-07-27 RX ORDER — METOPROLOL TARTRATE 50 MG
5 TABLET ORAL ONCE
Refills: 0 | Status: COMPLETED | OUTPATIENT
Start: 2020-07-27 | End: 2020-07-27

## 2020-07-27 RX ORDER — METOPROLOL TARTRATE 50 MG
100 TABLET ORAL ONCE
Refills: 0 | Status: DISCONTINUED | OUTPATIENT
Start: 2020-07-27 | End: 2020-07-27

## 2020-07-27 RX ORDER — ATORVASTATIN CALCIUM 80 MG/1
40 TABLET, FILM COATED ORAL AT BEDTIME
Refills: 0 | Status: DISCONTINUED | OUTPATIENT
Start: 2020-07-27 | End: 2020-07-29

## 2020-07-27 RX ORDER — METHOCARBAMOL 500 MG/1
750 TABLET, FILM COATED ORAL EVERY 6 HOURS
Refills: 0 | Status: DISCONTINUED | OUTPATIENT
Start: 2020-07-27 | End: 2020-07-29

## 2020-07-27 RX ORDER — TRAMADOL HYDROCHLORIDE 50 MG/1
25 TABLET ORAL EVERY 8 HOURS
Refills: 0 | Status: DISCONTINUED | OUTPATIENT
Start: 2020-07-27 | End: 2020-07-29

## 2020-07-27 RX ORDER — PANTOPRAZOLE SODIUM 20 MG/1
40 TABLET, DELAYED RELEASE ORAL
Refills: 0 | Status: DISCONTINUED | OUTPATIENT
Start: 2020-07-27 | End: 2020-07-29

## 2020-07-27 RX ORDER — APIXABAN 2.5 MG/1
5 TABLET, FILM COATED ORAL EVERY 12 HOURS
Refills: 0 | Status: DISCONTINUED | OUTPATIENT
Start: 2020-07-27 | End: 2020-07-29

## 2020-07-27 RX ORDER — SENNA PLUS 8.6 MG/1
2 TABLET ORAL AT BEDTIME
Refills: 0 | Status: DISCONTINUED | OUTPATIENT
Start: 2020-07-27 | End: 2020-07-29

## 2020-07-27 RX ORDER — ASPIRIN/CALCIUM CARB/MAGNESIUM 324 MG
81 TABLET ORAL DAILY
Refills: 0 | Status: DISCONTINUED | OUTPATIENT
Start: 2020-07-28 | End: 2020-07-29

## 2020-07-27 RX ORDER — FUROSEMIDE 40 MG
40 TABLET ORAL DAILY
Refills: 0 | Status: DISCONTINUED | OUTPATIENT
Start: 2020-07-27 | End: 2020-07-29

## 2020-07-27 RX ORDER — AMLODIPINE BESYLATE 2.5 MG/1
5 TABLET ORAL DAILY
Refills: 0 | Status: DISCONTINUED | OUTPATIENT
Start: 2020-07-27 | End: 2020-07-29

## 2020-07-27 RX ADMIN — Medication 325 MILLIGRAM(S): at 17:10

## 2020-07-27 RX ADMIN — APIXABAN 5 MILLIGRAM(S): 2.5 TABLET, FILM COATED ORAL at 21:36

## 2020-07-27 RX ADMIN — ATORVASTATIN CALCIUM 40 MILLIGRAM(S): 80 TABLET, FILM COATED ORAL at 21:38

## 2020-07-27 RX ADMIN — Medication 100 MILLIGRAM(S): at 10:33

## 2020-07-27 RX ADMIN — Medication 5 MILLIGRAM(S): at 12:34

## 2020-07-27 RX ADMIN — TAMSULOSIN HYDROCHLORIDE 0.4 MILLIGRAM(S): 0.4 CAPSULE ORAL at 21:36

## 2020-07-27 NOTE — ED PROVIDER NOTE - OBJECTIVE STATEMENT
The patient is a 50 year old male with a history of microdiscectomy for L2-3 disc herniation, R peroneal and left DVT s/p IVC filter last month and on Eliquis, presenting for b/l LE swelling for the past ~4 days. Symptoms associated with shortness of breath with exertion. No orthopnea, no chest pain, no diaphoresis.

## 2020-07-27 NOTE — ED PROVIDER NOTE - NS ED ROS FT
Eyes:  No visual changes, eye pain or discharge.  ENMT:  No hearing changes, pain, discharge or infections. No neck pain or stiffness.  Cardiac:  No chest pain, SOB or edema. No chest pain with exertion.  Respiratory:  No cough or respiratory distress. No hemoptysis.  GI:  No nausea, vomiting, diarrhea or abdominal pain.  :  No dysuria, frequency or burning.  MS:  +left calf pain. lower extremity swelling.   Neuro:  No headache or weakness.  No LOC.  Skin:  No skin rash.   Endocrine: No history of thyroid disease or diabetes.

## 2020-07-27 NOTE — ED PROVIDER NOTE - CARE PLAN
Assessment and plan of treatment:	Plan: Monitor, EKG, CXR, labs, venous b/l LE duplex, reassess. Principal Discharge DX:	SOB (shortness of breath) on exertion  Assessment and plan of treatment:	Plan: Monitor, EKG, CXR, labs, venous b/l LE duplex, reassess.  Secondary Diagnosis:	Lower extremity edema

## 2020-07-27 NOTE — H&P ADULT - NSHPPHYSICALEXAM_GEN_ALL_CORE
Constitutional: Male pt sitting on stretcher speaking full sentences.   Integumentary: No cyanosis. No rash. Chronic LE b/l venous stasis.   ENT: MMM NECK: Supple, non-tender, no meningeal signs.   Cardiovascular: RRR, DP and pt pulses 2/4 b/l.  radial pulses 2/4 b/l. No JVD.   Respiratory: BS present b/l, ctabl, no wheezing or crackles, no accessory muscle use, no stridor.   Gastrointestinal: BS present throughout all 4 quadrants, soft, nd, nt, no rebound tenderness or guarding, no cvat.   Musculoskeletal: FROM, 2+ pitting LE edema extending above b/l ankles, (+) L calf pain. No erythema. Neurologic: AAOx3, motor 5/5 and sensation intact throughout upper and lower ext, CN II-XII intact, No facial droop or slurring of speech. No focal deficits.

## 2020-07-27 NOTE — CONSULT NOTE ADULT - ASSESSMENT
51 y/o m w/ PMH of microdiscectomy 6/28 due to MRI showing L2-3 disc herniation, R peroneal and L PT DVT  s/p IVC filter on 6/28 on [facility] (admission from 6/26 to July 20th), Dr. Wu vascular presents with b/l LE worsening swelling x 4 days, associated with sob worse on exertion, orthopnea.    IMPRESSION:     New onset dyspnea, orthopnea, r/o CHF  Noncalcified plaque within the second diagonal branch contributing to severe stenosis.    RECOMMENDATIONS:    Check TTE  ASA, Statin,  BB, ACE   Likely Cath tomorrow     ATTENDING RECS TO FOLLOW 51 y/o m w/ PMH of microdiscectomy 6/28 due to MRI showing L2-3 disc herniation, R peroneal and L PT DVT  [facility] Dr. Wu vascular presents with b/l LE worsening swelling x 4 days, associated with sob worse on exertion, orthopnea.    IMPRESSION:     New onset dyspnea, orthopnea, r/o CHF vs. PE  CAD, Noncalcified plaque within the second diagonal branch contributing to severe stenosis    RECOMMENDATIONS:    Check TTE, CTA to rule out PE  Start ASA, Statin 49 y/o m w/ PMH of microdiscectomy 6/28 due to MRI showing L2-3 disc herniation, R peroneal and L PT DVT  s/p IVC filter on 6/28 on [facility] (admission from 6/26 to July 20th), Dr. Wu vascular presents with b/l LE worsening swelling x 4 days, associated with sob worse on exertion, orthopnea.    IMPRESSION:     New onset dyspnea, orthopnea, r/o CHF vs. PE  CAD, Noncalcified plaque within the second diagonal branch contributing to severe stenosis    RECOMMENDATIONS:    Check TTE  CTA to rule out PE  Lasix prn for LE swelling   Start ASA, Statin

## 2020-07-27 NOTE — ED PROVIDER NOTE - ATTENDING CONTRIBUTION TO CARE
51 y/o m w/ pmhx of microdiscectomy 6/28 due to MRI showing L2-3 disc herniation, R peroneal and L PT DVT  s/p IVC filter on 6/28 on [facility] (admission from 6/26 to July 20th), Dr. Wu vascular presents with 49 y/o m w/ pmhx of microdiscectomy 6/28 due to MRI showing L2-3 disc herniation, R peroneal and L PT DVT  s/p IVC filter on 6/28 on [facility] (admission from 6/26 to July 20th), Dr. Wu vascular presents with b/l LE worsening selling x 4 days, associated with sob worse on exertion, orthopnea. 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 No fever, chills, n/v, cp,  pleuritic cp, palpitations, diaphoresis, cough, ha/lh/dizziness, numbness/tingling, neck pain/ stiffness, abd pain, diarrhea, constipation, melena/brbpr, urinary symptoms, trauma, weakness, sick contacts, recent travel or rash.    Vital Signs: I have reviewed the initial vital signs. Constitutional: Male pt sitting on stretcher speaking full sentences. Integumentary: No cyanosis. No rash. Chronic LE b/l venous stasis. ENT: MMM NECK: Supple, non-tender, no meningeal signs. Cardiovascular: RRR, DP and pt pulses 2/4 b/l.  radial pulses 2/4 b/l. No JVD. Respiratory: BS present b/l, ctabl, no wheezing or crackles, no accessory muscle use, no stridor. Gastrointestinal: BS present throughout all 4 quadrants, soft, nd, nt, no rebound tenderness or guarding, no cvat. Musculoskeletal: FROM, 2+ pitting LE edema extending above b/l ankles, (+) L calf pain. No erythema. Neurologic: AAOx3, motor 5/5 and sensation intact throughout upper and lower ext, CN II-XII intact, No facial droop or slurring of speech. No focal deficits.

## 2020-07-27 NOTE — ED PROVIDER NOTE - PHYSICAL EXAMINATION
CONSTITUTIONAL: Well-developed; well-nourished; in no acute distress.   SKIN: warm, dry  HEAD: Normocephalic; atraumatic.  EYES: PERRL, EOMI, normal sclera and conjunctiva   ENT: No nasal discharge; airway clear.  NECK: Supple; non tender.  CARD: S1, S2 normal; no murmurs, gallops, or rubs. Regular rate and rhythm.   RESP: No wheezes, rales or rhonchi.  ABD: soft ntnd  EXT: b/l lower extremity swelling +left calf tenderness. chronic b/l lower extremity venous stasis. no erythema.   LYMPH: No acute cervical adenopathy.  NEURO: Alert, oriented, grossly unremarkable  PSYCH: Cooperative, appropriate.

## 2020-07-27 NOTE — ED ADULT NURSE NOTE - NSIMPLEMENTINTERV_GEN_ALL_ED
Implemented All Fall with Harm Risk Interventions:  [place] to call system. Call bell, personal items and telephone within reach. Instruct patient to call for assistance. Room bathroom lighting operational. Non-slip footwear when patient is off stretcher. Physically safe environment: no spills, clutter or unnecessary equipment. Stretcher in lowest position, wheels locked, appropriate side rails in place. Provide visual cue, wrist band, yellow gown, etc. Monitor gait and stability. Monitor for mental status changes and reorient to person, place, and time. Review medications for side effects contributing to fall risk. Reinforce activity limits and safety measures with patient and family. Provide visual clues: red socks.

## 2020-07-27 NOTE — ED CDU PROVIDER INITIAL DAY NOTE - OBJECTIVE STATEMENT
51 y/o male with PMHX of HTN, B/L LE DVTs s/p IVC filter presenting for multiple complaints. As per pt, has been having LE edema, SOB/NORT[place] and orthopnea since Thursday. Pt  was recently admitted to [facility] due to discectomy of L2-L3 on 6/28. During that time pt developed B/L LE DVT, had IVC filter placed and placed on Eliquis. Pt was in the hospital x 1 month,  with PT/Rehab. Pt states since he was discharged he developed B/L LE edema and SOB/Orthopnea/NORTON. Pt also admits to feeling tired when he talks. Denies any chest pain, palpitations, syncope, abnormal/excessive bleeding, dizziness/lightheadedness

## 2020-07-27 NOTE — H&P ADULT - HISTORY OF PRESENT ILLNESS
51 Y/O M w/ pmhx of microdiscectomy 6/28 due to MRI showing L2-3 disc herniation, R peroneal and L PT DVT  s/p IVC filter on 6/28 on [facility] (admission from 6/26 to July 20th),follows up with Dr. Wu vascular presents with sob orthopnea. 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 No fever, chills, n/v, cp,  pleuritic cp, palpitations, diaphoresis, cough, ha/lh/dizziness, numbness/tingling, neck pain/ stiffness, abd pain, diarrhea, constipation, melena/brbpr, urinary symptoms, trauma, weakness, sick contacts, recent travel or rash.  In the ED, CXR was negative. BNP was 37. CCTA was done in the ed? showing noncalcified plaques in the second diagonal branch. Cardiology eval was done and according to them Shortness of Breath is not related to this plaque and othewr causes like CHF and PE should be investigated. His vitals have remained stable and he is breathing on room air. He never needed o2 this admission??  On my examination, he is resting comfortably breathing on room air.  With no complains. He says that he gets that he gets the episode where he gets spasms in the chest and feels Short of Breath for a minute or two, then it resolves by its own only to return in an hour.

## 2020-07-27 NOTE — ED PROVIDER NOTE - CLINICAL SUMMARY MEDICAL DECISION MAKING FREE TEXT BOX
pt aware of all labs and imaging, trop negative, cxr with [facility], ekg similar to previous, pt reports intermittent sob, discussed obs as pt has never seen a cardiologist, pt would like to stay in obs, obs team aware ot.

## 2020-07-27 NOTE — CONSULT NOTE ADULT - SUBJECTIVE AND OBJECTIVE BOX
HPI:  51 y/o m w/ PMH of microdiscectomy 6/28 due to MRI showing L2-3 disc herniation, R peroneal and L PT DVT  s/p IVC filter on 6/28 on Eliquis (admission from 6/26 to July 20th), Dr. Wu vascular presents with b/l LE worsening swelling x 4 days, associated with sob worse on exertion, orthopnea.  Patient states that he has had worsening shortness of breath at rest, as well as orthopnea, and PND that has been occurring for the last few weeks.  His symptoms have worsened since last Thursday and he decided to come in.  In the ED he has CTC which showed severe diagonal disease.     PAST MEDICAL & SURGICAL HISTORY  Lumbar disc herniation  Kidney stones  HTN (hypertension)  Herniated lumbar intervertebral disc: discectomy  Kidney stone: ureteroscopy  Thrombus: thrombectomy of left leg      FAMILY HISTORY:  FAMILY HISTORY:  No pertinent family history in first degree relatives of premature CAD or sudden cardiac death       SOCIAL HISTORY:  []smoker  []Alcohol  []Drug    ALLERGIES:  No Known Allergies      MEDICATIONS:  MEDICATIONS  (STANDING):    MEDICATIONS  (PRN):      HOME MEDICATIONS:  Home Medications:  amLODIPine 5 mg oral tablet: 1 tab(s) orally once a day (04 Jul 2020 11:30)  Eliquis Starter Pack for Treatment of DVT and PE 5 mg oral tablet: 1 tab(s) orally 2 times a day, start on 7/15/2020 (04 Jul 2020 11:36)  methocarbamol 750 mg oral tablet: 1 tab(s) orally every 6 hours, stop after 10 days (04 Jul 2020 11:30)  Multiple Vitamins oral tablet: 1 tab(s) orally once a day (04 Jul 2020 11:30)  oxycodone-acetaminophen 5 mg-325 mg oral tablet: 1 tab(s) orally every 6 hours, As needed, Severe Pain (7 - 10) (10 Jul 2020 10:24)  polyethylene glycol 3350 oral powder for reconstitution: 17 gram(s) orally once a day, As needed, Constipation (04 Jul 2020 11:30)  Protonix 40 mg oral delayed release tablet: 1 tab(s) orally once a day (before a meal) (10 Jul 2020 10:49)  senna oral tablet: 2 tab(s) orally once a day (at bedtime) (04 Jul 2020 11:30)  traMADol 50 mg oral tablet: 0.5 tab(s) orally every 8 hours, As needed, Moderate Pain (4 - 6) (10 Jul 2020 10:24)      VITALS:   T(F): 96.5 (07-27 @ 07:17), Max: 96.5 (07-27 @ 07:17)  HR: 73 (07-27 @ 07:17) (73 - 73)  BP: 139/65 (07-27 @ 07:17) (139/65 - 139/65)  BP(mean): --  RR: 18 (07-27 @ 07:17) (18 - 18)  SpO2: 97% (07-27 @ 07:17) (97% - 97%)    I&O's Summary      REVIEW OF SYSTEMS:  CONSTITUTIONAL: No weakness, fevers or chills  EYES: No visual changes  ENT: No vertigo or throat pain   NECK: No pain or stiffness  RESPIRATORY: No cough, wheezing, hemoptysis; No shortness of breath  CARDIOVASCULAR: No chest pain or palpitations  GASTROINTESTINAL: No abdominal or epigastric pain. No nausea, vomiting, or hematemesis; No diarrhea or constipation. No melena or hematochezia.  GENITOURINARY: No dysuria, frequency or hematuria  NEUROLOGICAL: No numbness or weakness  SKIN: No itching, no rashes  MSK: No pain    PHYSICAL EXAM:  NEURO: patient is awake , alert and oriented  GEN: Not in acute distress  NECK: no thyroid enlargement, no JVD  LUNGS: Clear to auscultation bilaterally   CARDIOVASCULAR: S1/S2 present, RRR , no murmurs or rubs, no carotid bruits,  + PP bilaterally  ABD: Soft, non-tender, non-distended, +BS  EXT: No TIA  SKIN: Intact    LABS:                        13.8   7.83  )-----------( 232      ( 27 Jul 2020 08:16 )             40.8     07-27    144  |  107  |  9<L>  ----------------------------<  117<H>  3.8   |  25  |  0.8    Ca    9.1      27 Jul 2020 08:16    TPro  7.3  /  Alb  4.4  /  TBili  0.5  /  DBili  x   /  AST  16  /  ALT  23  /  AlkPhos  82  07-27    PT/INR - ( 27 Jul 2020 08:16 )   PT: 15.80 sec;   INR: 1.37 ratio         PTT - ( 27 Jul 2020 08:16 )  PTT:34.0 sec  Troponin T, Serum: <0.01 ng/mL (07-27-20 @ 13:23)  Troponin T, Serum: <0.01 ng/mL (07-27-20 @ 08:16)    CARDIAC MARKERS ( 27 Jul 2020 13:23 )  x     / <0.01 ng/mL / x     / x     / x      CARDIAC MARKERS ( 27 Jul 2020 08:16 )  x     / <0.01 ng/mL / x     / x     / x            Troponin trend:    Serum Pro-Brain Natriuretic Peptide: 37 pg/mL (07-27-20 @ 08:16)        RADIOLOGY:  -CXR:    XR CHEST PORTABLE IMMED 1V            PROCEDURE DATE:  07/27/2020      Impression:    No radiographic evidence of acute cardiopulmonary disease.      -TTE:  Pending   -CCTA:      CT HEART WITH CORONARIES            PROCEDURE DATE:  07/27/2020        IMAGED EXTRACARDIAC FINDINGS:  Bibasilar atelectasis..    IMPRESSION:    Noncalcified plaque within the second diagonal branch contributing to severe stenosis.    Focal calcified plaque within the proximal LAD without significant stenosis.    Patent remaining coronary arteries without significant plaque or stenosis.    The total Agatston coronary artery calcium score equals 5, which corresponds to 64th percentile for age, gender and ethnicity.    CAD-RADS 4A.    CAD-RADS 4  -    A. 70 - 99% Stenosis  or             Severe Stenosis    -STRESS TEST:  None    -CATHETERIZATION:  None    ECG:    Diagnosis Line Normal sinus rhythm  Possible Inferior infarct , age undetermined  Abnormal ECG    TELEMETRY EVENTS:  None HPI:  51 y/o m w/ PMH of microdiscectomy 6/28 due to MRI showing L2-3 disc herniation, R peroneal and L PT DVT  s/p IVC filter on 6/28 on Eliquis (admission from 6/26 to July 20th), Dr. Wu vascular presents with b/l LE worsening swelling x 4 days, associated with sob worse on exertion, orthopnea.  Patient states that he has had worsening shortness of breath at rest, as well as orthopnea, and PND that has been occurring for the last few weeks.  His symptoms have worsened since last Thursday and he decided to come in.  In the ED he has CTC which showed severe diagonal disease.     PAST MEDICAL & SURGICAL HISTORY  Lumbar disc herniation  Kidney stones  HTN (hypertension)  Herniated lumbar intervertebral disc: discectomy  Kidney stone: ureteroscopy  Thrombus: thrombectomy of left leg      FAMILY HISTORY:  FAMILY HISTORY:  No pertinent family history in first degree relatives of premature CAD or sudden cardiac death       SOCIAL HISTORY:  []Non smoker  []Former Alcohol  []No Drugs    ALLERGIES:  No Known Allergies      MEDICATIONS:  MEDICATIONS  (STANDING):    MEDICATIONS  (PRN):      HOME MEDICATIONS:  Home Medications:  amLODIPine 5 mg oral tablet: 1 tab(s) orally once a day (04 Jul 2020 11:30)  Eliquis Starter Pack for Treatment of DVT and PE 5 mg oral tablet: 1 tab(s) orally 2 times a day, start on 7/15/2020 (04 Jul 2020 11:36)  methocarbamol 750 mg oral tablet: 1 tab(s) orally every 6 hours, stop after 10 days (04 Jul 2020 11:30)  Multiple Vitamins oral tablet: 1 tab(s) orally once a day (04 Jul 2020 11:30)  oxycodone-acetaminophen 5 mg-325 mg oral tablet: 1 tab(s) orally every 6 hours, As needed, Severe Pain (7 - 10) (10 Jul 2020 10:24)  polyethylene glycol 3350 oral powder for reconstitution: 17 gram(s) orally once a day, As needed, Constipation (04 Jul 2020 11:30)  Protonix 40 mg oral delayed release tablet: 1 tab(s) orally once a day (before a meal) (10 Jul 2020 10:49)  senna oral tablet: 2 tab(s) orally once a day (at bedtime) (04 Jul 2020 11:30)  traMADol 50 mg oral tablet: 0.5 tab(s) orally every 8 hours, As needed, Moderate Pain (4 - 6) (10 Jul 2020 10:24)      VITALS:   T(F): 96.5 (07-27 @ 07:17), Max: 96.5 (07-27 @ 07:17)  HR: 73 (07-27 @ 07:17) (73 - 73)  BP: 139/65 (07-27 @ 07:17) (139/65 - 139/65)  BP(mean): --  RR: 18 (07-27 @ 07:17) (18 - 18)  SpO2: 97% (07-27 @ 07:17) (97% - 97%)    I&O's Summary      REVIEW OF SYSTEMS:  CONSTITUTIONAL: No weakness, fevers or chills  EYES: No visual changes  ENT: No vertigo or throat pain   NECK: No pain or stiffness  RESPIRATORY: No cough, wheezing, hemoptysis; No shortness of breath  CARDIOVASCULAR: No chest pain or palpitations  GASTROINTESTINAL: No abdominal or epigastric pain. No nausea, vomiting, or hematemesis; No diarrhea or constipation. No melena or hematochezia.  GENITOURINARY: No dysuria, frequency or hematuria  NEUROLOGICAL: No numbness or weakness  SKIN: No itching, no rashes  MSK: No pain    PHYSICAL EXAM:  NEURO: patient is awake , alert and oriented  GEN: Not in acute distress  NECK: no thyroid enlargement, no JVD  LUNGS: Clear to auscultation bilaterally   CARDIOVASCULAR: S1/S2 present, RRR , no murmurs or rubs, no carotid bruits,  + PP bilaterally  ABD: Soft, non-tender, non-distended, +BS  EXT: No TIA  SKIN: Intact    LABS:                        13.8   7.83  )-----------( 232      ( 27 Jul 2020 08:16 )             40.8     07-27    144  |  107  |  9<L>  ----------------------------<  117<H>  3.8   |  25  |  0.8    Ca    9.1      27 Jul 2020 08:16    TPro  7.3  /  Alb  4.4  /  TBili  0.5  /  DBili  x   /  AST  16  /  ALT  23  /  AlkPhos  82  07-27    PT/INR - ( 27 Jul 2020 08:16 )   PT: 15.80 sec;   INR: 1.37 ratio         PTT - ( 27 Jul 2020 08:16 )  PTT:34.0 sec  Troponin T, Serum: <0.01 ng/mL (07-27-20 @ 13:23)  Troponin T, Serum: <0.01 ng/mL (07-27-20 @ 08:16)    CARDIAC MARKERS ( 27 Jul 2020 13:23 )  x     / <0.01 ng/mL / x     / x     / x      CARDIAC MARKERS ( 27 Jul 2020 08:16 )  x     / <0.01 ng/mL / x     / x     / x            Troponin trend:    Serum Pro-Brain Natriuretic Peptide: 37 pg/mL (07-27-20 @ 08:16)        RADIOLOGY:  -CXR:    XR CHEST PORTABLE IMMED 1V            PROCEDURE DATE:  07/27/2020      Impression:    No radiographic evidence of acute cardiopulmonary disease.      -TTE:  Pending   -CCTA:      CT HEART WITH CORONARIES            PROCEDURE DATE:  07/27/2020        IMAGED EXTRACARDIAC FINDINGS:  Bibasilar atelectasis..    IMPRESSION:    Noncalcified plaque within the second diagonal branch contributing to severe stenosis.    Focal calcified plaque within the proximal LAD without significant stenosis.    Patent remaining coronary arteries without significant plaque or stenosis.    The total Agatston coronary artery calcium score equals 5, which corresponds to 64th percentile for age, gender and ethnicity.    CAD-RADS 4A.    CAD-RADS 4  -    A. 70 - 99% Stenosis  or             Severe Stenosis    -STRESS TEST:  None    -CATHETERIZATION:  None    ECG:    Diagnosis Line Normal sinus rhythm  Possible Inferior infarct , age undetermined  Abnormal ECG    TELEMETRY EVENTS:  None HPI:  51 y/o m w/ PMH of microdiscectomy 6/28 due to MRI showing L2-3 disc herniation, R peroneal and L PT DVT  s/p IVC filter on 6/28 on Eliquis (admission from 6/26 to July 20th), Dr. Wu vascular presents with b/l LE worsening swelling x 4 days, associated with sob worse on exertion, orthopnea.  Patient states that he has had worsening shortness of breath at rest, as well as orthopnea, and PND that has been occurring for the last few weeks.  His symptoms have worsened since last Thursday and he decided to come in.  In the ED he has CTC which showed severe diagonal disease.     PAST MEDICAL & SURGICAL HISTORY  Lumbar disc herniation  Kidney stones  HTN (hypertension)  Herniated lumbar intervertebral disc: discectomy  Kidney stone: ureteroscopy  Thrombus: thrombectomy of left leg      FAMILY HISTORY:  FAMILY HISTORY:  No pertinent family history in first degree relatives of premature CAD or sudden cardiac death       SOCIAL HISTORY:  []Non smoker  []Former Alcohol  []No Drugs    ALLERGIES:  No Known Allergies      MEDICATIONS:  MEDICATIONS  (STANDING):    MEDICATIONS  (PRN):      HOME MEDICATIONS:  Home Medications:  amLODIPine 5 mg oral tablet: 1 tab(s) orally once a day (04 Jul 2020 11:30)  Eliquis Starter Pack for Treatment of DVT and PE 5 mg oral tablet: 1 tab(s) orally 2 times a day, start on 7/15/2020 (04 Jul 2020 11:36)  methocarbamol 750 mg oral tablet: 1 tab(s) orally every 6 hours, stop after 10 days (04 Jul 2020 11:30)  Multiple Vitamins oral tablet: 1 tab(s) orally once a day (04 Jul 2020 11:30)  oxycodone-acetaminophen 5 mg-325 mg oral tablet: 1 tab(s) orally every 6 hours, As needed, Severe Pain (7 - 10) (10 Jul 2020 10:24)  polyethylene glycol 3350 oral powder for reconstitution: 17 gram(s) orally once a day, As needed, Constipation (04 Jul 2020 11:30)  Protonix 40 mg oral delayed release tablet: 1 tab(s) orally once a day (before a meal) (10 Jul 2020 10:49)  senna oral tablet: 2 tab(s) orally once a day (at bedtime) (04 Jul 2020 11:30)  traMADol 50 mg oral tablet: 0.5 tab(s) orally every 8 hours, As needed, Moderate Pain (4 - 6) (10 Jul 2020 10:24)      VITALS:   T(F): 96.5 (07-27 @ 07:17), Max: 96.5 (07-27 @ 07:17)  HR: 73 (07-27 @ 07:17) (73 - 73)  BP: 139/65 (07-27 @ 07:17) (139/65 - 139/65)  BP(mean): --  RR: 18 (07-27 @ 07:17) (18 - 18)  SpO2: 97% (07-27 @ 07:17) (97% - 97%)    I&O's Summary      REVIEW OF SYSTEMS:  CONSTITUTIONAL: No weakness, fevers or chills  EYES: No visual changes  ENT: No vertigo or throat pain   NECK: No pain or stiffness  RESPIRATORY: No cough, wheezing, hemoptysis; No shortness of breath  CARDIOVASCULAR: No chest pain or palpitations  GASTROINTESTINAL: No abdominal or epigastric pain. No nausea, vomiting, or hematemesis; No diarrhea or constipation. No melena or hematochezia.  GENITOURINARY: No dysuria, frequency or hematuria  NEUROLOGICAL: No numbness or weakness  SKIN: No itching, no rashes  MSK: No pain    PHYSICAL EXAM:  NEURO: patient is awake , alert and oriented  GEN: Not in acute distress  NECK: no thyroid enlargement, no JVD  LUNGS: Clear to auscultation bilaterally   CARDIOVASCULAR: S1/S2 present, RRR , no murmurs or rubs, no carotid bruits,  + PP bilaterally  ABD: Soft, non-tender, non-distended, +BS  EXT: +/- B/L LE pitting edema   SKIN: Intact    LABS:                        13.8   7.83  )-----------( 232      ( 27 Jul 2020 08:16 )             40.8     07-27    144  |  107  |  9<L>  ----------------------------<  117<H>  3.8   |  25  |  0.8    Ca    9.1      27 Jul 2020 08:16    TPro  7.3  /  Alb  4.4  /  TBili  0.5  /  DBili  x   /  AST  16  /  ALT  23  /  AlkPhos  82  07-27    PT/INR - ( 27 Jul 2020 08:16 )   PT: 15.80 sec;   INR: 1.37 ratio         PTT - ( 27 Jul 2020 08:16 )  PTT:34.0 sec  Troponin T, Serum: <0.01 ng/mL (07-27-20 @ 13:23)  Troponin T, Serum: <0.01 ng/mL (07-27-20 @ 08:16)    CARDIAC MARKERS ( 27 Jul 2020 13:23 )  x     / <0.01 ng/mL / x     / x     / x      CARDIAC MARKERS ( 27 Jul 2020 08:16 )  x     / <0.01 ng/mL / x     / x     / x            Troponin trend:    Serum Pro-Brain Natriuretic Peptide: 37 pg/mL (07-27-20 @ 08:16)        RADIOLOGY:  -CXR:    XR CHEST PORTABLE IMMED 1V            PROCEDURE DATE:  07/27/2020      Impression:    No radiographic evidence of acute cardiopulmonary disease.      -TTE:  Pending   -CCTA:      CT HEART WITH CORONARIES            PROCEDURE DATE:  07/27/2020        IMAGED EXTRACARDIAC FINDINGS:  Bibasilar atelectasis..    IMPRESSION:    Noncalcified plaque within the second diagonal branch contributing to severe stenosis.    Focal calcified plaque within the proximal LAD without significant stenosis.    Patent remaining coronary arteries without significant plaque or stenosis.    The total Agatston coronary artery calcium score equals 5, which corresponds to 64th percentile for age, gender and ethnicity.    CAD-RADS 4A.    CAD-RADS 4  -    A. 70 - 99% Stenosis  or             Severe Stenosis    -STRESS TEST:  None    -CATHETERIZATION:  None    ECG:    Diagnosis Line Normal sinus rhythm  Possible Inferior infarct , age undetermined  Abnormal ECG    TELEMETRY EVENTS:  None HPI:  51 y/o m w/ PMH of microdiscectomy 6/28 due to MRI showing L2-3 disc herniation, R peroneal and L PT DVT  s/p IVC filter on 6/28 on Eliquis (admission from 6/26 to July 20th), Dr. Wu vascular presents with b/l LE worsening swelling x 4 days, associated with sob worse on exertion, orthopnea.  Patient states that he has had worsening shortness of breath at rest, as well as orthopnea, and PND that has been occurring for the last few weeks.  His symptoms have worsened since last Thursday and he decided to come in.  In the ED he has CTC which showed severe diagonal disease.     PAST MEDICAL & SURGICAL HISTORY  Lumbar disc herniation  Kidney stones  HTN (hypertension)  Herniated lumbar intervertebral disc: discectomy  Kidney stone: ureteroscopy  Thrombus: thrombectomy of left leg      FAMILY HISTORY:  FAMILY HISTORY:  No pertinent family history in first degree relatives of premature CAD or sudden cardiac death     SOCIAL HISTORY: Denies EtOH, smoking or drug use    ALLERGIES:  No Known Allergies      MEDICATIONS  (STANDING):  amLODIPine   Tablet 5 milliGRAM(s) Oral daily  apixaban 5 milliGRAM(s) Oral every 12 hours  aspirin  chewable 81 milliGRAM(s) Oral daily  atorvastatin 40 milliGRAM(s) Oral at bedtime  lisinopril 5 milliGRAM(s) Oral daily  metoprolol tartrate 25 milliGRAM(s) Oral two times a day  multivitamin 1 Tablet(s) Oral daily  pantoprazole    Tablet 40 milliGRAM(s) Oral before breakfast  senna 2 Tablet(s) Oral at bedtime  tamsulosin 0.4 milliGRAM(s) Oral at bedtime      Home Medications:  amLODIPine 5 mg oral tablet: 1 tab(s) orally once a day (04 Jul 2020 11:30)  Eliquis Starter Pack for Treatment of DVT and PE 5 mg oral tablet: 1 tab(s) orally 2 times a day, start on 7/15/2020 (04 Jul 2020 11:36)  methocarbamol 750 mg oral tablet: 1 tab(s) orally every 6 hours, stop after 10 days (04 Jul 2020 11:30)  Multiple Vitamins oral tablet: 1 tab(s) orally once a day (04 Jul 2020 11:30)  oxycodone-acetaminophen 5 mg-325 mg oral tablet: 1 tab(s) orally every 6 hours, As needed, Severe Pain (7 - 10) (10 Jul 2020 10:24)  polyethylene glycol 3350 oral powder for reconstitution: 17 gram(s) orally once a day, As needed, Constipation (04 Jul 2020 11:30)  Protonix 40 mg oral delayed release tablet: 1 tab(s) orally once a day (before a meal) (10 Jul 2020 10:49)  senna oral tablet: 2 tab(s) orally once a day (at bedtime) (04 Jul 2020 11:30)  traMADol 50 mg oral tablet: 0.5 tab(s) orally every 8 hours, As needed, Moderate Pain (4 - 6) (10 Jul 2020 10:24)      VITALS:   T(F): 96.5 (07-27 @ 07:17), Max: 96.5 (07-27 @ 07:17)  HR: 73 (07-27 @ 07:17) (73 - 73)  BP: 139/65 (07-27 @ 07:17) (139/65 - 139/65)  BP(mean): --  RR: 18 (07-27 @ 07:17) (18 - 18)  SpO2: 97% (07-27 @ 07:17) (97% - 97%)      REVIEW OF SYSTEMS:  CONSTITUTIONAL: No fever, weight loss, fatigue  NECK: No pain or stiffness  RESPIRATORY: See HPI  CARDIOVASCULAR: See HPI  GASTROINTESTINAL: No abdominal/epigastric pain, nausea, vomiting, hematemesis, diarrhea, constipation, melena or hematochezia  GENITOURINARY: No dysuria, frequency, hematuria, incontinence  NEUROLOGICAL: No headaches, memory loss, loss of strength, numbness, tremors  SKIN: No itching, burning, rashes, lesions   ENDOCRINE: No heat/cold intolerance or hair loss  MUSCULOSKELETAL: No joint pain or swelling  HEME/LYMPH: No easy bruising or bleeding gums    PHYSICAL EXAM:  NEURO: patient is awake , alert and oriented  GEN: Not in acute distress  NECK: no thyroid enlargement, no JVD  LUNGS: Clear to auscultation bilaterally   CARDIOVASCULAR: S1/S2 present, RRR , no murmurs or rubs, no carotid bruits,  + PP bilaterally  ABD: Soft, non-tender, non-distended, +BS  EXT: +/- B/L LE pitting edema   SKIN: Intact    LABS:                        13.8   7.83  )-----------( 232      ( 27 Jul 2020 08:16 )             40.8     07-27    144  |  107  |  9<L>  ----------------------------<  117<H>  3.8   |  25  |  0.8    Ca    9.1      27 Jul 2020 08:16    TPro  7.3  /  Alb  4.4  /  TBili  0.5  /  DBili  x   /  AST  16  /  ALT  23  /  AlkPhos  82  07-27    PT/INR - ( 27 Jul 2020 08:16 )   PT: 15.80 sec;   INR: 1.37 ratio      PTT - ( 27 Jul 2020 08:16 )  PTT:34.0 sec      Troponin T, Serum: <0.01 ng/mL (07-27-20 @ 13:23)  Troponin T, Serum: <0.01 ng/mL (07-27-20 @ 08:16)        Serum Pro-Brain Natriuretic Peptide: 37 pg/mL (07-27-20 @ 08:16)        RADIOLOGY:  -CXR:    XR CHEST PORTABLE IMMED 1V            PROCEDURE DATE:  07/27/2020      Impression:    No radiographic evidence of acute cardiopulmonary disease.      -TTE:  Pending       -CCTA:      CT HEART WITH CORONARIES            PROCEDURE DATE:  07/27/2020        IMAGED EXTRACARDIAC FINDINGS:  Bibasilar atelectasis..    IMPRESSION:    Noncalcified plaque within the second diagonal branch contributing to severe stenosis.    Focal calcified plaque within the proximal LAD without significant stenosis.    Patent remaining coronary arteries without significant plaque or stenosis.    The total Agatston coronary artery calcium score equals 5, which corresponds to 64th percentile for age, gender and ethnicity.    CAD-RADS 4A.    CAD-RADS 4  -    A. 70 - 99% Stenosis  or             Severe Stenosis        -STRESS TEST:  None    -CATHETERIZATION:  None    ECG:    Diagnosis Line Normal sinus rhythm  Possible Inferior infarct , age undetermined  Abnormal ECG    TELEMETRY EVENTS:  None

## 2020-07-27 NOTE — ED PROVIDER NOTE - PROGRESS NOTE DETAILS
Vascular tech stated [facility] duplex negative for DVTs. pt reports still gets sob at times, does not have cardiologist, prelim duplex with no longer DVT, trop negative, ekg similar to previous, pt states he has not had cardiac workup in the past.

## 2020-07-27 NOTE — H&P ADULT - ASSESSMENT
49 Y/O M w/ pmhx of microdiscectomy 6/28 due to MRI showing L2-3 disc herniation, R peroneal and L PT DVT  s/p IVC filter on 6/28 on levi (admission from 6/26 to July 20th),follows up with Dr. Wu vascular presents with sob orthopnea. 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 No fever, chills, n/v, cp,  pleuritic cp, palpitations, diaphoresis, cough, ha/lh/dizziness, numbness/tingling, neck pain/ stiffness, abd pain, diarrhea, constipation, melena/brbpr, urinary symptoms, trauma, weakness, sick contacts, recent travel or rash.  In the ED, CXR was negative. BNP was 37. CCTA was done in the ed? showing noncalcified plaques in the second diagonal branch. Cardiology eval was done and according to them Shortness of Breath is not related to this plaque and othewr causes like CHF and PE should be investigated. His vitals have remained stable and he is breathing on room air. He never needed o2 this admission??  On my examination, he is resting comfortably breathing on room air.  With no complains. He says that he gets that he gets the episode where he gets spasms in the chest and feels Short of Breath for a minute or two, then it resolves by its own only to return in an hour.    Shortness of Breath/Subjective  -CCTA second diagonal plaque -Cardiology not impressed as   a cause of SOB  -Duplex negative for DVT  -BNP 37 in a non-obese patient - rules out CHF  -Unlikely PE as patient is on room air, with stable vitals,  has IVC filter and is on Eliquis  -SOB is very Subjective, comes in episode with chest spasms.  -Consider CT angio tomorrow in AM if SOB persist  (Will not give contrast again today - Already on Eliquis and no Right  Heart Strain)  -If symptoms of chest spasms continues also consider ruling out  esophageal motility disorders complains of cold water  getting stuck in the chest    H/O recent Provoked DVT  -Minimally mobile state due to wheelchair bound  due to Lumbar spinal issues  -Duplex negative this admission has IVC filter  (Was placed for Neurosurgery last month_  -Continue Eliquis    H/O Lumbar Laminectomy last month  -Pain meds PRN    Hypertension  -On amlodipine    Diet -DASH  Activity - Wheelchair   DVT -On Eliquis  GI -PPI  FULL CODE

## 2020-07-27 NOTE — ED CDU PROVIDER INITIAL DAY NOTE - MEDICAL DECISION MAKING DETAILS
49yo M presents for LE swelling, NORTON, orthopnea for 4 days. Placed in [facility] for further cardiac workup. Pending CCTA, ECHO

## 2020-07-27 NOTE — ED CDU PROVIDER INITIAL DAY NOTE - ATTENDING CONTRIBUTION TO CARE
49yo M with PMHx R peroneal and L PT DVT s/p IVC filter (6/28) on Eliquis, L2-L3 disc herniation s/p microdiscectomy (6/28), HTN, venous stasis, nonsmoker, presents for B/L LE swelling [place], orthopnea, for 4 days, . Pt states venous stasis skin changes are for "30 years" but the leg swelling is new. Pt was recently admitted for 1 month s/p aforementioned surgeries, states did not have these symptoms while inpatient. Denies fever/chills, headache, lightheadedness, cough, palpitations, nausea, vomiting, diarrhea, abd pain, dysuria, leg pain.     Initial workup unrevealing. Labs WNL. Troponin negative. CXR WNL. EKG nonischemic. Repeat duplex of LE showing resolution of DVTs.     Vital signs reviewed  GENERAL: Patient nontoxic appearing, NAD  HEAD: NCAT  EYES: Anicteric  ENT: MMM  RESPIRATORY: Normal respiratory effort. CTA B/L. No wheezing, rales, rhonchi  CARDIOVASCULAR: Regular rate and rhythm  ABDOMEN: Soft. Nondistended. Nontender. No guarding or rebound.   MUSCULOSKELETAL/EXTREMITIES: Brisk cap refill. Equal radial pulses. B/L ankle edema.  SKIN:  Warm and dry. B/L lower leg chronic stasis changes.  NEURO: AAOx3. No gross FND.

## 2020-07-27 NOTE — ED CDU PROVIDER DISPOSITION NOTE - CLINICAL COURSE
49yo M with PMHx HTN, L2-3 herniation s/p microdiscectomy, kidney stones, DVTs on Eliquis s/p IVC filter, presents for SOB on exertion, LE edema, orthopnea for past 4 days. Initial ED workup unrevealing. Troponin negative. BNP WNL. CXR WNL. EKG nonischemic. DVTs resolved. CCTA significant for "Noncalcified plaque within the second diagonal branch contributing to severe stenosis." Case d/w cardiology, will admit patient for further workup. Echo ordered in EDOU still pending.

## 2020-07-27 NOTE — H&P ADULT - ATTENDING COMMENTS
Patient seen and examined independently. I agree with the resident's note, physical exam, and plan except as below.  Vital Signs Last 24 Hrs  T(C): 36.4 (28 Jul 2020 08:47), Max: 36.8 (27 Jul 2020 23:10)  T(F): 97.5 (28 Jul 2020 08:47), Max: 98.2 (27 Jul 2020 23:10)  HR: 60 (28 Jul 2020 08:47) (55 - 81)  BP: 103/75 (28 Jul 2020 08:47) (103/75 - 122/59)  BP(mean): --  RR: 18 (28 Jul 2020 08:47) (16 - 18)  SpO2: 96% (28 Jul 2020 08:47) (96% - 99%)  Pe  nad  aaox3  [id number]  ctabl  soft ntnd+bs  chronic venous statsis cahnges +1 edema bilat  no thyromegaly, nontender    ROS: states he feels sob and difficulty swallowing cold liquids only    #subjective sob without hypoxia  unclear etiology - may be due to pharyngeal path - sp recent surgery 6/28 was intubated  no ACS  CCTA done -   < from: CT Heart with Coronaries (07.27.20 @ 12:02) >    Noncalcified plaque within the second diagonal branch contributing to severe stenosis.    Focal calcified plaque within the proximal LAD without significant stenosis.    Patent remaining coronary arteries without significant plaque or stenosis.    < end of copied text >    cont medical therapy   seen by cardio - asa/statin - medical tx    #post op provoked DVT sp ivc filter and eliquis use - low suspicion for PE - unrealted to symptoms - no need for CT angio at this time - not recommended by Pulmonary    #sp lumbar laminectomy has been bedbound and wheelchair bound - functional quadreplegia      follow up ENT consult for possible scope to eval post pharynx   outpt BRISA eval with sleep study  discussed with daughter at bedside  anticipate dc in 24 hours if stable Patient seen and examined independently. I agree with the resident's note, physical exam, and plan except as below.  Vital Signs Last 24 Hrs  T(C): 36.4 (28 Jul 2020 08:47), Max: 36.8 (27 Jul 2020 23:10)  T(F): 97.5 (28 Jul 2020 08:47), Max: 98.2 (27 Jul 2020 23:10)  HR: 60 (28 Jul 2020 08:47) (55 - 81)  BP: 103/75 (28 Jul 2020 08:47) (103/75 - 122/59)  BP(mean): --  RR: 18 (28 Jul 2020 08:47) (16 - 18)  SpO2: 96% (28 Jul 2020 08:47) (96% - 99%)  Pe  nad  aaox3  [id number]  ctabl  soft ntnd+bs  chronic venous statsis cahnges +1 edema bilat  no thyromegaly, nontender    ROS: states he feels sob and difficulty swallowing cold liquids only    #subjective sob without hypoxia  unclear etiology - may be due to pharyngeal path - sp recent surgery 6/28 was intubated  no ACS  CCTA done -   < from: CT Heart with Coronaries (07.27.20 @ 12:02) >    Noncalcified plaque within the second diagonal branch contributing to severe stenosis.    Focal calcified plaque within the proximal LAD without significant stenosis.    Patent remaining coronary arteries without significant plaque or stenosis.    < end of copied text >    cont medical therapy   seen by cardio - asa/statin - medical tx    #post op provoked DVT sp ivc filter and eliquis use - low suspicion for PE - unrealted to symptoms - no need for CT angio at this time - not recommended by Pulmonary    #sp lumbar laminectomy has been bedbound and wheelchair bound - functional quadreplegia      follow up ENT consult for possible scope to eval post pharynx   outpt BRISA eval with sleep study  follow up esophogram done today  discussed with daughter at bedside  anticipate dc in 24 hours if stable

## 2020-07-27 NOTE — ED ADULT NURSE NOTE - OBJECTIVE STATEMENT
Pt presented with c/o b/l LE swelling and SOB starting tuesday. As per pt, SOB progressing from with activity to at rest. B/L LE dusky, as per pt this is his baseline due to varicose veins. Denies pain to lower extremities, but endorses numbness. Denies changes in bowel or urinary patterns. Recent back sx last month. Denies n/v/fever. Alert and oriented x3. Ambulates with wheelchair, baseline ambulation status.

## 2020-07-27 NOTE — ED CDU PROVIDER INITIAL DAY NOTE - PROGRESS NOTE DETAILS
WYATT Sawant d/w cardiology fellow regarding CCTA read, they will come eval. Still pending echo Spoke with cardiology fellow, for further work up of CHF vs PE. No cath necessary currently. Pt will be started on medications for management of severe stenosis of second diagonal branch and lasix prn for LE edema.

## 2020-07-27 NOTE — ED CDU PROVIDER DISPOSITION NOTE - SECONDARY DIAGNOSIS.
SOB (shortness of breath) on exertion Orthopnea Lower extremity edema CHF (congestive heart failure)

## 2020-07-28 LAB
ALBUMIN SERPL ELPH-MCNC: 3.8 G/DL — SIGNIFICANT CHANGE UP (ref 3.5–5.2)
ALP SERPL-CCNC: 76 U/L — SIGNIFICANT CHANGE UP (ref 30–115)
ALT FLD-CCNC: 22 U/L — SIGNIFICANT CHANGE UP (ref 0–41)
ANION GAP SERPL CALC-SCNC: 13 MMOL/L — SIGNIFICANT CHANGE UP (ref 7–14)
AST SERPL-CCNC: 16 U/L — SIGNIFICANT CHANGE UP (ref 0–41)
BILIRUB SERPL-MCNC: 0.5 MG/DL — SIGNIFICANT CHANGE UP (ref 0.2–1.2)
BUN SERPL-MCNC: 5 MG/DL — LOW (ref 10–20)
CALCIUM SERPL-MCNC: 8.9 MG/DL — SIGNIFICANT CHANGE UP (ref 8.5–10.1)
CHLORIDE SERPL-SCNC: 105 MMOL/L — SIGNIFICANT CHANGE UP (ref 98–110)
CO2 SERPL-SCNC: 26 MMOL/L — SIGNIFICANT CHANGE UP (ref 17–32)
CREAT SERPL-MCNC: 0.6 MG/DL — LOW (ref 0.7–1.5)
GLUCOSE SERPL-MCNC: 89 MG/DL — SIGNIFICANT CHANGE UP (ref 70–99)
HCT VFR BLD CALC: 38.5 % — LOW (ref 42–52)
HGB BLD-MCNC: 12.9 G/DL — LOW (ref 14–18)
MCHC RBC-ENTMCNC: 29.1 PG — SIGNIFICANT CHANGE UP (ref 27–31)
MCHC RBC-ENTMCNC: 33.5 G/DL — SIGNIFICANT CHANGE UP (ref 32–37)
MCV RBC AUTO: 86.7 FL — SIGNIFICANT CHANGE UP (ref 80–94)
NRBC # BLD: 0 /100 WBCS — SIGNIFICANT CHANGE UP (ref 0–0)
PLATELET # BLD AUTO: 220 K/UL — SIGNIFICANT CHANGE UP (ref 130–400)
POTASSIUM SERPL-MCNC: 3.9 MMOL/L — SIGNIFICANT CHANGE UP (ref 3.5–5)
POTASSIUM SERPL-SCNC: 3.9 MMOL/L — SIGNIFICANT CHANGE UP (ref 3.5–5)
PROT SERPL-MCNC: 6.1 G/DL — SIGNIFICANT CHANGE UP (ref 6–8)
RBC # BLD: 4.44 M/UL — LOW (ref 4.7–6.1)
RBC # FLD: 13.3 % — SIGNIFICANT CHANGE UP (ref 11.5–14.5)
SARS-COV-2 RNA SPEC QL NAA+PROBE: SIGNIFICANT CHANGE UP
SODIUM SERPL-SCNC: 144 MMOL/L — SIGNIFICANT CHANGE UP (ref 135–146)
WBC # BLD: 7.49 K/UL — SIGNIFICANT CHANGE UP (ref 4.8–10.8)
WBC # FLD AUTO: 7.49 K/UL — SIGNIFICANT CHANGE UP (ref 4.8–10.8)

## 2020-07-28 PROCEDURE — 99223 1ST HOSP IP/OBS HIGH 75: CPT

## 2020-07-28 PROCEDURE — 99253 IP/OBS CNSLTJ NEW/EST LOW 45: CPT

## 2020-07-28 PROCEDURE — 74220 X-RAY XM ESOPHAGUS 1CNTRST: CPT | Mod: 26

## 2020-07-28 RX ORDER — IOHEXOL 300 MG/ML
30 INJECTION, SOLUTION INTRAVENOUS ONCE
Refills: 0 | Status: DISCONTINUED | OUTPATIENT
Start: 2020-07-28 | End: 2020-07-29

## 2020-07-28 RX ADMIN — Medication 1 TABLET(S): at 11:30

## 2020-07-28 RX ADMIN — LISINOPRIL 5 MILLIGRAM(S): 2.5 TABLET ORAL at 06:12

## 2020-07-28 RX ADMIN — ATORVASTATIN CALCIUM 40 MILLIGRAM(S): 80 TABLET, FILM COATED ORAL at 21:09

## 2020-07-28 RX ADMIN — APIXABAN 5 MILLIGRAM(S): 2.5 TABLET, FILM COATED ORAL at 18:29

## 2020-07-28 RX ADMIN — SENNA PLUS 2 TABLET(S): 8.6 TABLET ORAL at 21:09

## 2020-07-28 RX ADMIN — Medication 25 MILLIGRAM(S): at 18:31

## 2020-07-28 RX ADMIN — APIXABAN 5 MILLIGRAM(S): 2.5 TABLET, FILM COATED ORAL at 11:30

## 2020-07-28 RX ADMIN — AMLODIPINE BESYLATE 5 MILLIGRAM(S): 2.5 TABLET ORAL at 06:12

## 2020-07-28 RX ADMIN — TAMSULOSIN HYDROCHLORIDE 0.4 MILLIGRAM(S): 0.4 CAPSULE ORAL at 21:09

## 2020-07-28 RX ADMIN — PANTOPRAZOLE SODIUM 40 MILLIGRAM(S): 20 TABLET, DELAYED RELEASE ORAL at 06:12

## 2020-07-28 RX ADMIN — Medication 81 MILLIGRAM(S): at 11:30

## 2020-07-28 NOTE — CONSULT NOTE ADULT - ASSESSMENT
Impression: Impression:  Possible B/L vocal cord paralysis (Recent tracheal intubation)  High likely BRISA (STOPBANG 5)  CAD (CCTA- non clacified severe stenosis in second diagonal)  recent DVT s/p GFF and on eliquis  ruled out CHF      Plan:  ENT consult for NPL  sleep studies and PFTs as outpatient  will likely need stress test  continue with eliquis Impression:  Possible B/L vocal cord paralysis (Recent tracheal intubation)  High likely BRISA (STOPBANG 5)  CAD (CCTA- non clacified severe stenosis in second diagonal)  recent DVT s/p GFF and on eliquis  ruled out CHF      Plan:  ENT consult for NPL  PEF at bedside  Sleep studies and PFTs as outpatient  will likely need stress test  continue with Eliquis Impression:  Possible B/L vocal cord paralysis (Recent tracheal intubation)  Possible component of Anxiety  High likely BRISA (STOPBANG 5)  CAD (CCTA- non clacified severe stenosis in second diagonal) (CAD_RADS-4)  recent DVT s/p GFF and on eliquis  ruled out CHF      Plan:  ENT consult for NPL  PEF at bedside  Sleep studies and PFTs as outpatient  will likely need stress test  continue with Eliquis Impression:  High likely BRISA (STOPBANG 5)  CAD (CCTA- non clacified severe stenosis in second diagonal) (CAD_RADS-4)  HO recent DVT s/p GFF.  On eliquis      Plan:  ENT consult for NPL  Sleep studies and PFTs as outpatient  FU with cardiology   Continue with Eliquis for now   PFT supine and sitting

## 2020-07-28 NOTE — CONSULT NOTE ADULT - SUBJECTIVE AND OBJECTIVE BOX
H&P:  51 Y/O M w/ pmhx of microdiscectomy 6/28 due to MRI showing L2-3 disc herniation, R peroneal and L PT DVT  s/p IVC filter on 6/28 on [facility] (admission from 6/26 to July 20th),follows up with Dr. Wu vascular presents with sob orthopnea. 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 No fever, chills, n/v, cp,  pleuritic cp, palpitations, diaphoresis, cough, ha/lh/dizziness, numbness/tingling, neck pain/ stiffness, abd pain, diarrhea, constipation, melena/brbpr, urinary symptoms, trauma, weakness, sick contacts, recent travel or rash.  In the ED, CXR was negative. BNP was 37. CCTA was done in the ed? showing noncalcified plaques in the second diagonal branch. Cardiology eval was done and according to them Shortness of Breath is not related to this plaque and othewr causes like CHF and PE should be investigated. His vitals have remained stable and he is breathing on room air. He never needed o2 this admission??  On my examination, he is resting comfortably breathing on room air.  With no complains. He says that he gets that he gets the episode where he gets spasms in the chest and feels Short of Breath for a minute or two, then it resolves by its own only to return in an hour (27-Jul-2020 19:09)    ENT CONSULT:  Pt is a 49 yo M with an extensive PMHx of b/l LE DVT s/p IVC filter, s/p thrombectomy, nephrolithiasis, lumbar disc herniation, GERD, HTN now admitted with shortness of breath. Pt states since Thursday he gets episodes where he feels like he is short of breath at rest, but then resolves on its own. Pt also reports feeling of "air stuck in throat" and states he feels a similar sensation when drinking cold water. Denies inability to control secretions or inability to tolerate PO intake. Pt denies odynophagia, dizziness, palpitations, fevers, chills, N/V.     PAST MEDICAL & SURGICAL HISTORY:  Lumbar disc herniation  Kidney stones  HTN (hypertension)  Herniated lumbar intervertebral disc: discectomy  Kidney stone: ureteroscopy  Thrombus: thrombectomy of left leg      MEDICATIONS  (STANDING):  amLODIPine   Tablet 5 milliGRAM(s) Oral daily  apixaban 5 milliGRAM(s) Oral every 12 hours  aspirin  chewable 81 milliGRAM(s) Oral daily  atorvastatin 40 milliGRAM(s) Oral at bedtime  lisinopril 5 milliGRAM(s) Oral daily  metoprolol tartrate 25 milliGRAM(s) Oral two times a day  multivitamin 1 Tablet(s) Oral daily  pantoprazole    Tablet 40 milliGRAM(s) Oral before breakfast  senna 2 Tablet(s) Oral at bedtime  tamsulosin 0.4 milliGRAM(s) Oral at bedtime    MEDICATIONS  (PRN):  furosemide   Injectable 40 milliGRAM(s) IV Push daily PRN SOB/LE EDEMA  iohexol 300 mG (iodine)/mL Oral Solution 30 milliLiter(s) Oral once PRN FOR BARIUM SWALLOW  methocarbamol 750 milliGRAM(s) Oral every 6 hours PRN MUSCLE SPASMS  oxycodone    5 mG/acetaminophen 325 mG 1 Tablet(s) Oral every 6 hours PRN Severe Pain (7 - 10)  polyethylene glycol 3350 17 Gram(s) Oral daily PRN Constipation  traMADol 25 milliGRAM(s) Oral every 8 hours PRN Moderate Pain (4 - 6)      Allergies  No Known Allergies    Intolerances    SOCIAL HISTORY:    FAMILY HISTORY:  No pertinent family history in first degree relatives      Review of Systems:  [X] A ten point review of systems was otherwise negative except as noted.    [ ] Due to altered mental status/ intubation, subjective information was not able to be obtained from the patient. History was obtained, to the extent possible, from review of the chart and collateral sources of information     Vital Signs Last 24 Hrs  T(C): 36.1 (28 Jul 2020 20:35), Max: 36.8 (27 Jul 2020 23:10)  T(F): 97 (28 Jul 2020 20:35), Max: 98.2 (27 Jul 2020 23:10)  HR: 75 (28 Jul 2020 20:35) (55 - 75)  BP: 115/60 (28 Jul 2020 20:35) (100/54 - 122/59)  RR: 18 (28 Jul 2020 20:35) (16 - 18)  SpO2: 96% (28 Jul 2020 18:15) (96% - 99%)    GEN:  NAD, awake and alert. No drooling or pooling of secretions. No stridor. Good vocal quality, no hoarseness.   NEURO: AAO3  HEAD: NC/AT  ENT:   NECK: Traceha midline  RESP: Non-labored breathing  ABDO: Soft, NT  EXT: +edema to b/l LE    Fiberoptic Laryngoscopy: No masses or lesions noted to NP/OP/HP. + lingual hypertrophy. laryngeal structures intact, no edema or erythema noted. Epiglottis crisp, no edema. TVC/FVC mobile and intact, no glottic gap noted.     LABS:                        12.9   7.49  )-----------( 220      ( 28 Jul 2020 11:52 )             38.5     07-28    144  |  105  |  5<L>  ----------------------------<  89  3.9   |  26  |  0.6<L>    Ca    8.9      28 Jul 2020 11:52    TPro  6.1  /  Alb  3.8  /  TBili  0.5  /  DBili  x   /  AST  16  /  ALT  22  /  AlkPhos  76  07-28    PT/INR - ( 27 Jul 2020 08:16 )   PT: 15.80 sec;   INR: 1.37 ratio         PTT - ( 27 Jul 2020 08:16 )  PTT:34.0 sec H&P:  51 Y/O M w/ pmhx of microdiscectomy 6/28 due to MRI showing L2-3 disc herniation, R peroneal and L PT DVT  s/p IVC filter on 6/28 on [facility] (admission from 6/26 to July 20th),follows up with Dr. Wu vascular presents with sob orthopnea. 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 No fever, chills, n/v, cp,  pleuritic cp, palpitations, diaphoresis, cough, ha/lh/dizziness, numbness/tingling, neck pain/ stiffness, abd pain, diarrhea, constipation, melena/brbpr, urinary symptoms, trauma, weakness, sick contacts, recent travel or rash.  In the ED, CXR was negative. BNP was 37. CCTA was done in the ed? showing noncalcified plaques in the second diagonal branch. Cardiology eval was done and according to them Shortness of Breath is not related to this plaque and othewr causes like CHF and PE should be investigated. His vitals have remained stable and he is breathing on room air. He never needed o2 this admission??  On my examination, he is resting comfortably breathing on room air.  With no complains. He says that he gets that he gets the episode where he gets spasms in the chest and feels Short of Breath for a minute or two, then it resolves by its own only to return in an hour (27-Jul-2020 19:09)    ENT CONSULT:  Pt is a 51 yo M with an extensive PMHx of b/l LE DVT s/p IVC filter, s/p thrombectomy, nephrolithiasis, lumbar disc herniation, GERD, HTN now admitted with shortness of breath. Pt states since Thursday he gets episodes where he feels like he is short of breath at rest, but then resolves on its own. Pt also reports feeling of "air stuck in throat" and states he feels a similar sensation when drinking cold water. Denies inability to control secretions or inability to tolerate PO intake. Pt denies odynophagia, dizziness, palpitations, fevers, chills, N/V.     PAST MEDICAL & SURGICAL HISTORY:  Lumbar disc herniation  Kidney stones  HTN (hypertension)  Herniated lumbar intervertebral disc: discectomy  Kidney stone: ureteroscopy  Thrombus: thrombectomy of left leg      MEDICATIONS  (STANDING):  amLODIPine   Tablet 5 milliGRAM(s) Oral daily  apixaban 5 milliGRAM(s) Oral every 12 hours  aspirin  chewable 81 milliGRAM(s) Oral daily  atorvastatin 40 milliGRAM(s) Oral at bedtime  lisinopril 5 milliGRAM(s) Oral daily  metoprolol tartrate 25 milliGRAM(s) Oral two times a day  multivitamin 1 Tablet(s) Oral daily  pantoprazole    Tablet 40 milliGRAM(s) Oral before breakfast  senna 2 Tablet(s) Oral at bedtime  tamsulosin 0.4 milliGRAM(s) Oral at bedtime    MEDICATIONS  (PRN):  furosemide   Injectable 40 milliGRAM(s) IV Push daily PRN SOB/LE EDEMA  iohexol 300 mG (iodine)/mL Oral Solution 30 milliLiter(s) Oral once PRN FOR BARIUM SWALLOW  methocarbamol 750 milliGRAM(s) Oral every 6 hours PRN MUSCLE SPASMS  oxycodone    5 mG/acetaminophen 325 mG 1 Tablet(s) Oral every 6 hours PRN Severe Pain (7 - 10)  polyethylene glycol 3350 17 Gram(s) Oral daily PRN Constipation  traMADol 25 milliGRAM(s) Oral every 8 hours PRN Moderate Pain (4 - 6)      Allergies  No Known Allergies    Intolerances    SOCIAL HISTORY:    FAMILY HISTORY:  No pertinent family history in first degree relatives      Review of Systems:  [X] A ten point review of systems was otherwise negative except as noted.    [ ] Due to altered mental status/ intubation, subjective information was not able to be obtained from the patient. History was obtained, to the extent possible, from review of the chart and collateral sources of information     Vital Signs Last 24 Hrs  T(C): 36.1 (28 Jul 2020 20:35), Max: 36.8 (27 Jul 2020 23:10)  T(F): 97 (28 Jul 2020 20:35), Max: 98.2 (27 Jul 2020 23:10)  HR: 75 (28 Jul 2020 20:35) (55 - 75)  BP: 115/60 (28 Jul 2020 20:35) (100/54 - 122/59)  RR: 18 (28 Jul 2020 20:35) (16 - 18)  SpO2: 96% (28 Jul 2020 18:15) (96% - 99%)    GEN:  NAD, awake and alert. No drooling or pooling of secretions. No stridor. Good vocal quality, no hoarseness.   NEURO: AAO3  HEAD: NC/AT  ENT: oral mucosa pink and moist; no edema or erythema to posterior OP; uvula midline; no FOM ttp   NECK: Traceha midline  RESP: Non-labored breathing  ABDO: Soft, NT  EXT: +edema to b/l LE    Fiberoptic Laryngoscopy: No masses or lesions noted to NP/OP/HP. + lingual hypertrophy. laryngeal structures intact, no edema or erythema noted. Epiglottis crisp, no edema. TVC/FVC mobile and intact, no glottic gap noted.     LABS:                        12.9   7.49  )-----------( 220      ( 28 Jul 2020 11:52 )             38.5     07-28    144  |  105  |  5<L>  ----------------------------<  89  3.9   |  26  |  0.6<L>    Ca    8.9      28 Jul 2020 11:52    TPro  6.1  /  Alb  3.8  /  TBili  0.5  /  DBili  x   /  AST  16  /  ALT  22  /  AlkPhos  76  07-28    PT/INR - ( 27 Jul 2020 08:16 )   PT: 15.80 sec;   INR: 1.37 ratio         PTT - ( 27 Jul 2020 08:16 )  PTT:34.0 sec

## 2020-07-28 NOTE — CONSULT NOTE ADULT - ATTENDING COMMENTS
LE edema and SOB at rest  No reported chest pain  h/o DVT on Eliquis  Anxiety?    Found to have severe D2 stenosis (NCP) otherwise normal coronaries, CACS 5 on CCTA - essentially an incidental finding and unrelated to his current symptoms    CXR:  No CHF    - Echo to evaluate LVEF  - Consider CTA to r/o PE  - Medical management of CAD with ASA and statin  - No urgent indication for cardiac cath at this time  - Consider Lasix 20 mg PO daily for edema
Seen and examined with the pulmonary fellow at the bed side.  Impression and plan as outlined above.

## 2020-07-28 NOTE — CONSULT NOTE ADULT - ASSESSMENT
Pt is a 49 yo M admitted for evaluation of SOB.     ·	No acute ENT intervention   ·	Cont to monitor O2 sat, supplemental O2 prn  ·	Cont with PPI for GERD   ·	Will d/w attng

## 2020-07-28 NOTE — CONSULT NOTE ADULT - SUBJECTIVE AND OBJECTIVE BOX
Patient is a 50y old  Male who presents with a chief complaint of Shortness of Breath (27 Jul 2020 17:47)      HPI:  51 Y/O M w/ pmhx of microdiscectomy 6/28 due to MRI showing L2-3 disc herniation, R peroneal and L PT DVT  [facility]follows up with Dr. Wu vascular presents with sob orthopnea. 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 No fever, chills, n/v, cp,  pleuritic cp, palpitations, diaphoresis, cough, ha/lh/dizziness, numbness/tingling, neck pain/ stiffness, abd pain, diarrhea, constipation, melena/brbpr, urinary symptoms, trauma, weakness, sick contacts, recent travel or rash.  In the ED, CXR was negative. BNP was 37. CCTA was done in the ed? showing noncalcified plaques in the second diagonal branch. Cardiology eval was done and according to them Shortness of Breath is not related to this plaque and othewr causes like CHF and PE should be investigated. His vitals have remained stable and he is breathing on room air. He never needed o2 this admission??  On my examination, he is resting comfortably breathing on room air.  With no complains. He says that he gets that he gets the episode where he gets spasms in the chest and feels Short of Breath for a minute or two, then it resolves by its own only to return in an hour. (27 Jul 2020 17:47)      PAST MEDICAL & SURGICAL HISTORY:  Lumbar disc herniation  Kidney stones  HTN (hypertension)  Herniated lumbar intervertebral disc: discectomy  Kidney stone: ureteroscopy  Thrombus: thrombectomy of left leg      SOCIAL HX:   Smoking                         ETOH                            Other    FAMILY HISTORY:  No pertinent family history in first degree relatives  .  No cardiovascular or pulmonary family history     REVIEW OF SYSTEMS:    CONSTITUTIONAL:   no fever   no chills.  no weight gain   no weight loss    EYES:   no discharge,   no visual changes.    ENT:   Ears: no ear pain and no hearing problems.  Nose: no nasal congestion and no nasal drainage.  Mouth/Throat: no dysphagia,  no hoarseness and no throat pain.  Neck: no lumps, no pain, no stiffness and no swollen glands.    CARDIOVASCULAR:   no chest pain,   no swelling  no palpitaions  no syncope    RESPIRATORY:  no SOB,  no wheezing ,  no respiratory difficulty  no sputum production    GASTROINTESTINAL:   no abdominal pain,   no constipation,   no diarrhea,   no vomiting.    GENITOURINARY:  no dysuria,   no hematuria.    MUSCULOSKELETAL:   no back pain,   no musculoskeletal pain,  no weakness.    SKIN:   no jaundice,    no rashes.    NEURO:   no loss of consciousness,   no headache,   no weakness.    PSYCHIATRIC:   no known mental health issues  no anxiety  no depression    ALLERGIC/IMMUNOLOGIC:   No active allergic or immunologic issues      Allergies    No Known Allergies    Intolerances      PHYSICAL EXAM  Vital Signs Last 24 Hrs  T(C): 36.4 (28 Jul 2020 08:47), Max: 36.8 (27 Jul 2020 23:10)  T(F): 97.5 (28 Jul 2020 08:47), Max: 98.2 (27 Jul 2020 23:10)  HR: 60 (28 Jul 2020 08:47) (55 - 81)  BP: 103/75 (28 Jul 2020 08:47) (103/75 - 122/59)  BP(mean): --  RR: 18 (28 Jul 2020 08:47) (16 - 18)  SpO2: 96% (28 Jul 2020 08:47) (96% - 99%)    CONSTITUTIONAL:  Well nourished.  NAD    ENT:   Airway patent,   No thrush    EYES:   Clear bilaterally,   pupils equal,   round and reactive to light.    CARDIAC:   Normal rate,   regular rhythm.    no edema      CAROTID:   normal systolic impulse  no bruits    RESPIRATORY:   No wheezing  Nnormal chest expansion  Not tachypneic,  No use of accessory muscles    GASTROINTESTINAL:  Abdomen soft,   non-tender,   no guarding,   + BS    GENITOURINARY  normal genitalia for sex  no edema    MUSCULOSKELETAL:   range of motion is not limited,  no clubbing, cyanosis    NEUROLOGICAL:   Alert and oriented   no motor deficits.  pertinent DTRs normal    SKIN:   Skin normal color for race,   No evidence of rash.    PSYCHIATRIC:   normal mood and affect.   no apparent risk to self or others.    HEME LYMPH:   No cervical  lymphadenopathy.  no inguinal lymphadenopathy          LABS:                          13.8   7.83  )-----------( 232      ( 27 Jul 2020 08:16 )             40.8                                               07-27    144  |  107  |  9<L>  ----------------------------<  117<H>  3.8   |  25  |  0.8    Ca    9.1      27 Jul 2020 08:16    TPro  7.3  /  Alb  4.4  /  TBili  0.5  /  DBili  x   /  AST  16  /  ALT  23  /  AlkPhos  82  07-27      PT/INR - ( 27 Jul 2020 08:16 )   PT: 15.80 sec;   INR: 1.37 ratio         PTT - ( 27 Jul 2020 08:16 )  PTT:34.0 sec                                           CARDIAC MARKERS ( 27 Jul 2020 13:23 )  x     / <0.01 ng/mL / x     / x     / x      CARDIAC MARKERS ( 27 Jul 2020 08:16 )  x     / <0.01 ng/mL / x     / x     / x                                                LIVER FUNCTIONS - ( 27 Jul 2020 08:16 )  Alb: 4.4 g/dL / Pro: 7.3 g/dL / ALK PHOS: 82 U/L / ALT: 23 U/L / AST: 16 U/L / GGT: x                                                                                                MEDICATIONS  (STANDING):  amLODIPine   Tablet 5 milliGRAM(s) Oral daily  apixaban 5 milliGRAM(s) Oral every 12 hours  aspirin  chewable 81 milliGRAM(s) Oral daily  atorvastatin 40 milliGRAM(s) Oral at bedtime  lisinopril 5 milliGRAM(s) Oral daily  metoprolol tartrate 25 milliGRAM(s) Oral two times a day  multivitamin 1 Tablet(s) Oral daily  pantoprazole    Tablet 40 milliGRAM(s) Oral before breakfast  senna 2 Tablet(s) Oral at bedtime  tamsulosin 0.4 milliGRAM(s) Oral at bedtime    MEDICATIONS  (PRN):  furosemide   Injectable 40 milliGRAM(s) IV Push daily PRN SOB/LE EDEMA  methocarbamol 750 milliGRAM(s) Oral every 6 hours PRN MUSCLE SPASMS  oxycodone    5 mG/acetaminophen 325 mG 1 Tablet(s) Oral every 6 hours PRN Severe Pain (7 - 10)  polyethylene glycol 3350 17 Gram(s) Oral daily PRN Constipation  traMADol 25 milliGRAM(s) Oral every 8 hours PRN Moderate Pain (4 - 6)      X-Rays reviewed:    CXR interpreted by me: Patient is a 50y old  Male who presents with a chief complaint of Shortness of Breath (27 Jul 2020 17:47)      HPI:  51 Y/O M w/ pmhx of microdiscectomy 6/28 due to MRI showing L2-3 disc herniation, R peroneal and L PT DVT  [facility]follows up with Dr. Wu vascular presents with sob orthopnea. 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 No fever, chills, n/v, cp,  pleuritic cp, palpitations, diaphoresis, cough, ha/lh/dizziness, numbness/tingling, neck pain/ stiffness, abd pain, diarrhea, constipation, melena/brbpr, urinary symptoms, trauma, weakness, sick contacts, recent travel or rash.  In the ED, CXR was negative. BNP was 37. CCTA was done in the ed? showing noncalcified plaques in the second diagonal branch. Cardiology eval was done and according to them Shortness of Breath is not related to this plaque and othewr causes like CHF and PE should be investigated. His vitals have remained stable and he is breathing on room air. He never needed o2 this admission??  On my examination, he is resting comfortably breathing on room air.  With no complains. He says that he gets that he gets the episode where he gets spasms in the chest and feels Short of Breath for a minute or two, then it resolves by its own only to return in an hour. (27 Jul 2020 17:47)      PAST MEDICAL & SURGICAL HISTORY:  Lumbar disc herniation  Kidney stones  HTN (hypertension)  Herniated lumbar intervertebral disc: discectomy  Kidney stone: ureteroscopy  Thrombus: thrombectomy of left leg      SOCIAL HX:  never smoker                         ETOH        social drinker                    Other    FAMILY HISTORY:  No pertinent family history in first degree relatives  .  No cardiovascular or pulmonary family history     REVIEW OF SYSTEMS:    CONSTITUTIONAL:   no fever   no chills.  no weight gain   no weight loss    EYES:   no discharge,   no visual changes.    ENT:   Ears: no ear pain and no hearing problems.  Nose: no nasal congestion and no nasal drainage.  Mouth/Throat: no dysphagia,  no hoarseness and no throat pain.  Neck: no lumps, no pain, no stiffness and no swollen glands.    CARDIOVASCULAR:   no chest pain,   no swelling  no palpitaions  no syncope    GASTROINTESTINAL:   no abdominal pain,   no constipation,   no diarrhea,   no vomiting.    GENITOURINARY:  no dysuria,   no hematuria.      SKIN:   no jaundice,    no rashes.    NEURO:   no loss of consciousness,   no headache,   no weakness.    PSYCHIATRIC:   no known mental health issues  no anxiety  no depression    ALLERGIC/IMMUNOLOGIC:   No active allergic or immunologic issues      Allergies    No Known Allergies    Intolerances      PHYSICAL EXAM  Vital Signs Last 24 Hrs  T(C): 36.4 (28 Jul 2020 08:47), Max: 36.8 (27 Jul 2020 23:10)  T(F): 97.5 (28 Jul 2020 08:47), Max: 98.2 (27 Jul 2020 23:10)  HR: 60 (28 Jul 2020 08:47) (55 - 81)  BP: 103/75 (28 Jul 2020 08:47) (103/75 - 122/59)  BP(mean): --  RR: 18 (28 Jul 2020 08:47) (16 - 18)  SpO2: 96% (28 Jul 2020 08:47) (96% - 99%)    CONSTITUTIONAL:  Well nourished.  NAD    ENT:   Airway patent,   No thrush    EYES:   Clear bilaterally,   pupils equal,   round and reactive to light.    CARDIAC:   Normal rate,   regular rhythm.    no edema      CAROTID:   normal systolic impulse  no bruits    RESPIRATORY:   b/l clear breath sounds; equal expansion; no wheeze or stridor    GASTROINTESTINAL:  Abdomen soft,   non-tender,   no guarding,   + BS    GENITOURINARY  normal genitalia for sex  no edema    MUSCULOSKELETAL:   range of motion is not limited,  no clubbing, cyanosis  trace edema in LE    NEUROLOGICAL:   non focal    SKIN:   intact    PSYCHIATRIC:   normal mood and affect.   no apparent risk to self or others.    HEME LYMPH:   No cervical  lymphadenopathy.  no inguinal lymphadenopathy          LABS:                          13.8   7.83  )-----------( 232      ( 27 Jul 2020 08:16 )             40.8                                               07-27    144  |  107  |  9<L>  ----------------------------<  117<H>  3.8   |  25  |  0.8    Ca    9.1      27 Jul 2020 08:16    TPro  7.3  /  Alb  4.4  /  TBili  0.5  /  DBili  x   /  AST  16  /  ALT  23  /  AlkPhos  82  07-27      PT/INR - ( 27 Jul 2020 08:16 )   PT: 15.80 sec;   INR: 1.37 ratio         PTT - ( 27 Jul 2020 08:16 )  PTT:34.0 sec                                           CARDIAC MARKERS ( 27 Jul 2020 13:23 )  x     / <0.01 ng/mL / x     / x     / x      CARDIAC MARKERS ( 27 Jul 2020 08:16 )  x     / <0.01 ng/mL / x     / x     / x                                                LIVER FUNCTIONS - ( 27 Jul 2020 08:16 )  Alb: 4.4 g/dL / Pro: 7.3 g/dL / ALK PHOS: 82 U/L / ALT: 23 U/L / AST: 16 U/L / GGT: x                                                                                              MEDICATIONS  (STANDING):  amLODIPine   Tablet 5 milliGRAM(s) Oral daily  apixaban 5 milliGRAM(s) Oral every 12 hours  aspirin  chewable 81 milliGRAM(s) Oral daily  atorvastatin 40 milliGRAM(s) Oral at bedtime  lisinopril 5 milliGRAM(s) Oral daily  metoprolol tartrate 25 milliGRAM(s) Oral two times a day  multivitamin 1 Tablet(s) Oral daily  pantoprazole    Tablet 40 milliGRAM(s) Oral before breakfast  senna 2 Tablet(s) Oral at bedtime  tamsulosin 0.4 milliGRAM(s) Oral at bedtime    MEDICATIONS  (PRN):  furosemide   Injectable 40 milliGRAM(s) IV Push daily PRN SOB/LE EDEMA  methocarbamol 750 milliGRAM(s) Oral every 6 hours PRN MUSCLE SPASMS  oxycodone    5 mG/acetaminophen 325 mG 1 Tablet(s) Oral every 6 hours PRN Severe Pain (7 - 10)  polyethylene glycol 3350 17 Gram(s) Oral daily PRN Constipation  traMADol 25 milliGRAM(s) Oral every 8 hours PRN Moderate Pain (4 - 6)      X-Rays: NAD Patient is a 50y old  Male who presents with a chief complaint of Shortness of Breath (27 Jul 2020 17:47)      HPI:  51 Y/O M w/ pmhx of microdiscectomy 6/28 due to MRI showing L2-3 disc herniation, R peroneal and L PT DVT  [facility]follows up with Dr. Wu vascular presents with sob orthopnea. 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 No fever, chills, n/v, cp,  pleuritic cp, palpitations, diaphoresis, cough, ha/lh/dizziness, numbness/tingling, neck pain/ stiffness, abd pain, diarrhea, constipation, melena/brbpr, urinary symptoms, trauma, weakness, sick contacts, recent travel or rash.  In the ED, CXR was negative. BNP was 37. CCTA was done in the ed? showing noncalcified plaques in the second diagonal branch. Cardiology eval was done and according to them Shortness of Breath is not related to this plaque and othewr causes like CHF and PE should be investigated. His vitals have remained stable and he is breathing on room air. He never needed o2 this admission??  On my examination, he is resting comfortably breathing on room air.  With no complains. He says that he gets that he gets the episode where he gets spasms in the chest and feels Short of Breath for a minute or two, then it resolves by its own only to return in an hour. (27 Jul 2020 17:47)      PAST MEDICAL & SURGICAL HISTORY:  Lumbar disc herniation  Kidney stones  HTN (hypertension)  Herniated lumbar intervertebral disc: discectomy  Kidney stone: ureteroscopy  Thrombus: thrombectomy of left leg      SOCIAL HX:  never smoker                         ETOH        social drinker                    Other    FAMILY HISTORY:  No pertinent family history in first degree relatives  .  No cardiovascular or pulmonary family history     REVIEW OF SYSTEMS:    CONSTITUTIONAL:   no fever   no chills.  no weight gain   no weight loss    EYES:   no discharge,   no visual changes.    ENT:   Ears: no ear pain and no hearing problems.  Nose: no nasal congestion and no nasal drainage.  Mouth/Throat: no dysphagia,  no hoarseness and no throat pain.  Neck: no lumps, no pain, no stiffness and no swollen glands.    CARDIOVASCULAR:   no chest pain,   no swelling  no palpitaions  no syncope    GASTROINTESTINAL:   no abdominal pain,   no constipation,   no diarrhea,   no vomiting.    GENITOURINARY:  no dysuria,   no hematuria.      SKIN:   no jaundice,    no rashes.    NEURO:   no loss of consciousness,   no headache,   no weakness.    PSYCHIATRIC:   no known mental health issues  no anxiety  no depression    ALLERGIC/IMMUNOLOGIC:   No active allergic or immunologic issues      Allergies    No Known Allergies    Intolerances      PHYSICAL EXAM  Vital Signs Last 24 Hrs  T(C): 36.4 (28 Jul 2020 08:47), Max: 36.8 (27 Jul 2020 23:10)  T(F): 97.5 (28 Jul 2020 08:47), Max: 98.2 (27 Jul 2020 23:10)  HR: 60 (28 Jul 2020 08:47) (55 - 81)  BP: 103/75 (28 Jul 2020 08:47) (103/75 - 122/59)  BP(mean): --  RR: 18 (28 Jul 2020 08:47) (16 - 18)  SpO2: 96% (28 Jul 2020 08:47) (96% - 99%)    CONSTITUTIONAL:  Well nourished.  NAD    ENT:   Airway patent,   No thrush    EYES:   Clear bilaterally,   pupils equal,   round and reactive to light.    CARDIAC:   Normal rate,   regular rhythm.    no edema      CAROTID:   normal systolic impulse  no bruits    RESPIRATORY:   b/l clear breath sounds; equal expansion; no wheeze or stridor    GASTROINTESTINAL:  Abdomen soft,   non-tender,   no guarding,   + BS    GENITOURINARY  normal genitalia for sex  no edema    MUSCULOSKELETAL:   range of motion is not limited,  no clubbing, cyanosis  trace edema in LE    NEUROLOGICAL:   non focal    SKIN:   intact    PSYCHIATRIC:   normal mood and affect.   no apparent risk to self or others.    HEME LYMPH:   No cervical  lymphadenopathy.  no inguinal lymphadenopathy          LABS:                          13.8   7.83  )-----------( 232      ( 27 Jul 2020 08:16 )             40.8                                               07-27    144  |  107  |  9<L>  ----------------------------<  117<H>  3.8   |  25  |  0.8    Ca    9.1      27 Jul 2020 08:16    TPro  7.3  /  Alb  4.4  /  TBili  0.5  /  DBili  x   /  AST  16  /  ALT  23  /  AlkPhos  82  07-27      PT/INR - ( 27 Jul 2020 08:16 )   PT: 15.80 sec;   INR: 1.37 ratio         PTT - ( 27 Jul 2020 08:16 )  PTT:34.0 sec                                           CARDIAC MARKERS ( 27 Jul 2020 13:23 )  x     / <0.01 ng/mL / x     / x     / x      CARDIAC MARKERS ( 27 Jul 2020 08:16 )  x     / <0.01 ng/mL / x     / x     / x                                                LIVER FUNCTIONS - ( 27 Jul 2020 08:16 )  Alb: 4.4 g/dL / Pro: 7.3 g/dL / ALK PHOS: 82 U/L / ALT: 23 U/L / AST: 16 U/L / GGT: x                                                                                              MEDICATIONS  (STANDING):  amLODIPine   Tablet 5 milliGRAM(s) Oral daily  apixaban 5 milliGRAM(s) Oral every 12 hours  aspirin  chewable 81 milliGRAM(s) Oral daily  atorvastatin 40 milliGRAM(s) Oral at bedtime  lisinopril 5 milliGRAM(s) Oral daily  metoprolol tartrate 25 milliGRAM(s) Oral two times a day  multivitamin 1 Tablet(s) Oral daily  pantoprazole    Tablet 40 milliGRAM(s) Oral before breakfast  senna 2 Tablet(s) Oral at bedtime  tamsulosin 0.4 milliGRAM(s) Oral at bedtime    MEDICATIONS  (PRN):  furosemide   Injectable 40 milliGRAM(s) IV Push daily PRN SOB/LE EDEMA  methocarbamol 750 milliGRAM(s) Oral every 6 hours PRN MUSCLE SPASMS  oxycodone    5 mG/acetaminophen 325 mG 1 Tablet(s) Oral every 6 hours PRN Severe Pain (7 - 10)  polyethylene glycol 3350 17 Gram(s) Oral daily PRN Constipation  traMADol 25 milliGRAM(s) Oral every 8 hours PRN Moderate Pain (4 - 6)      X-Rays: NAD    VBG - ( 27 Jul 2020 07:44 )  pH: 7.41  /  pCO2: 42    /  pO2: 50    / HCO3: 26    / Base Excess: 1.3   /  SvO2: 85    / Lactate: 0.8

## 2020-07-29 ENCOUNTER — TRANSCRIPTION ENCOUNTER (OUTPATIENT)
Age: 50
End: 2020-07-29

## 2020-07-29 VITALS
HEART RATE: 74 BPM | RESPIRATION RATE: 18 BRPM | SYSTOLIC BLOOD PRESSURE: 107 MMHG | TEMPERATURE: 98 F | DIASTOLIC BLOOD PRESSURE: 63 MMHG

## 2020-07-29 LAB
ALBUMIN SERPL ELPH-MCNC: 3.9 G/DL — SIGNIFICANT CHANGE UP (ref 3.5–5.2)
ALP SERPL-CCNC: 79 U/L — SIGNIFICANT CHANGE UP (ref 30–115)
ALT FLD-CCNC: 19 U/L — SIGNIFICANT CHANGE UP (ref 0–41)
ANION GAP SERPL CALC-SCNC: 13 MMOL/L — SIGNIFICANT CHANGE UP (ref 7–14)
AST SERPL-CCNC: 16 U/L — SIGNIFICANT CHANGE UP (ref 0–41)
BASOPHILS # BLD AUTO: 0.03 K/UL — SIGNIFICANT CHANGE UP (ref 0–0.2)
BASOPHILS NFR BLD AUTO: 0.4 % — SIGNIFICANT CHANGE UP (ref 0–1)
BILIRUB SERPL-MCNC: 0.4 MG/DL — SIGNIFICANT CHANGE UP (ref 0.2–1.2)
BUN SERPL-MCNC: 10 MG/DL — SIGNIFICANT CHANGE UP (ref 10–20)
CALCIUM SERPL-MCNC: 9.2 MG/DL — SIGNIFICANT CHANGE UP (ref 8.5–10.1)
CHLORIDE SERPL-SCNC: 105 MMOL/L — SIGNIFICANT CHANGE UP (ref 98–110)
CO2 SERPL-SCNC: 26 MMOL/L — SIGNIFICANT CHANGE UP (ref 17–32)
CREAT SERPL-MCNC: 0.6 MG/DL — LOW (ref 0.7–1.5)
EOSINOPHIL # BLD AUTO: 0.25 K/UL — SIGNIFICANT CHANGE UP (ref 0–0.7)
EOSINOPHIL NFR BLD AUTO: 3.1 % — SIGNIFICANT CHANGE UP (ref 0–8)
GLUCOSE SERPL-MCNC: 97 MG/DL — SIGNIFICANT CHANGE UP (ref 70–99)
HCT VFR BLD CALC: 39 % — LOW (ref 42–52)
HGB BLD-MCNC: 12.9 G/DL — LOW (ref 14–18)
IMM GRANULOCYTES NFR BLD AUTO: 0.9 % — HIGH (ref 0.1–0.3)
LYMPHOCYTES # BLD AUTO: 3.17 K/UL — SIGNIFICANT CHANGE UP (ref 1.2–3.4)
LYMPHOCYTES # BLD AUTO: 39.4 % — SIGNIFICANT CHANGE UP (ref 20.5–51.1)
MCHC RBC-ENTMCNC: 29 PG — SIGNIFICANT CHANGE UP (ref 27–31)
MCHC RBC-ENTMCNC: 33.1 G/DL — SIGNIFICANT CHANGE UP (ref 32–37)
MCV RBC AUTO: 87.6 FL — SIGNIFICANT CHANGE UP (ref 80–94)
MONOCYTES # BLD AUTO: 0.6 K/UL — SIGNIFICANT CHANGE UP (ref 0.1–0.6)
MONOCYTES NFR BLD AUTO: 7.5 % — SIGNIFICANT CHANGE UP (ref 1.7–9.3)
NEUTROPHILS # BLD AUTO: 3.93 K/UL — SIGNIFICANT CHANGE UP (ref 1.4–6.5)
NEUTROPHILS NFR BLD AUTO: 48.7 % — SIGNIFICANT CHANGE UP (ref 42.2–75.2)
NRBC # BLD: 0 /100 WBCS — SIGNIFICANT CHANGE UP (ref 0–0)
PLATELET # BLD AUTO: 226 K/UL — SIGNIFICANT CHANGE UP (ref 130–400)
POTASSIUM SERPL-MCNC: 4.1 MMOL/L — SIGNIFICANT CHANGE UP (ref 3.5–5)
POTASSIUM SERPL-SCNC: 4.1 MMOL/L — SIGNIFICANT CHANGE UP (ref 3.5–5)
PROT SERPL-MCNC: 6.1 G/DL — SIGNIFICANT CHANGE UP (ref 6–8)
RBC # BLD: 4.45 M/UL — LOW (ref 4.7–6.1)
RBC # FLD: 13.5 % — SIGNIFICANT CHANGE UP (ref 11.5–14.5)
SODIUM SERPL-SCNC: 144 MMOL/L — SIGNIFICANT CHANGE UP (ref 135–146)
WBC # BLD: 8.05 K/UL — SIGNIFICANT CHANGE UP (ref 4.8–10.8)
WBC # FLD AUTO: 8.05 K/UL — SIGNIFICANT CHANGE UP (ref 4.8–10.8)

## 2020-07-29 PROCEDURE — 99238 HOSP IP/OBS DSCHRG MGMT 30/<: CPT

## 2020-07-29 RX ORDER — APIXABAN 2.5 MG/1
1 TABLET, FILM COATED ORAL
Qty: 60 | Refills: 0
Start: 2020-07-29 | End: 2020-08-27

## 2020-07-29 RX ORDER — ATORVASTATIN CALCIUM 80 MG/1
1 TABLET, FILM COATED ORAL
Qty: 30 | Refills: 0
Start: 2020-07-29 | End: 2020-08-27

## 2020-07-29 RX ORDER — FUROSEMIDE 40 MG
1 TABLET ORAL
Qty: 30 | Refills: 0
Start: 2020-07-29 | End: 2020-08-27

## 2020-07-29 RX ORDER — AMLODIPINE BESYLATE 2.5 MG/1
1 TABLET ORAL
Qty: 30 | Refills: 0
Start: 2020-07-29 | End: 2020-08-27

## 2020-07-29 RX ORDER — ASPIRIN/CALCIUM CARB/MAGNESIUM 324 MG
1 TABLET ORAL
Qty: 30 | Refills: 0
Start: 2020-07-29 | End: 2020-08-27

## 2020-07-29 RX ADMIN — PANTOPRAZOLE SODIUM 40 MILLIGRAM(S): 20 TABLET, DELAYED RELEASE ORAL at 05:28

## 2020-07-29 RX ADMIN — Medication 1 TABLET(S): at 11:52

## 2020-07-29 RX ADMIN — Medication 25 MILLIGRAM(S): at 05:28

## 2020-07-29 RX ADMIN — LISINOPRIL 5 MILLIGRAM(S): 2.5 TABLET ORAL at 05:28

## 2020-07-29 RX ADMIN — Medication 81 MILLIGRAM(S): at 11:52

## 2020-07-29 RX ADMIN — AMLODIPINE BESYLATE 5 MILLIGRAM(S): 2.5 TABLET ORAL at 05:28

## 2020-07-29 RX ADMIN — APIXABAN 5 MILLIGRAM(S): 2.5 TABLET, FILM COATED ORAL at 05:28

## 2020-07-29 NOTE — SWALLOW BEDSIDE ASSESSMENT ADULT - SLP PERTINENT HISTORY OF CURRENT PROBLEM
50 year old M p/w c/o subjective SOB on RA Pt was sating 96-98% SPO2. CXR negative,  Pt was seen by ENT, + lingual hypertrophy recorded in their note, otherwise unremarkable exam. Esophagram completed 7/28 unremarkable.  Pt rec for O/P sleep study, Pulmonary suspects Obstructive sleep apnea. PMHX: microdisectomy 6/28/2020 due to MRI showing L 2- L3 disc herniation

## 2020-07-29 NOTE — PROGRESS NOTE ADULT - ATTENDING COMMENTS
Pt seen and examined independently. He c/o some SOB but appears in NAD in the wheelchair and is able to speak in full sentences. Also c/o sensation in his throat when he lies down. He is wheelchair bound but able to transfer.  He is slowly improving with physical therapy - now able to move his toes.  passed for regular diet with thin liquids.  Esophagram unremarkable and ENT did not see any abnormalities.  He has LE edema - likely dependent due to limited mobility.  No evidence of CHF.  Medical management for CAD per cardio.  Needs workup to rule out BRISA as outpt (sleep study).  BP on the lower end - do not see need for amlodipine, metoprolol and lisinopril.  Start lasix 20mg daily for edema  Dyspnea is nonspecific - no clear etiology at this time -pulse ox acceptable on room air  further outpt Pulm workup  Continue PPI for GERD    Above discussed with the pt and he is comfortable in going home today.  outpt f/u with PMD and Pulm.    I discussed the case with the resident and I reviewed his note.  Agree with the physical exam, assessment and plan with additions as above.     Medication reconciliation will be reviewed.

## 2020-07-29 NOTE — PROGRESS NOTE ADULT - SUBJECTIVE AND OBJECTIVE BOX
SUBJECTIVE:    Patient is a 50y old Male who presents with a chief complaint of Shortness of Breath (27 Jul 2020 17:47)    Currently admitted to medicine with the primary diagnosis of CAD (coronary artery disease)     Today is hospital day 1d. This morning he is resting comfortably in bed and reports no new issues or overnight events.     The patient reports that he feels like "air gets stuck in my throat and I have trouble swallowing liquids and also chest spasms." The patient denies dysphagia to solids. He is saturating 100 % on room air.     ROS:     negative except as stated above     PAST MEDICAL & SURGICAL HISTORY  Lumbar disc herniation  Kidney stones  HTN (hypertension)  Herniated lumbar intervertebral disc: discectomy  Kidney stone: ureteroscopy  Thrombus: thrombectomy of left leg      ALLERGIES:  No Known Allergies    MEDICATIONS:  STANDING MEDICATIONS  amLODIPine   Tablet 5 milliGRAM(s) Oral daily  apixaban 5 milliGRAM(s) Oral every 12 hours  aspirin  chewable 81 milliGRAM(s) Oral daily  atorvastatin 40 milliGRAM(s) Oral at bedtime  lisinopril 5 milliGRAM(s) Oral daily  metoprolol tartrate 25 milliGRAM(s) Oral two times a day  multivitamin 1 Tablet(s) Oral daily  pantoprazole    Tablet 40 milliGRAM(s) Oral before breakfast  senna 2 Tablet(s) Oral at bedtime  tamsulosin 0.4 milliGRAM(s) Oral at bedtime    PRN MEDICATIONS  furosemide   Injectable 40 milliGRAM(s) IV Push daily PRN  methocarbamol 750 milliGRAM(s) Oral every 6 hours PRN  oxycodone    5 mG/acetaminophen 325 mG 1 Tablet(s) Oral every 6 hours PRN  polyethylene glycol 3350 17 Gram(s) Oral daily PRN  traMADol 25 milliGRAM(s) Oral every 8 hours PRN    VITALS:   T(F): 97.5  HR: 60  BP: 103/75  RR: 18  SpO2: 96%    LABS:                          13.8   7.83  )-----------( 232      ( 27 Jul 2020 08:16 )             40.8     07-27    144  |  107  |  9<L>  ----------------------------<  117<H>  3.8   |  25  |  0.8    Ca    9.1      27 Jul 2020 08:16    TPro  7.3  /  Alb  4.4  /  TBili  0.5  /  DBili  x   /  AST  16  /  ALT  23  /  AlkPhos  82  07-27    PT/INR - ( 27 Jul 2020 08:16 )   PT: 15.80 sec;   INR: 1.37 ratio         PTT - ( 27 Jul 2020 08:16 )  PTT:34.0 sec      Troponin T, Serum: <0.01 ng/mL (07-27-20 @ 13:23)      CARDIAC MARKERS ( 27 Jul 2020 13:23 )  x     / <0.01 ng/mL / x     / x     / x      CARDIAC MARKERS ( 27 Jul 2020 08:16 )  x     / <0.01 ng/mL / x     / x     / x          RADIOLOGY:    PHYSICAL EXAM:  GEN: No acute distress  HEENT: normocephalic, atraumatic, aniceteric  LUNGS: Clear to auscultation bilaterally, no rales/wheezing/ rhonchi , NO STRIDOR   HEART: S1/S2 present. RRR, no murmurs  ABD: Soft, non-tender, non-distended. Bowel sounds present  EXT: NC/NC/NE/2+PP/TINEO , CHRONIC B/L LE VENOUS STASIS   NEURO: AAOX3, normal affect    ASSESSMENT AND PLAN:    49 Y/O M w/ pmhx of microdiscectomy 6/28 due to MRI showing L2-3 disc herniation, R peroneal and L PT DVT  s/p IVC filter on 6/28 on [facility] (admission from 6/26 to July 20th),follows up with Dr. Wu vascular presents with subjective sob , orthopnea, and difficulty swallowing liquids.     # Shortness of breath   # Difficulty swallowing liquids / Chest spasms  -SOB is very Subjective, comes in episode with chest spasms. SpO2 100% on room air   -denies choking   -CCTA second diagonal plaque -Cardiology not impressed as a cause of SOB  -Duplex negative for DVT  -BNP 37 in a non-obese patient - rules out CHF  - Unlikely PE as patient is on room air, with stable vitals, has IVC filter and is on Eliquis , will hold off on CTA   - r/o esophageal disorder, f/u Barium Swallow   - Pulmonary consult  ? BRISA     # h/o Provoked DVT  -Minimally mobile state due to wheelchair bound due to Lumbar spinal issues  -Duplex negative this admission has IVC filter ( 6/28)  -Continue Eliquis 5 mg bid     # h/o Lumbar Laminectomy (6/28)  -Pain meds PRN    # h/o HTN  - c/w  amlodipine    Diet -DASH   Activity - Wheelchair   DVT -On Eliquis  GI -PPI  FULL CODE    Provider handoff: pulm c/s, barium swallow
Patient is a 50y old Male who presents with a chief complaint of Shortness of Breath (29 Jul 2020 06:48)    No acute events overnight. Patient still complains of SOB. Denies chest pain, SOB, N/V/D,      Allergies  No Known Allergies      PAST MEDICAL & SURGICAL HISTORY:  Lumbar disc herniation  Kidney stones  HTN (hypertension)  Herniated lumbar intervertebral disc: discectomy  Kidney stone: ureteroscopy  Thrombus: thrombectomy of left leg      PHYSICAL EXAM  Vital Signs Last 24 Hrs  T(C): 36.3 (29 Jul 2020 05:41), Max: 36.3 (29 Jul 2020 05:41)  T(F): 97.3 (29 Jul 2020 05:41), Max: 97.3 (29 Jul 2020 05:41)  HR: 62 (29 Jul 2020 05:41) (62 - 75)  BP: 111/64 (29 Jul 2020 05:41) (100/54 - 115/60)  BP(mean): --  RR: 20 (29 Jul 2020 05:41) (18 - 20)  SpO2: 96% (29 Jul 2020 06:25) (96% - 99%)    I&O's Summary    28 Jul 2020 07:01  -  29 Jul 2020 07:00  --------------------------------------------------------  IN: 120 mL / OUT: 700 mL / NET: -580 mL    29 Jul 2020 07:01  -  29 Jul 2020 08:59  --------------------------------------------------------  IN: 60 mL / OUT: 120 mL / NET: -60 mL      GENERAL: No acute distress, obese  HEAD:  Atraumatic, Normocephalic  ENT: PERRL, conjunctiva and sclera clear, neck supple, no JVD  CHEST/LUNG: Clear to auscultation bilaterally; No wheeze, equal breath sounds bilaterally, respirations nonlabored  HEART: Regular rate and rhythm; No murmurs, rubs, or gallops  ABDOMEN: Soft, nontender, nondistended;  EXTREMITIES:  +1 pitting edema  PSYCH: Nl behavior, nl affect  NEUROLOGY: AAOx3  SKIN: Normal color, No rashes or lesions      LABS                        12.9   8.05  )-----------( 226      ( 29 Jul 2020 05:33 )             39.0     Hemoglobin: 12.9 g/dL (07-29 @ 05:33)  Hemoglobin: 12.9 g/dL (07-28 @ 11:52)  Hemoglobin: 13.8 g/dL (07-27 @ 08:16)    CBC Full  -  ( 29 Jul 2020 05:33 )  WBC Count : 8.05 K/uL  RBC Count : 4.45 M/uL  Hemoglobin : 12.9 g/dL  Hematocrit : 39.0 %  Platelet Count - Automated : 226 K/uL  Mean Cell Volume : 87.6 fL  Mean Cell Hemoglobin : 29.0 pg  Mean Cell Hemoglobin Concentration : 33.1 g/dL  Auto Neutrophil # : 3.93 K/uL  Auto Lymphocyte # : 3.17 K/uL  Auto Monocyte # : 0.60 K/uL  Auto Eosinophil # : 0.25 K/uL  Auto Basophil # : 0.03 K/uL  Auto Neutrophil % : 48.7 %  Auto Lymphocyte % : 39.4 %  Auto Monocyte % : 7.5 %  Auto Eosinophil % : 3.1 %  Auto Basophil % : 0.4 %    07-29    144  |  105  |  10  ----------------------------<  97  4.1   |  26  |  0.6<L>    Ca    9.2      29 Jul 2020 05:33    TPro  6.1  /  Alb  3.9  /  TBili  0.4  /  DBili  x   /  AST  16  /  ALT  19  /  AlkPhos  79  07-29    Creatinine Trend: 0.6<--, 0.6<--, 0.8<--, 0.7<--, 0.7<--, 0.6<--  LIVER FUNCTIONS - ( 29 Jul 2020 05:33 )  Alb: 3.9 g/dL / Pro: 6.1 g/dL / ALK PHOS: 79 U/L / ALT: 19 U/L / AST: 16 U/L / GGT: x             CARDIAC MARKERS ( 27 Jul 2020 13:23 )  x     / <0.01 ng/mL / x     / x     / x            IMAGING    Xray Esophagram Single Contrast (07.28.20 @ 20:18)    FINDINGS:  The swallowing mechanism is intact.  Barium transverses the esophagus with no obstruction. There is no evidence of mass or ulceration.  There is no evidence of significant hiatal hernia.  There is no evidence of significant gastroesophageal reflux.  Contrast is seen filling the stomach.    IMPRESSION:  Unremarkable esophagram.

## 2020-07-29 NOTE — DISCHARGE NOTE PROVIDER - HOSPITAL COURSE
49yo M w/ pmhx of L2-3 disc herniation s/p microdiscectomy 6/28 (primarily wheelchair-bound), R peroneal and L PT DVT s/p IVC filter on 6/28 on [facility] (admission from 6/26 to 7/20), follows up with Dr. Wu (vascular) presents with sob/orthopnea. 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 No fever, chills, n/v, cp,  pleuritic cp, palpitations, diaphoresis, cough, ha/lh/dizziness, numbness/tingling, neck pain/ stiffness, abd pain, diarrhea, constipation, melena/brbpr, urinary symptoms, trauma, weakness, sick contacts, recent travel or rash.    In the ED, CXR was negative. BNP was 37. CCTA was performed, showing noncalcified plaques in the second diagonal branch. Per Cardiology, likely not a cause of patient's SOB and other causes like CHF and PE should be investigated. His vitals have remained stable and he is breathing on room air.        Pulmonology was consulted and recommended PFT and ENT consult.         ENT evaluated patient and recommended no acute intervention.        An esophagram was performed and was unremarkable.        At time of discharge patient is stable and no longer complaining of shortness of breath. He will follow up with Pulmonology outpatient 49yo M w/ pmhx of L2-3 disc herniation s/p microdiscectomy 6/28 (primarily wheelchair-bound), R peroneal and L PT DVT s/p IVC filter on 6/28 on [facility] (admission from 6/26 to 7/20), follows up with Dr. Wu (vascular) presents with sob/orthopnea. 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 No fever, chills, n/v, cp,  pleuritic cp, palpitations, diaphoresis, cough, ha/lh/dizziness, numbness/tingling, neck pain/ stiffness, abd pain, diarrhea, constipation, melena/brbpr, urinary symptoms, trauma, weakness, sick contacts, recent travel or rash.    In the ED, CXR was negative. BNP was 37. CCTA was performed, showing noncalcified plaques in the second diagonal branch. Per Cardiology, likely not a cause of patient's SOB and other causes like CHF and PE should be investigated. His vitals have remained stable and he is breathing on room air.    No evidence of CHF - pt with LE edema likely due to decreased mobility. Will be discharged on lasix PO.        Pulmonology was consulted and recommended PFT and ENT consult.         ENT evaluated patient and recommended no acute intervention.        An esophagram was performed and was unremarkable.        At time of discharge patient is stable and no longer complaining of shortness of breath. He will follow up with Pulmonology outpatient    Needs sleep study to rule out BRISA.

## 2020-07-29 NOTE — DISCHARGE NOTE PROVIDER - NSDCCPCAREPLAN_GEN_ALL_CORE_FT
PRINCIPAL DISCHARGE DIAGNOSIS  Diagnosis: Obstructive sleep apnea  Assessment and Plan of Treatment: Sleep apnea is a condition where the muscles and soft tissue at the back of the throat relax too much and collapse while you're sleeping. This allows the soft tissues and the tongue to block your airway and your breathing. When the oxygen level in your blood starts to drop, your brain signals you to wake briefly and start breathing again. This cycle happens over and over throughout the night—often you don't even realize it is happening.  Daytime sleepiness, forgetfulness and irritability are just some of the ways sleep apnea may affect your health and well being. Besides making sleep difficult, it can lead to high blood pressure, heart disease, stroke, diabetes and result in early death.  The most common risk factor is obesity because fatty tissue in your breathing passage reduces the space for air to pass through. This makes it easier for your breathing passage to collapse while you sleep. However, people who aren't overweight can have sleep apnea.  Adult sleep apnea becomes more frequent as you age, starting in young adulthood until you are in your 60s and 70s. It has also been found that sleep apnea is two to three times more common for men than premenopausal women. Postmenopausal women have a similar risk for obstructive sleep apnea as men.  If your doctor thinks sleep apnea is likely, you may need a sleep study at home or at a sleep center. A sleep study monitors and records your breathing, heart rate and oxygen levels overnight. Regardless of if it is completed at home or in a sleep lab, this non-invasive test will use sensors attached to your head and body connected by long wires to a computer. The results, which may include measuring heart, lung, brain activity, breathing patterns, arm and leg movements and oxygen levels while asleep will help your doctor make a diagnosis of sleep apnea. A sleep study completed in a sleep center will provide your doctor with more information than can be collected while using a portable sleep apnea test at home.      SECONDARY DISCHARGE DIAGNOSES  Diagnosis: SOB (shortness of breath) on exertion  Assessment and Plan of Treatment: Shortness of breath  Shortness of breath (dyspnea) means you have trouble breathing and could indicate a medical problem. Causes include lung disease, heart disease, low amount of red blood cells (anemia), poor physical fitness, being overweight, smoking, etc. Your health care provider today may not be able to find a cause for your shortness of breath after your exam. In this case, it is important to have a follow-up exam with your primary care physician as instructed. If medicines were prescribed, take them as directed for the full length of time directed. Refrain from tobacco products.  SEEK IMMEDIATE MEDICAL CARE IF YOU HAVE ANY OF THE FOLLOWING SYMPTOMS: worsening shortness of breath, chest pain, back pain, abdominal pain, fever, coughing up blood, lightheadedness/dizziness. PRINCIPAL DISCHARGE DIAGNOSIS  Diagnosis: Obstructive sleep apnea  Assessment and Plan of Treatment: Sleep apnea is a condition where the muscles and soft tissue at the back of the throat relax too much and collapse while you're sleeping. This allows the soft tissues and the tongue to block your airway and your breathing. When the oxygen level in your blood starts to drop, your brain signals you to wake briefly and start breathing again. This cycle happens over and over throughout the night—often you don't even realize it is happening.  Daytime sleepiness, forgetfulness and irritability are just some of the ways sleep apnea may affect your health and well being. Besides making sleep difficult, it can lead to high blood pressure, heart disease, stroke, diabetes and result in early death.  The most common risk factor is obesity because fatty tissue in your breathing passage reduces the space for air to pass through. This makes it easier for your breathing passage to collapse while you sleep. However, people who aren't overweight can have sleep apnea.  Adult sleep apnea becomes more frequent as you age, starting in young adulthood until you are in your 60s and 70s. It has also been found that sleep apnea is two to three times more common for men than premenopausal women. Postmenopausal women have a similar risk for obstructive sleep apnea as men.  If your doctor thinks sleep apnea is likely, you may need a sleep study at home or at a sleep center. A sleep study monitors and records your breathing, heart rate and oxygen levels overnight. Regardless of if it is completed at home or in a sleep lab, this non-invasive test will use sensors attached to your head and body connected by long wires to a computer. The results, which may include measuring heart, lung, brain activity, breathing patterns, arm and leg movements and oxygen levels while asleep will help your doctor make a diagnosis of sleep apnea. A sleep study completed in a sleep center will provide your doctor with more information than can be collected while using a portable sleep apnea test at home.      SECONDARY DISCHARGE DIAGNOSES  Diagnosis: SOB (shortness of breath) on exertion  Assessment and Plan of Treatment: Shortness of breath  Shortness of breath (dyspnea) means you have trouble breathing and could indicate a medical problem. Causes include lung disease, heart disease, low amount of red blood cells (anemia), poor physical fitness, being overweight, smoking, etc. Your health care provider today may not be able to find a cause for your shortness of breath after your exam. In this case, it is important to have a follow-up exam with your primary care physician as instructed. If medicines were prescribed, take them as directed for the full length of time directed. Refrain from tobacco products.  SEEK IMMEDIATE MEDICAL CARE IF YOU HAVE ANY OF THE FOLLOWING SYMPTOMS: worsening shortness of breath, chest pain, back pain, abdominal pain, fever, coughing up blood, lightheadedness/dizziness.  Please continue on the medications sent to the pharmacy, you were started on lasix 20 mg by mouth for your swelling. PRINCIPAL DISCHARGE DIAGNOSIS  Diagnosis: Obstructive sleep apnea  Assessment and Plan of Treatment: Sleep apnea is a condition where the muscles and soft tissue at the back of the throat relax too much and collapse while you're sleeping. This allows the soft tissues and the tongue to block your airway and your breathing. When the oxygen level in your blood starts to drop, your brain signals you to wake briefly and start breathing again. This cycle happens over and over throughout the night—often you don't even realize it is happening.  Daytime sleepiness, forgetfulness and irritability are just some of the ways sleep apnea may affect your health and well being. Besides making sleep difficult, it can lead to high blood pressure, heart disease, stroke, diabetes and result in early death.  The most common risk factor is obesity because fatty tissue in your breathing passage reduces the space for air to pass through. This makes it easier for your breathing passage to collapse while you sleep. However, people who aren't overweight can have sleep apnea.  Adult sleep apnea becomes more frequent as you age, starting in young adulthood until you are in your 60s and 70s. It has also been found that sleep apnea is two to three times more common for men than premenopausal women. Postmenopausal women have a similar risk for obstructive sleep apnea as men.  If your doctor thinks sleep apnea is likely, you may need a sleep study at home or at a sleep center. A sleep study monitors and records your breathing, heart rate and oxygen levels overnight. Regardless of if it is completed at home or in a sleep lab, this non-invasive test will use sensors attached to your head and body connected by long wires to a computer. The results, which may include measuring heart, lung, brain activity, breathing patterns, arm and leg movements and oxygen levels while asleep will help your doctor make a diagnosis of sleep apnea. A sleep study completed in a sleep center will provide your doctor with more information than can be collected while using a portable sleep apnea test at home.      SECONDARY DISCHARGE DIAGNOSES  Diagnosis: SOB (shortness of breath) on exertion  Assessment and Plan of Treatment: Shortness of breath  Shortness of breath (dyspnea) means you have trouble breathing and could indicate a medical problem. Causes include lung disease, heart disease, low amount of red blood cells (anemia), poor physical fitness, being overweight, smoking, etc. Your health care provider today may not be able to find a cause for your shortness of breath after your exam. In this case, it is important to have a follow-up exam with your primary care physician as instructed. If medicines were prescribed, take them as directed for the full length of time directed. Refrain from tobacco products.  SEEK IMMEDIATE MEDICAL CARE IF YOU HAVE ANY OF THE FOLLOWING SYMPTOMS: worsening shortness of breath, chest pain, back pain, abdominal pain, fever, coughing up blood, lightheadedness/dizziness.  Please continue on the medications sent to the pharmacy, you were started on lasix 20 mg by mouth for your swelling.  Please follow up at the [facility], contact information provided

## 2020-07-29 NOTE — DISCHARGE NOTE PROVIDER - NSDCMRMEDTOKEN_GEN_ALL_CORE_FT
amLODIPine 5 mg oral tablet: 1 tab(s) orally once a day  Eliquis Starter Pack for Treatment of DVT and PE 5 mg oral tablet: 1 tab(s) orally 2 times a day, start on 7/15/2020  Flomax 0.4 mg oral capsule: 1 cap(s) orally once a day   methocarbamol 750 mg oral tablet: 1 tab(s) orally every 6 hours, stop after 10 days  Multiple Vitamins oral tablet: 1 tab(s) orally once a day  oxycodone-acetaminophen 5 mg-325 mg oral tablet: 1 tab(s) orally every 6 hours, As needed, Severe Pain (7 - 10)  polyethylene glycol 3350 oral powder for reconstitution: 17 gram(s) orally once a day, As needed, Constipation  Protonix 40 mg oral delayed release tablet: 1 tab(s) orally once a day (before a meal)  senna oral tablet: 2 tab(s) orally once a day (at bedtime)  traMADol 50 mg oral tablet: 0.5 tab(s) orally every 8 hours, As needed, Moderate Pain (4 - 6) amLODIPine 5 mg oral tablet: 1 tab(s) orally once a day  apixaban 5 mg oral tablet: 1 tab(s) orally every 12 hours  aspirin 81 mg oral tablet, chewable: 1 tab(s) orally once a day  atorvastatin 40 mg oral tablet: 1 tab(s) orally once a day (at bedtime)  Flomax 0.4 mg oral capsule: 1 cap(s) orally once a day   Lasix 20 mg oral tablet: 1 tab(s) orally once a day for leg swelling  methocarbamol 750 mg oral tablet: 1 tab(s) orally every 6 hours, stop after 10 days  Multiple Vitamins oral tablet: 1 tab(s) orally once a day  polyethylene glycol 3350 oral powder for reconstitution: 17 gram(s) orally once a day, As needed, Constipation  Protonix 40 mg oral delayed release tablet: 1 tab(s) orally once a day (before a meal)  senna oral tablet: 2 tab(s) orally once a day (at bedtime)

## 2020-07-29 NOTE — SWALLOW BEDSIDE ASSESSMENT ADULT - ASR SWALLOW LINGUAL MOBILITY
thin coating of white saliva observed on lingual surface, minimal lingual scalloping thin coating of white saliva observed on lingual surface,

## 2020-07-29 NOTE — SWALLOW BEDSIDE ASSESSMENT ADULT - COMMENTS
Pt with noted mandibular dysfunction (jaw sliding during lingual lateralization task). Suspected poor lingual to palate contact evidenced by lingual mucosa.  + lingual hypertrophy. Pt planned for o/p sleep study. Pt may benefit in long term from oromyofunctional therapy with Skilled ST as o/p. tolerated, no globus reported

## 2020-07-29 NOTE — DISCHARGE NOTE NURSING/CASE MANAGEMENT/SOCIAL WORK - PATIENT PORTAL LINK FT
You can access the FollowMyHealth Patient Portal offered by [facility] by registering at the following website: [email or web address]. By joining [facility]’s FollowMyHealth portal, you will also be able to view your health information using other applications (apps) compatible with our system.

## 2020-07-29 NOTE — DISCHARGE NOTE PROVIDER - NSDCFUSCHEDAPPT_GEN_ALL_CORE_FT
RAMIRO BETTENCOURT ; 08/05/2020 ; [facility] S[facility] [address] RAMIRO Desai ; 08/17/2020 ; NPP NeuroSurg [address] RAMIRO BETTENCOURT ; 08/05/2020 ; [facility] [address] [address] RAMIRO Desai ; 08/17/2020 ; NPP NeuroSurg [address] RAMIRO BETTENCOURT ; 08/05/2020 ; N[facility] S[facility] [address] RAMIRO Desai ; 08/17/2020 ; NPP NeuroSurg [address] RAMIRO BETTENCOURT ; 08/05/2020 ; N[facility] S[facility] [address] RAMIRO Desai ; 08/17/2020 ; NPP NeuroSurg [address] RAMIRO BETTENCOURT ; 08/05/2020 ; N[facility] S[facility] [address] RAMIRO Desai ; 08/17/2020 ; NPP NeuroSurg [address] RAMIRO BETTENCOURT ; 08/05/2020 ; NPP Internal Med [address] RAMIRO Bey ; 08/05/2020 ; N[facility] S[facility] [address] RAMIRO Desai ; 08/17/2020 ; NPP NeuroSurg [address] RAMIRO BETTENCOURT ; 08/05/2020 ; NPP Internal Med [address] RAMIRO Bey ; 08/05/2020 ; [facility] [address] RAMIRO Desai ; 08/17/2020 ; NPP NeuroSurg [address]

## 2020-07-29 NOTE — DISCHARGE NOTE NURSING/CASE MANAGEMENT/SOCIAL WORK - NSDCFUADDAPPT_GEN_ALL_CORE_FT
Please schedule an appointment with a Speech Language Pathologist for Myofunctional Therapy to help with your swallow mechanics  - Katlyn Trujillo [phone]

## 2020-07-29 NOTE — SWALLOW BEDSIDE ASSESSMENT ADULT - NS SPL SWALLOW CLINIC TRIAL FT
min overts, Pt reports that this does not happen consistently, no aspiration suspected.  Minimally audible swallow noted with consecutive sips, Pt reports "it feels like it gets stuck then goes down"  Pt pointed to level of thyroid cartilage

## 2020-07-29 NOTE — DISCHARGE NOTE PROVIDER - NSDCFUADDAPPT_GEN_ALL_CORE_FT
Please schedule an appointment with a Speech Language Pathologist for Myofunctional Therapy to help with your swallow mechanics  - Katlyn Trujillo [phone]

## 2020-07-29 NOTE — DISCHARGE NOTE PROVIDER - CARE PROVIDER_API CALL
Jak Gomez  CRITICAL CARE MEDICINE  [address] [place] [address]  Phone: [phone]  Fax: [phone]  Follow Up Time: 2 weeks    Sayra Jack)  Cardiovascular Disease; Internal Medicine  [address] [place] [address]  Phone: [phone]  Fax: [phone]  Follow Up Time: 2 weeks Jak Gomez  CRITICAL CARE MEDICINE  [address] [place] [address]  Phone: [phone]  Fax: [phone]  Scheduled Appointment: 08/06/2020 09:30 AM    Sayra Jack)  Cardiovascular Disease; Internal Medicine  [address] [place] [address]  Phone: [phone]  Fax: [phone]  Follow Up Time: 2 weeks

## 2020-07-29 NOTE — PROGRESS NOTE ADULT - ASSESSMENT
51 yo M w/ pmhx of L2-3 disc herniation s/p microdiscectomy 6/28, provoked DVT s/p IVC filter on 6/28 on Eliquis (admission from 6/26 to 7/20), follows up with Dr. Wu (Vascular) presents with subjective SOB, orthopnea, and difficulty swallowing liquids.     Shortness of breath / Difficulty swallowing liquids / Chest spasms  - SOB is very subjective, comes in episodes with chest spasms.   - SpO2 96-98% on room air   - Denies choking   - CCTA notable for diagonal plaque, per Cardiology not a cause of SOB  - Duplex negative for DVT  - BNP 37 in a non-obese patient - low likelihood of CHF  - Unlikely PE as patient is on room air, with stable vitals, has IVC filter and is on Eliquis , will hold off on CTA   - Esophagram (7/28) unremarkable  - Per Pulmonology: high likelihood of BRISA (STOP-BANG 5-6). Rec PFTs and sleep study as outpatient  - ENT consulted: no acute intervention  - F/u Speech and Swallow consult    Hx of Provoked DVT  - Minimally mobile state due to wheelchair bound due to Lumbar spinal issues  - Duplex negative this admission and has IVC filter (placed 6/28)  - Continue Eliquis 5 mg bid     Hx of Lumbar Laminectomy (6/28)  - Pain meds PRN    HTN  - C/w Amlodipine    Misc  - Diet -DASH   - Activity - Wheelchair   - DVT -On Eliquis  - GI -PPI  - FULL CODE 49 yo M w/ pmhx of L2-3 disc herniation s/p microdiscectomy 6/28, provoked DVT s/p IVC filter on 6/28 on Eliquis (admission from 6/26 to 7/20), follows up with Dr. Wu (Vascular) presents with subjective SOB, orthopnea, and difficulty swallowing liquids.     Shortness of breath / Difficulty swallowing liquids / Chest spasms  - SOB is very subjective, comes in episodes with chest spasms.   - SpO2 96-98% on room air   - Denies choking   - CCTA notable for diagonal plaque, per Cardiology not a cause of SOB  - Duplex negative for DVT  - BNP 37 in a non-obese patient - low likelihood of CHF  - Unlikely PE as patient is on room air, with stable vitals, has IVC filter and is on Eliquis , will hold off on CTA   - Esophagram (7/28) unremarkable  - Per Pulmonology: high likelihood of BRISA (STOP-BANG 5-6). Recommend PFTs and sleep study as outpatient  - ENT consulted: no acute intervention  - F/u Speech and Swallow consult    Hx of Provoked DVT  - Minimally mobile state due to wheelchair bound due to Lumbar spinal issues  - Duplex negative this admission and has IVC filter (placed 6/28)  - Continue Eliquis 5 mg bid     Hx of Lumbar Laminectomy (6/28)  - Pain meds PRN    HTN  - C/w Amlodipine    Misc  - Diet -DASH   - Activity - Wheelchair   - DVT -On Eliquis  - GI -PPI  - FULL CODE

## 2020-07-29 NOTE — SWALLOW BEDSIDE ASSESSMENT ADULT - SWALLOW EVAL: DIAGNOSIS
min throat clearing noted w/ intermittent sips of thin liquids via cup sip, + toleration of regular textures

## 2020-07-29 NOTE — DISCHARGE NOTE PROVIDER - PROVIDER TOKENS
PROVIDER:[TOKEN:[[id number]],FOLLOWUP:[2 weeks]],PROVIDER:[[id number]],FOLLOWUP:[2 weeks]] PROVIDER:[TOKEN:[[id number]],SCHEDULEDAPPT:[08/06/2020],SCHEDULEDAPPTTIME:[09:30 AM]],PROVIDER:[[id number]],FOLLOWUP:[2 weeks]]

## 2020-08-04 ENCOUNTER — RESULT CHARGE (OUTPATIENT)
Age: 50
End: 2020-08-04

## 2020-08-04 DIAGNOSIS — Z79.01 LONG TERM (CURRENT) USE OF ANTICOAGULANTS: ICD-10-CM

## 2020-08-04 DIAGNOSIS — Z86.718 PERSONAL HISTORY OF OTHER VENOUS THROMBOSIS AND EMBOLISM: ICD-10-CM

## 2020-08-04 DIAGNOSIS — R06.02 SHORTNESS OF BREATH: ICD-10-CM

## 2020-08-04 DIAGNOSIS — R60.0 LOCALIZED EDEMA: ICD-10-CM

## 2020-08-04 DIAGNOSIS — Z87.442 PERSONAL HISTORY OF URINARY CALCULI: ICD-10-CM

## 2020-08-04 DIAGNOSIS — I10 ESSENTIAL (PRIMARY) HYPERTENSION: ICD-10-CM

## 2020-08-04 DIAGNOSIS — K21.9 GASTRO-ESOPHAGEAL REFLUX DISEASE WITHOUT ESOPHAGITIS: ICD-10-CM

## 2020-08-04 DIAGNOSIS — G47.33 OBSTRUCTIVE SLEEP APNEA (ADULT) (PEDIATRIC): ICD-10-CM

## 2020-08-04 DIAGNOSIS — I25.10 ATHEROSCLEROTIC HEART DISEASE OF NATIVE CORONARY ARTERY WITHOUT ANGINA PECTORIS: ICD-10-CM

## 2020-08-04 DIAGNOSIS — Z99.3 DEPENDENCE ON WHEELCHAIR: ICD-10-CM

## 2020-08-05 ENCOUNTER — APPOINTMENT (OUTPATIENT)
Dept: INTERNAL MEDICINE | Facility: CLINIC | Age: 50
End: 2020-08-05

## 2020-08-05 ENCOUNTER — APPOINTMENT (OUTPATIENT)
Dept: VASCULAR SURGERY | Facility: CLINIC | Age: 50
End: 2020-08-05
Payer: COMMERCIAL

## 2020-08-05 VITALS
HEIGHT: 67 IN | WEIGHT: 240 LBS | DIASTOLIC BLOOD PRESSURE: 88 MMHG | SYSTOLIC BLOOD PRESSURE: 150 MMHG | BODY MASS INDEX: 37.67 KG/M2 | TEMPERATURE: 97.8 F | HEART RATE: 96 BPM

## 2020-08-05 DIAGNOSIS — M79.89 OTHER SPECIFIED SOFT TISSUE DISORDERS: ICD-10-CM

## 2020-08-05 DIAGNOSIS — M79.605 PAIN IN LEFT LEG: ICD-10-CM

## 2020-08-05 PROCEDURE — 99213 OFFICE O/P EST LOW 20 MIN: CPT

## 2020-08-05 PROCEDURE — 93971 EXTREMITY STUDY: CPT

## 2020-08-05 NOTE — HISTORY OF PRESENT ILLNESS
[FreeTextEntry1] : 51yo M w/ pmhx of L2-3 disc herniation s/p microdiscectomy 6/28 (primarily\par wheelchair-bound), R peroneal and L PT DVT s/p IVC filter on 6/28 on eliquis\par (admission from 6/26 to 7/20),

## 2020-08-07 ENCOUNTER — APPOINTMENT (OUTPATIENT)
Dept: PULMONOLOGY | Facility: CLINIC | Age: 50
End: 2020-08-07
Payer: COMMERCIAL

## 2020-08-07 ENCOUNTER — OUTPATIENT (OUTPATIENT)
Dept: OUTPATIENT SERVICES | Facility: HOSPITAL | Age: 50
LOS: 1 days | Discharge: HOME | End: 2020-08-07

## 2020-08-07 VITALS
BODY MASS INDEX: 37.67 KG/M2 | HEIGHT: 67 IN | WEIGHT: 240 LBS | DIASTOLIC BLOOD PRESSURE: 79 MMHG | HEART RATE: 61 BPM | OXYGEN SATURATION: 97 % | SYSTOLIC BLOOD PRESSURE: 118 MMHG | TEMPERATURE: 96.3 F

## 2020-08-07 DIAGNOSIS — I82.90 ACUTE EMBOLISM AND THROMBOSIS OF UNSPECIFIED VEIN: Chronic | ICD-10-CM

## 2020-08-07 DIAGNOSIS — R06.83 SNORING: ICD-10-CM

## 2020-08-07 DIAGNOSIS — Z86.72 PERSONAL HISTORY OF THROMBOPHLEBITIS: ICD-10-CM

## 2020-08-07 DIAGNOSIS — M51.26 OTHER INTERVERTEBRAL DISC DISPLACEMENT, LUMBAR REGION: Chronic | ICD-10-CM

## 2020-08-07 DIAGNOSIS — N20.0 CALCULUS OF KIDNEY: Chronic | ICD-10-CM

## 2020-08-07 PROCEDURE — 99203 OFFICE O/P NEW LOW 30 MIN: CPT | Mod: GC

## 2020-08-07 NOTE — PHYSICAL EXAM
[No Acute Distress] : no acute distress [Well Nourished] : well nourished [Normal Appearance] : normal appearance [Well Developed] : well developed [No Neck Mass] : no neck mass [Normal Rate/Rhythm] : normal rate/rhythm [No Murmurs] : no murmurs [Normal S1, S2] : normal s1, s2 [No Resp Distress] : no resp distress [No Abnormalities] : no abnormalities [Clear to Auscultation Bilaterally] : clear to auscultation bilaterally [No Clubbing] : no clubbing [Normal Gait] : normal gait [Benign] : benign [No Edema] : no edema [Normal Color/ Pigmentation] : normal color/ pigmentation [No Focal Deficits] : no focal deficits [Oriented x3] : oriented x3 [Normal Affect] : normal affect

## 2020-08-07 NOTE — HISTORY OF PRESENT ILLNESS
[Former] : former [TextBox_4] : 51 Y/O M w/ pmhx of obesity, HTN, nephrolithiasis, microdiscectomy 6/28 due to MRI showing L2-3 disc herniation, R peroneal and L PT DVT s/p IVC filter on 6/28 on elqiuis (admission at [facility] from 6/26 to July 20th),follows up with Dr. Jazmin garg. He has a history of asthma treated with an inhaler (he can't remember the name) and natural remedies. He says that he gets these SOB episodes where he gets\par spasms in the chest and feels Short of Breath for a minute or two, then it resolves by its own only to return in an hour. They occur almost nightly. Associated with snoring and daytime sleepiness. STOPBANG score is 5. Former smoker. Quit 30 years ago.  [TextBox_11] : 0.5 [TextBox_13] : 5 [YearQuit] : 1990

## 2020-08-07 NOTE — ASSESSMENT
[FreeTextEntry1] : 49 Y/O M w/ pmhx of obesity, HTN, nephrolithiasis, microdiscectomy 6/28 due to MRI showing L2-3 disc herniation, R peroneal and L PT DVT s/p IVC filter on 6/28 on elqiuis (admission at [facility] from 6/26 to July 20th),follows up with Dr. Jazmin garg. He has a history of asthma treated with an inhaler (he can't remember the name) and natural remedies. He says that he gets these SOB episodes where he gets spasms in the chest and feels Short of Breath for a minute or two, then it resolves by its own only to return in an hour. They occur almost nightly. Associated with snoring and daytime sleepiness. STOPBANG score is 5. Former smoker. Quit 30 years ago. \par \par # Suspected BRISA - STOP-BANG is 5\par - patient educated on importance of weight loss \par - referral to sleep study (form filled out)\par \par # Hx of asthma (never been intubated or in ICU).\par - treated in the past with natural remedies and an inhaler he doesn't remember the name of\par - referral for PFTs. Continue symbicort\par \par # Provoked DVT following surgery\par - s/p GFF placement\par - continue eliquis for now\par \par # Obesity (BMI 38)\par - patient counselled on importance of healthy diet, regular exercise, and weight loss. \par \par # Hx of cigarette smoking\par - Quit 30 years ago. Smoked 0.5 PPD for 5 years. \par \par # Uncontrolled, persistent GERD\par - no relief with protonix, pepcid, and carafate\par - unable to lie flat due to burning sensation\par - patient and wife requesting GI referral. Referral placed. \par \par RTC in 6 weeks.

## 2020-08-07 NOTE — REVIEW OF SYSTEMS
[Fatigue] : fatigue [Chest Tightness] : chest tightness [SOB on Exertion] : sob on exertion [Dyspnea] : dyspnea [Chest Discomfort] : chest discomfort [Orthopnea] : orthopnea [Negative] : Psychiatric [Cough] : no cough [Sputum] : no sputum [Edema] : no edema [Palpitations] : no palpitations [Leg Cramps] : no leg cramps [TextBox_3] : daytime sleepiness

## 2020-08-07 NOTE — REASON FOR VISIT
[Sleep Apnea] : sleep apnea [Initial] : an initial visit [Asthma] : asthma [Spouse] : spouse [TextBox_13] : Dr Rubi

## 2020-08-10 ENCOUNTER — RESULT CHARGE (OUTPATIENT)
Age: 50
End: 2020-08-10

## 2020-08-11 ENCOUNTER — EMERGENCY (EMERGENCY)
Facility: HOSPITAL | Age: 50
LOS: 0 days | Discharge: HOME | End: 2020-08-11
Attending: EMERGENCY MEDICINE | Admitting: EMERGENCY MEDICINE
Payer: SUBSIDIZED

## 2020-08-11 ENCOUNTER — APPOINTMENT (OUTPATIENT)
Dept: INTERNAL MEDICINE | Facility: CLINIC | Age: 50
End: 2020-08-11
Payer: COMMERCIAL

## 2020-08-11 ENCOUNTER — OUTPATIENT (OUTPATIENT)
Dept: OUTPATIENT SERVICES | Facility: HOSPITAL | Age: 50
LOS: 1 days | Discharge: HOME | End: 2020-08-11

## 2020-08-11 VITALS
OXYGEN SATURATION: 97 % | DIASTOLIC BLOOD PRESSURE: 73 MMHG | RESPIRATION RATE: 20 BRPM | TEMPERATURE: 98 F | HEART RATE: 73 BPM | SYSTOLIC BLOOD PRESSURE: 130 MMHG

## 2020-08-11 VITALS
DIASTOLIC BLOOD PRESSURE: 77 MMHG | SYSTOLIC BLOOD PRESSURE: 115 MMHG | WEIGHT: 240 LBS | HEART RATE: 73 BPM | BODY MASS INDEX: 37.67 KG/M2 | HEIGHT: 67 IN | TEMPERATURE: 97.7 F

## 2020-08-11 DIAGNOSIS — R06.02 SHORTNESS OF BREATH: ICD-10-CM

## 2020-08-11 DIAGNOSIS — N20.0 CALCULUS OF KIDNEY: Chronic | ICD-10-CM

## 2020-08-11 DIAGNOSIS — Z87.442 PERSONAL HISTORY OF URINARY CALCULI: ICD-10-CM

## 2020-08-11 DIAGNOSIS — Z79.82 LONG TERM (CURRENT) USE OF ASPIRIN: ICD-10-CM

## 2020-08-11 DIAGNOSIS — I82.90 ACUTE EMBOLISM AND THROMBOSIS OF UNSPECIFIED VEIN: Chronic | ICD-10-CM

## 2020-08-11 DIAGNOSIS — M51.26 OTHER INTERVERTEBRAL DISC DISPLACEMENT, LUMBAR REGION: Chronic | ICD-10-CM

## 2020-08-11 DIAGNOSIS — R10.811 RIGHT UPPER QUADRANT ABDOMINAL TENDERNESS: ICD-10-CM

## 2020-08-11 DIAGNOSIS — Z98.890 OTHER SPECIFIED POSTPROCEDURAL STATES: ICD-10-CM

## 2020-08-11 DIAGNOSIS — Z86.718 PERSONAL HISTORY OF OTHER VENOUS THROMBOSIS AND EMBOLISM: ICD-10-CM

## 2020-08-11 DIAGNOSIS — I10 ESSENTIAL (PRIMARY) HYPERTENSION: ICD-10-CM

## 2020-08-11 DIAGNOSIS — Z87.891 PERSONAL HISTORY OF NICOTINE DEPENDENCE: ICD-10-CM

## 2020-08-11 DIAGNOSIS — Z79.899 OTHER LONG TERM (CURRENT) DRUG THERAPY: ICD-10-CM

## 2020-08-11 LAB
ALBUMIN SERPL ELPH-MCNC: 4.8 G/DL — SIGNIFICANT CHANGE UP (ref 3.5–5.2)
ALP SERPL-CCNC: 82 U/L — SIGNIFICANT CHANGE UP (ref 30–115)
ALT FLD-CCNC: 31 U/L — SIGNIFICANT CHANGE UP (ref 0–41)
ANION GAP SERPL CALC-SCNC: 16 MMOL/L — HIGH (ref 7–14)
APPEARANCE UR: CLEAR — SIGNIFICANT CHANGE UP
AST SERPL-CCNC: 32 U/L — SIGNIFICANT CHANGE UP (ref 0–41)
BACTERIA # UR AUTO: NEGATIVE — SIGNIFICANT CHANGE UP
BASOPHILS # BLD AUTO: 0.03 K/UL — SIGNIFICANT CHANGE UP (ref 0–0.2)
BASOPHILS NFR BLD AUTO: 0.3 % — SIGNIFICANT CHANGE UP (ref 0–1)
BILIRUB DIRECT SERPL-MCNC: <0.2 MG/DL — SIGNIFICANT CHANGE UP (ref 0–0.2)
BILIRUB INDIRECT FLD-MCNC: >0.3 MG/DL — SIGNIFICANT CHANGE UP (ref 0.2–1.2)
BILIRUB SERPL-MCNC: 0.5 MG/DL — SIGNIFICANT CHANGE UP (ref 0.2–1.2)
BILIRUB UR-MCNC: NEGATIVE — SIGNIFICANT CHANGE UP
BUN SERPL-MCNC: 8 MG/DL — LOW (ref 10–20)
CALCIUM SERPL-MCNC: 9.8 MG/DL — SIGNIFICANT CHANGE UP (ref 8.5–10.1)
CHLORIDE SERPL-SCNC: 100 MMOL/L — SIGNIFICANT CHANGE UP (ref 98–110)
CO2 SERPL-SCNC: 23 MMOL/L — SIGNIFICANT CHANGE UP (ref 17–32)
COLOR SPEC: YELLOW — SIGNIFICANT CHANGE UP
CREAT SERPL-MCNC: 0.8 MG/DL — SIGNIFICANT CHANGE UP (ref 0.7–1.5)
DIFF PNL FLD: NEGATIVE — SIGNIFICANT CHANGE UP
EOSINOPHIL # BLD AUTO: 0.17 K/UL — SIGNIFICANT CHANGE UP (ref 0–0.7)
EOSINOPHIL NFR BLD AUTO: 1.8 % — SIGNIFICANT CHANGE UP (ref 0–8)
EPI CELLS # UR: 1 /HPF — SIGNIFICANT CHANGE UP (ref 0–5)
GLUCOSE SERPL-MCNC: 98 MG/DL — SIGNIFICANT CHANGE UP (ref 70–99)
GLUCOSE UR QL: NEGATIVE — SIGNIFICANT CHANGE UP
HCT VFR BLD CALC: 44 % — SIGNIFICANT CHANGE UP (ref 42–52)
HGB BLD-MCNC: 14.7 G/DL — SIGNIFICANT CHANGE UP (ref 14–18)
HYALINE CASTS # UR AUTO: 2 /LPF — SIGNIFICANT CHANGE UP (ref 0–7)
IMM GRANULOCYTES NFR BLD AUTO: 0.5 % — HIGH (ref 0.1–0.3)
KETONES UR-MCNC: NEGATIVE — SIGNIFICANT CHANGE UP
LEUKOCYTE ESTERASE UR-ACNC: NEGATIVE — SIGNIFICANT CHANGE UP
LIDOCAIN IGE QN: 22 U/L — SIGNIFICANT CHANGE UP (ref 7–60)
LYMPHOCYTES # BLD AUTO: 2.95 K/UL — SIGNIFICANT CHANGE UP (ref 1.2–3.4)
LYMPHOCYTES # BLD AUTO: 30.5 % — SIGNIFICANT CHANGE UP (ref 20.5–51.1)
MAGNESIUM SERPL-MCNC: 2.1 MG/DL — SIGNIFICANT CHANGE UP (ref 1.8–2.4)
MCHC RBC-ENTMCNC: 28.9 PG — SIGNIFICANT CHANGE UP (ref 27–31)
MCHC RBC-ENTMCNC: 33.4 G/DL — SIGNIFICANT CHANGE UP (ref 32–37)
MCV RBC AUTO: 86.4 FL — SIGNIFICANT CHANGE UP (ref 80–94)
MONOCYTES # BLD AUTO: 0.61 K/UL — HIGH (ref 0.1–0.6)
MONOCYTES NFR BLD AUTO: 6.3 % — SIGNIFICANT CHANGE UP (ref 1.7–9.3)
NEUTROPHILS # BLD AUTO: 5.86 K/UL — SIGNIFICANT CHANGE UP (ref 1.4–6.5)
NEUTROPHILS NFR BLD AUTO: 60.6 % — SIGNIFICANT CHANGE UP (ref 42.2–75.2)
NITRITE UR-MCNC: NEGATIVE — SIGNIFICANT CHANGE UP
NRBC # BLD: 0 /100 WBCS — SIGNIFICANT CHANGE UP (ref 0–0)
NT-PROBNP SERPL-SCNC: 34 PG/ML — SIGNIFICANT CHANGE UP (ref 0–300)
PH UR: 6 — SIGNIFICANT CHANGE UP (ref 5–8)
PLATELET # BLD AUTO: 252 K/UL — SIGNIFICANT CHANGE UP (ref 130–400)
POTASSIUM SERPL-MCNC: 4.1 MMOL/L — SIGNIFICANT CHANGE UP (ref 3.5–5)
POTASSIUM SERPL-SCNC: 4.1 MMOL/L — SIGNIFICANT CHANGE UP (ref 3.5–5)
PROT SERPL-MCNC: 7.4 G/DL — SIGNIFICANT CHANGE UP (ref 6–8)
PROT UR-MCNC: ABNORMAL
RBC # BLD: 5.09 M/UL — SIGNIFICANT CHANGE UP (ref 4.7–6.1)
RBC # FLD: 13.3 % — SIGNIFICANT CHANGE UP (ref 11.5–14.5)
RBC CASTS # UR COMP ASSIST: 1 /HPF — SIGNIFICANT CHANGE UP (ref 0–4)
SODIUM SERPL-SCNC: 139 MMOL/L — SIGNIFICANT CHANGE UP (ref 135–146)
SP GR SPEC: 1.04 — HIGH (ref 1.01–1.02)
TROPONIN T SERPL-MCNC: <0.01 NG/ML — SIGNIFICANT CHANGE UP
UROBILINOGEN FLD QL: SIGNIFICANT CHANGE UP
WBC # BLD: 9.67 K/UL — SIGNIFICANT CHANGE UP (ref 4.8–10.8)
WBC # FLD AUTO: 9.67 K/UL — SIGNIFICANT CHANGE UP (ref 4.8–10.8)
WBC UR QL: 2 /HPF — SIGNIFICANT CHANGE UP (ref 0–5)

## 2020-08-11 PROCEDURE — 71275 CT ANGIOGRAPHY CHEST: CPT | Mod: 26

## 2020-08-11 PROCEDURE — 93010 ELECTROCARDIOGRAM REPORT: CPT | Mod: 76

## 2020-08-11 PROCEDURE — 99285 EMERGENCY DEPT VISIT HI MDM: CPT

## 2020-08-11 PROCEDURE — 71045 X-RAY EXAM CHEST 1 VIEW: CPT | Mod: 26

## 2020-08-11 PROCEDURE — 76705 ECHO EXAM OF ABDOMEN: CPT | Mod: 26

## 2020-08-11 PROCEDURE — 99213 OFFICE O/P EST LOW 20 MIN: CPT | Mod: GC

## 2020-08-11 RX ORDER — FAMOTIDINE 10 MG/ML
20 INJECTION INTRAVENOUS ONCE
Refills: 0 | Status: COMPLETED | OUTPATIENT
Start: 2020-08-11 | End: 2020-08-11

## 2020-08-11 RX ORDER — SODIUM CHLORIDE 9 MG/ML
1000 INJECTION, SOLUTION INTRAVENOUS ONCE
Refills: 0 | Status: COMPLETED | OUTPATIENT
Start: 2020-08-11 | End: 2020-08-11

## 2020-08-11 RX ADMIN — FAMOTIDINE 20 MILLIGRAM(S): 10 INJECTION INTRAVENOUS at 16:57

## 2020-08-11 RX ADMIN — SODIUM CHLORIDE 1000 MILLILITER(S): 9 INJECTION, SOLUTION INTRAVENOUS at 16:57

## 2020-08-11 NOTE — ED PROVIDER NOTE - PROGRESS NOTE DETAILS
ATTENDING NOTE: 49 y/o male with hx of DVT S/P spine surgery 32 months ago. On Eliquis and has IVC filter in place. Now c/o SOB x few days. Gradual in onset. No chest pain. No fever. No cough. No N/V.  O/E: Constitutional: Non-toxic in appearance. No respiratory distress at rest. Eyes: No pallor, no jaundice. Neck: Neck supple, no meningeal signs. Respiratory: Lungs CTA and equal b/l. Cardio: S1 S2 regular, no murmur. ABD: ABD soft, no tenderness. Extremities: 2+ pedal edema b/l, no calf tenderness. Skin: No skin rash. Neuro: No neurologic deficits.   Imp: Ddx includes PE, pneumonia. No sign of pericardial effusion.  A/P: Will check labs, CT. Return precautions discussed with patient. instructed to follow up with Dr. Esparza for further evaluation. -DC

## 2020-08-11 NOTE — ED ADULT NURSE NOTE - NSIMPLEMENTINTERV_GEN_ALL_ED
Implemented All Fall with Harm Risk Interventions:  [place] to call system. Call bell, personal items and telephone within reach. Instruct patient to call for assistance. Room bathroom lighting operational. Non-slip footwear when patient is off stretcher. Physically safe environment: no spills, clutter or unnecessary equipment. Stretcher in lowest position, wheels locked, appropriate side rails in place. Provide visual cue, wrist band, yellow gown, etc. Monitor gait and stability. Monitor for mental status changes and reorient to person, place, and time. Review medications for side effects contributing to fall risk. Reinforce activity limits and safety measures with patient and family. Provide visual clues: red socks.

## 2020-08-11 NOTE — ED PROVIDER NOTE - CLINICAL SUMMARY MEDICAL DECISION MAKING FREE TEXT BOX
Called by radiology. CT with no PE. Unchanged ground glass opacities in the lung. Was tested for Covid on 7/29 and was negative.  Patient hemodynamically stable. No hypoxia. No respiratory distress.   Can D/C. Will f/u with VANCE Hernández at medicine [facility]. Called by radiology. CT with no PE. Unchanged ground glass opacities in the lung. Was tested for Covid multiple times, most recently on 7/29 and was negative.  Patient hemodynamically stable. No hypoxia. No respiratory distress.   Can D/C. Will f/u with VANCE Hernández at medicine clinic and pulmonary Dr. Esparza.

## 2020-08-11 NOTE — ED PROVIDER NOTE - PATIENT PORTAL LINK FT
You can access the FollowMyHealth Patient Portal offered by [facility] by registering at the following website: [email or web address]. By joining [facility]’s FollowMyHealth portal, you will also be able to view your health information using other applications (apps) compatible with our system.

## 2020-08-11 NOTE — PHYSICAL EXAM
[Clear to Auscultation] : lungs were clear to auscultation bilaterally [Normal Rate] : normal rate  [Regular Rhythm] : with a regular rhythm [2+] : left 2+ [Normal] : soft, non-tender, non-distended, no masses palpated, no HSM and normal bowel sounds

## 2020-08-11 NOTE — ED PROVIDER NOTE - OBJECTIVE STATEMENT
Patient is a 49 yo M w/ hx of HTN, wheelchair bound 2/2 spinal surgery w/ DVT s/p IVC filter on eliquis referred to ED to r/o PE. Patient endorses SOB x 1 month; had ccta w/ mild disease without intervention 2 weeks prior. Patient states he has had intermittent episodes of SOB lasting approx 5-10 min occuring at random without associated chest pain or palpitations; states symptoms worse after eating big meal or laying down flat. Patient denies fevers, cough, N/V/D, abdominal pain, melena or BRBPR. Patient has outpatient follow up with Dr. Esparza for sleep study. Patient referred to ED ro r/o PE from outpatient clinic.

## 2020-08-11 NOTE — ED PROVIDER NOTE - NS ED ROS FT
Constitutional: No fevers.   Eyes:  No visual changes, eye pain or discharge.  ENMT:  No sore throat or runny nose.  Cardiac: +sob.   Respiratory: +sob.   GI:  No nausea, vomiting, diarrhea or abdominal pain.  :  No dysuria, frequency or burning.  MS:  No myalgia, muscle weakness, joint pain or back pain.  Neuro:  No headache or weakness.  No LOC.  Skin:  No skin rash.   Endocrine: No history of thyroid disease or diabetes.

## 2020-08-11 NOTE — ED PROVIDER NOTE - PHYSICAL EXAMINATION
CONSTITUTIONAL: Well-developed; well-nourished; in no acute distress.   SKIN: warm, dry.  HEAD: Normocephalic; atraumatic.  EYES: PERRL, EOMI, no conjunctival erythema.  ENT: No nasal discharge; airway clear.  NECK: Supple; non tender.  CARD: S1, S2 normal; no murmurs, gallops, or rubs. Regular rate and rhythm.   RESP: No wheezes, rales or rhonchi. normal work of breathing.   ABD: soft nondistended, tender to palpation in the RUQ tenderness without guarding or rebound.   EXT: Normal ROM.  No clubbing, cyanosis or edema. varicose veins bilaterally.   NEURO: Alert, oriented, grossly unremarkable. using wheelchair.   PSYCH: Cooperative, appropriate.

## 2020-08-11 NOTE — ED PROVIDER NOTE - CARE PROVIDER_API CALL
Dominic Esparza  CRITICAL CARE MEDICINE  [address] [place] [id number]  Phone: [phone]  Fax: [phone]  Follow Up Time: Routine

## 2020-08-11 NOTE — ED ADULT NURSE NOTE - CHIEF COMPLAINT QUOTE
Pt c/o shortness of breath and chest discomfort x2 weeks, sent from medicine clinic to r/o PE, pt has hx of DVT

## 2020-08-12 DIAGNOSIS — I10 ESSENTIAL (PRIMARY) HYPERTENSION: ICD-10-CM

## 2020-08-12 DIAGNOSIS — R06.83 SNORING: ICD-10-CM

## 2020-08-12 DIAGNOSIS — J45.909 UNSPECIFIED ASTHMA, UNCOMPLICATED: ICD-10-CM

## 2020-08-12 DIAGNOSIS — Z86.72 PERSONAL HISTORY OF THROMBOPHLEBITIS: ICD-10-CM

## 2020-08-12 DIAGNOSIS — Z86.718 PERSONAL HISTORY OF OTHER VENOUS THROMBOSIS AND EMBOLISM: ICD-10-CM

## 2020-08-12 DIAGNOSIS — R06.02 SHORTNESS OF BREATH: ICD-10-CM

## 2020-08-12 DIAGNOSIS — E66.01 MORBID (SEVERE) OBESITY DUE TO EXCESS CALORIES: ICD-10-CM

## 2020-08-13 DIAGNOSIS — Z86.718 PERSONAL HISTORY OF OTHER VENOUS THROMBOSIS AND EMBOLISM: ICD-10-CM

## 2020-08-13 DIAGNOSIS — I10 ESSENTIAL (PRIMARY) HYPERTENSION: ICD-10-CM

## 2020-08-13 DIAGNOSIS — J45.909 UNSPECIFIED ASTHMA, UNCOMPLICATED: ICD-10-CM

## 2020-08-13 NOTE — HISTORY OF PRESENT ILLNESS
[FreeTextEntry1] :  follow up. [de-identified] : 49 yo with past medical history of hypertension, asthma, GERD, and bilateral calf DVTs s/p IVC filter to be removed 9/1/2020 presenting today for follow up. 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 He was put on Eliquis before the surgery. After the surgery was found to have R peroneal and L prox tibial DVTs. An IVC filter was placed and Eliquis continued. He has the IVC filter in place which will be removed 9/1/20. For the past 2 weeks he has been experiencing shortness of breath when lying down at night, eating, drinking, and on exertion. The SOB is accompanied by left-sided chest pain. The patient states he has been compliant with his Eliquis. The patient tried taking his asthma medication (budesonide-formoterol) on Sunday but it did not relieve the shortness of breath. Pt went to ED for shortness of breath 2 weeks ago. In the ED he received CCTA which showed calcified plaque in proximal LAD. Pt was admitted, and seen by cardiology. CXR unremarkable. Venous duplex showed no evidence of active DVT at that time. Esophagram was done and was unremarkable. Eliquis was continued and pt was d/c with IVC filter in place.\par The patient has states he has been taking furosemide for swollen calves but it has not been helping his calves. 2 nights ago he states that his calves were less swollen and his BP went down to 100/60. He states he took his normal dose of furosemide that day.\par The patient states he is compliant with pantoprazole and his GERD has resolved.

## 2020-08-13 NOTE — REVIEW OF SYSTEMS
[Chest Pain] : chest pain [Vision Problems] : vision problems [Palpitations] : palpitations [Shortness Of Breath] : shortness of breath [Orthopena] : orthopnea [Dyspnea on Exertion] : dyspnea on exertion [Negative] : ENT [Fever] : no fever [Discharge] : no discharge [Recent Change In Weight] : ~T no recent weight change [Pain] : no pain [Cough] : no cough [Abdominal Pain] : no abdominal pain [Diarrhea] : no diarrhea [Constipation] : no constipation

## 2020-08-13 NOTE — END OF VISIT
[] : A student assisted with documenting this visit. I have reviewed and verified all information documented by the student, and made modifications to such information, when appropriate. [FreeTextEntry3] : SOB and NORTON x 2 weeks, concern for PE, sent to ED

## 2020-08-13 NOTE — ASSESSMENT
[FreeTextEntry1] : 49 yo with past medical history of hypertension, asthma, GERD, and bilateral calf DVTs s/p IVC filter to be removed 9/1/2020 presenting today for follow up, shortness of breath of 2 weeks duration.\par \par #shortness of breath\par -EKG done; showed deep Q waves and possible T wave inversions in lead III\par -pt sent to ER for CTA\par -c/w budesonide-formoterol\par -counseled on importance of staying compliant with asthma medications\par \par #HTN\par -c/w amlodipine\par \par #GERD\par -c/w pantoprazole

## 2020-08-16 ENCOUNTER — OUTPATIENT (OUTPATIENT)
Dept: OUTPATIENT SERVICES | Facility: HOSPITAL | Age: 50
LOS: 1 days | Discharge: HOME | End: 2020-08-16

## 2020-08-16 DIAGNOSIS — N20.0 CALCULUS OF KIDNEY: Chronic | ICD-10-CM

## 2020-08-16 DIAGNOSIS — Z11.59 ENCOUNTER FOR SCREENING FOR OTHER VIRAL DISEASES: ICD-10-CM

## 2020-08-16 DIAGNOSIS — M51.26 OTHER INTERVERTEBRAL DISC DISPLACEMENT, LUMBAR REGION: Chronic | ICD-10-CM

## 2020-08-16 DIAGNOSIS — I82.90 ACUTE EMBOLISM AND THROMBOSIS OF UNSPECIFIED VEIN: Chronic | ICD-10-CM

## 2020-08-17 ENCOUNTER — APPOINTMENT (OUTPATIENT)
Dept: NEUROSURGERY | Facility: CLINIC | Age: 50
End: 2020-08-17
Payer: COMMERCIAL

## 2020-08-17 DIAGNOSIS — Z86.69 PERSONAL HISTORY OF OTHER DISEASES OF THE NERVOUS SYSTEM AND SENSE ORGANS: ICD-10-CM

## 2020-08-17 PROCEDURE — 99024 POSTOP FOLLOW-UP VISIT: CPT

## 2020-08-17 NOTE — HISTORY OF PRESENT ILLNESS
[FreeTextEntry1] : I am seeing Mr. Jacob in follow up s/p microdiscectomy. He is doing PT at home. He has had significant recovery in BLE function. On initial presentaiton with large L2-3 disc extrusion her had limited movement in BLE and was unable to ambulate for several months.  He is antigravity 3/5 prox and distally. His incision is well healed. He has some residual neuropathy.   He has been able to walk short distances with a walker. he has bowel and bladder control/ Very happy with his progress. I will see him back in 2 months

## 2020-08-19 ENCOUNTER — OUTPATIENT (OUTPATIENT)
Dept: OUTPATIENT SERVICES | Facility: HOSPITAL | Age: 50
LOS: 1 days | Discharge: HOME | End: 2020-08-19

## 2020-08-19 ENCOUNTER — OUTPATIENT (OUTPATIENT)
Dept: OUTPATIENT SERVICES | Facility: HOSPITAL | Age: 50
LOS: 1 days | Discharge: HOME | End: 2020-08-19
Payer: SUBSIDIZED

## 2020-08-19 VITALS
HEIGHT: 68 IN | RESPIRATION RATE: 18 BRPM | SYSTOLIC BLOOD PRESSURE: 120 MMHG | DIASTOLIC BLOOD PRESSURE: 70 MMHG | TEMPERATURE: 97 F | OXYGEN SATURATION: 98 % | HEART RATE: 72 BPM | WEIGHT: 240.08 LBS

## 2020-08-19 DIAGNOSIS — N20.0 CALCULUS OF KIDNEY: Chronic | ICD-10-CM

## 2020-08-19 DIAGNOSIS — M79.601 PAIN IN RIGHT ARM: ICD-10-CM

## 2020-08-19 DIAGNOSIS — I82.90 ACUTE EMBOLISM AND THROMBOSIS OF UNSPECIFIED VEIN: Chronic | ICD-10-CM

## 2020-08-19 DIAGNOSIS — M51.26 OTHER INTERVERTEBRAL DISC DISPLACEMENT, LUMBAR REGION: Chronic | ICD-10-CM

## 2020-08-19 DIAGNOSIS — Z01.818 ENCOUNTER FOR OTHER PREPROCEDURAL EXAMINATION: ICD-10-CM

## 2020-08-19 PROCEDURE — 93010 ELECTROCARDIOGRAM REPORT: CPT

## 2020-08-19 RX ORDER — BUDESONIDE AND FORMOTEROL FUMARATE DIHYDRATE 160; 4.5 UG/1; UG/1
1 AEROSOL RESPIRATORY (INHALATION)
Qty: 0 | Refills: 0 | DISCHARGE

## 2020-08-19 RX ORDER — GABAPENTIN 400 MG/1
0 CAPSULE ORAL
Qty: 0 | Refills: 0 | DISCHARGE

## 2020-08-19 NOTE — H&P PST ADULT - NSICDXPASTMEDICALHX_GEN_ALL_CORE_FT
PAST MEDICAL HISTORY:  HTN (hypertension)     Kidney stones     Lumbar disc herniation     SOB (shortness of breath)

## 2020-08-19 NOTE — H&P PST ADULT - MUSCULOSKELETAL
detailed exam diminished strength/in wheel chair due to decreased leg strength/no strength/decreased ROM

## 2020-08-19 NOTE — H&P PST ADULT - NSANTHOSAYNRD_GEN_A_CORE
pt with yen study tonight/No. YEN screening performed.  STOP BANG Legend: 0-2 = LOW Risk; 3-4 = INTERMEDIATE Risk; 5-8 = HIGH Risk

## 2020-08-19 NOTE — H&P PST ADULT - HISTORY OF PRESENT ILLNESS
pt with hx dvt 3 months ago proir to back surgery, no more dvt now for above proceudre  PATIENT CURRENTLY DENIES CHEST PAIN  SHORTNESS OF BREATH  PALPITATIONS,  DYSURIA, OR UPPER RESPIRATORY INFECTION IN PAST 2 WEEKS  EXERCISE  TOLERANCE  unABLE TO DETERMINE DUE TO LEG/IMMOBILITY ISSUES DUE TO BACK PROBLEMS  denies travel outside the [place] in the past 30 days  pt denies any covid s/s, or tested positive in the past  pt advised self quarantine till day of procedure

## 2020-08-19 NOTE — H&P PST ADULT - NSICDXPASTSURGICALHX_GEN_ALL_CORE_FT
PAST SURGICAL HISTORY:  Herniated lumbar intervertebral disc discectomy    Kidney stone ureteroscopy    Thrombus thrombectomy of left leg / ivc filter insertion

## 2020-08-20 DIAGNOSIS — G47.33 OBSTRUCTIVE SLEEP APNEA (ADULT) (PEDIATRIC): ICD-10-CM

## 2020-08-20 PROBLEM — R06.02 SHORTNESS OF BREATH: Chronic | Status: ACTIVE | Noted: 2020-08-19

## 2020-08-21 ENCOUNTER — APPOINTMENT (OUTPATIENT)
Dept: GASTROENTEROLOGY | Facility: CLINIC | Age: 50
End: 2020-08-21
Payer: COMMERCIAL

## 2020-08-21 ENCOUNTER — OUTPATIENT (OUTPATIENT)
Dept: OUTPATIENT SERVICES | Facility: HOSPITAL | Age: 50
LOS: 1 days | Discharge: HOME | End: 2020-08-21

## 2020-08-21 VITALS
HEART RATE: 73 BPM | BODY MASS INDEX: 37.67 KG/M2 | HEIGHT: 67 IN | WEIGHT: 240 LBS | SYSTOLIC BLOOD PRESSURE: 117 MMHG | TEMPERATURE: 98 F | DIASTOLIC BLOOD PRESSURE: 81 MMHG

## 2020-08-21 DIAGNOSIS — I82.90 ACUTE EMBOLISM AND THROMBOSIS OF UNSPECIFIED VEIN: Chronic | ICD-10-CM

## 2020-08-21 DIAGNOSIS — M51.26 OTHER INTERVERTEBRAL DISC DISPLACEMENT, LUMBAR REGION: Chronic | ICD-10-CM

## 2020-08-21 DIAGNOSIS — N20.0 CALCULUS OF KIDNEY: Chronic | ICD-10-CM

## 2020-08-21 PROCEDURE — 99203 OFFICE O/P NEW LOW 30 MIN: CPT

## 2020-08-21 RX ORDER — PANTOPRAZOLE 40 MG/1
40 TABLET, DELAYED RELEASE ORAL
Qty: 30 | Refills: 1 | Status: DISCONTINUED | COMMUNITY
Start: 2020-08-07 | End: 2020-08-21

## 2020-08-21 RX ORDER — PANTOPRAZOLE 40 MG/1
40 TABLET, DELAYED RELEASE ORAL DAILY
Qty: 30 | Refills: 1 | Status: DISCONTINUED | COMMUNITY
Start: 2020-03-06 | End: 2020-08-21

## 2020-08-21 NOTE — PHYSICAL EXAM
[General Appearance - Alert] : alert [General Appearance - In No Acute Distress] : in no acute distress [Sclera] : the sclera and conjunctiva were normal [Auscultation Breath Sounds / Voice Sounds] : lungs were clear to auscultation bilaterally [Neck Appearance] : the appearance of the neck was normal [Respiration, Rhythm And Depth] : normal respiratory rhythm and effort [Heart Rate And Rhythm] : heart rate was normal and rhythm regular [Bowel Sounds] : normal bowel sounds [Heart Sounds] : normal S1 and S2 [] : no hepato-splenomegaly [Abdomen Tenderness] : non-tender [Abdomen Soft] : soft [No Focal Deficits] : no focal deficits [Abdomen Mass (___ Cm)] : no abdominal mass palpated [Oriented To Time, Place, And Person] : oriented to person, place, and time

## 2020-08-21 NOTE — REVIEW OF SYSTEMS
[Shortness Of Breath] : shortness of breath [Negative] : Endocrine [Abdominal Pain] : no abdominal pain [Vomiting] : no vomiting [Constipation] : no constipation [Diarrhea] : no diarrhea [Heartburn] : no heartburn [Melena] : no melena [FreeTextEntry6] : spasm

## 2020-08-21 NOTE — ASSESSMENT
[FreeTextEntry1] : 50 gatito old male with PMHx of obesity, HTN, nephrolithiasis, L2-3 disc herniation s/p microdiscectomy , R peroneal and L PT DVT s/p IVC filter on 6/28 on currently on eliquis presenting to the ED for evaluation of GERD symp and SOB.\par \par # Reported spastic sensation to the throat and shortness of breath:\par - pt was evaluated by ENT as inpt 7/220 with no abnormalities reported. \par - pt was evaluated with S/S and noted mandibular dysfunction (jaw sliding during lingual lateralization task).\par - speech recommended that Pt may benefit in long term from oromyofunctional therapy with Skilled ST as o/p.\par - s/p esophagram for suspected dysphagia, was unremarkable 7/2020\par - currently takes pantoprazole 40 mg once daily. \par \par plan:\par - increase PPI to BID\par - will refer for follow up with  speech therapy (he hasn’t seen them as outpatient)\par - pt's symptoms most likely unrelated to GI tract, however pt is 50 yr old, obese, smoking history, will consider non urgent EGD if okay by vascular to hold eliquis for 2 days (or if eliquis is going to be discontinued in the near future)\par \par # CRC screening:\par - pt is average risk\par - pt is due for screening, will consider screening colonoscopy if okay by vascular to hold eliquis for 2 days (or if eliquis is going to be discontinued in the near future)\par \par We will send a message to Dr. Lal (vascular surgery) to clarify this

## 2020-08-21 NOTE — HISTORY OF PRESENT ILLNESS
[de-identified] : 50 gatito old male with PMHx of obesity, HTN, nephrolithiasis, L2-3 disc herniation s/p microdiscectomy , R peroneal and L PT DVT s/p IVC filter on 6/28 on currently on eliquis presenting to the ED for evaluation of GERD like symptoms and SOB. pt endorses that he has attacks of spasm like sensation and SOB that is not related to the food intake or timing of the day.\par \par he denies nausea, vomiting, abdominal pain, regurgitation, dysphagia, melena, hematochezia or hematemesis. \par \par he was started on pantoprazole 40 mg once daily that controlled epigastric burning that he had before. but now he endorses the attacks of SOB/spasms that is persistent on pantoprazole. he was evaluated by pulm who referred him to GI for further evaluation. he has already been seen by ENT and speech pathology for this.\par \par pt endorses loss of 30 pounds over the past 3 months but he attributed secondary to surgery and hospitalization.\par \par never had EGD / Colonoscopy.\par no family history of colon or any other GI related cancer.

## 2020-09-01 ENCOUNTER — OUTPATIENT (OUTPATIENT)
Dept: OUTPATIENT SERVICES | Facility: HOSPITAL | Age: 50
LOS: 1 days | Discharge: HOME | End: 2020-09-01

## 2020-09-01 ENCOUNTER — APPOINTMENT (OUTPATIENT)
Dept: VASCULAR SURGERY | Facility: HOSPITAL | Age: 50
End: 2020-09-01

## 2020-09-01 ENCOUNTER — APPOINTMENT (OUTPATIENT)
Dept: CARDIOLOGY | Facility: CLINIC | Age: 50
End: 2020-09-01
Payer: COMMERCIAL

## 2020-09-01 VITALS
HEIGHT: 67 IN | WEIGHT: 245 LBS | HEART RATE: 67 BPM | SYSTOLIC BLOOD PRESSURE: 118 MMHG | BODY MASS INDEX: 38.45 KG/M2 | DIASTOLIC BLOOD PRESSURE: 78 MMHG | TEMPERATURE: 96.7 F

## 2020-09-01 DIAGNOSIS — I83.93 ASYMPTOMATIC VARICOSE VEINS OF BILATERAL LOWER EXTREMITIES: ICD-10-CM

## 2020-09-01 DIAGNOSIS — I87.8 OTHER SPECIFIED DISORDERS OF VEINS: ICD-10-CM

## 2020-09-01 DIAGNOSIS — R06.02 SHORTNESS OF BREATH: ICD-10-CM

## 2020-09-01 DIAGNOSIS — I10 ESSENTIAL (PRIMARY) HYPERTENSION: ICD-10-CM

## 2020-09-01 DIAGNOSIS — N20.0 CALCULUS OF KIDNEY: Chronic | ICD-10-CM

## 2020-09-01 DIAGNOSIS — M51.26 OTHER INTERVERTEBRAL DISC DISPLACEMENT, LUMBAR REGION: Chronic | ICD-10-CM

## 2020-09-01 DIAGNOSIS — I82.90 ACUTE EMBOLISM AND THROMBOSIS OF UNSPECIFIED VEIN: Chronic | ICD-10-CM

## 2020-09-01 PROCEDURE — 99214 OFFICE O/P EST MOD 30 MIN: CPT

## 2020-09-01 NOTE — DISCUSSION/SUMMARY
[FreeTextEntry1] : Will get CT angio to evaluate for possible CAD and PE.  Need to f/u with pulmonary for evaluation of BRISA.

## 2020-09-01 NOTE — ASSESSMENT
[FreeTextEntry1] : 50 gatito old male with PMHx of obesity, HTN, nephrolithiasis, L2-3 disc herniation s/p microdiscectomy currently using wheelchair , R peroneal and L PT DVT s/p IVC filter on 6/28 on currently on Eliquis presenting to the clinic for evaluation of shortness on breath and clearance prior to the IVC filter clearance.\par \par # dyspnea:\par - normal echo 7/2020\par - 8/2020: EKG: NSR, no ischemic changes\par \par plan:\par - will get CCTA for evaluation prior to the planned IVC filter removal procedure\par - will send metoprolol 25 mg twice q 12 hrs ivette be started before the test\par - will premedicate before with prednisone 50 mg and diphenhydramine 25 mg\par - counselled on diet, exercise and wt loss

## 2020-09-01 NOTE — REVIEW OF SYSTEMS
[Shortness Of Breath] : shortness of breath [Fever] : no fever [Blurry Vision] : no blurred vision [Earache] : no earache [Chest Pain] : no chest pain [Palpitations] : no palpitations [Abdominal Pain] : no abdominal pain [Dysphagia] : no dysphagia [Urinary Frequency] : no change in urinary frequency [Joint Pain] : no joint pain [Skin: A Rash] : no rash:

## 2020-09-01 NOTE — HISTORY OF PRESENT ILLNESS
[FreeTextEntry1] : 50 gatito old male with PMHx of obesity, HTN, nephrolithiasis, L2-3 disc herniation s/p microdiscectomy currently using wheelchair , R peroneal and L PT DVT s/p IVC filter on 6/28 on currently on Eliquis presenting to the clinic for evaluation of shortness on breath and clearance prior to the IVC filter clearance. pt reports spastic sensation to the throat and shortness of breath that has been going on for few months. pt was evaluated by ENT as inpt 7/220 with no abnormalities reported. pt was evaluated with S/S and noted mandibular dysfunction (jaw sliding during lingual lateralization task). pt was also evaluated by GI team and was started on PPI BID that has been helping per the pt. \par \par 7/2020 echo:  1. LV Ejection Fraction by Hamilton's Method with a biplane EF of 67 %.\par  2. Normal left ventricular size and wall thicknesses, with normal systolic and diastolic function.\par  3. Structurally normal mitral valve, with normal leaflet excursion.\par  4. LA volume Index is 18.3 ml/m² ml/m2.\par \par 8/2020 EKG: NSR, no major ischemic changes.

## 2020-09-25 ENCOUNTER — APPOINTMENT (OUTPATIENT)
Dept: PULMONOLOGY | Facility: CLINIC | Age: 50
End: 2020-09-25
Payer: COMMERCIAL

## 2020-09-25 ENCOUNTER — OUTPATIENT (OUTPATIENT)
Dept: OUTPATIENT SERVICES | Facility: HOSPITAL | Age: 50
LOS: 1 days | Discharge: HOME | End: 2020-09-25

## 2020-09-25 VITALS
BODY MASS INDEX: 39.24 KG/M2 | HEIGHT: 67 IN | HEART RATE: 70 BPM | OXYGEN SATURATION: 96 % | DIASTOLIC BLOOD PRESSURE: 75 MMHG | TEMPERATURE: 98.4 F | SYSTOLIC BLOOD PRESSURE: 126 MMHG | WEIGHT: 250 LBS

## 2020-09-25 DIAGNOSIS — M51.26 OTHER INTERVERTEBRAL DISC DISPLACEMENT, LUMBAR REGION: Chronic | ICD-10-CM

## 2020-09-25 DIAGNOSIS — N20.0 CALCULUS OF KIDNEY: Chronic | ICD-10-CM

## 2020-09-25 DIAGNOSIS — I82.90 ACUTE EMBOLISM AND THROMBOSIS OF UNSPECIFIED VEIN: Chronic | ICD-10-CM

## 2020-09-25 DIAGNOSIS — E66.01 MORBID (SEVERE) OBESITY DUE TO EXCESS CALORIES: ICD-10-CM

## 2020-09-25 PROCEDURE — 99213 OFFICE O/P EST LOW 20 MIN: CPT | Mod: GC

## 2020-09-25 NOTE — ASSESSMENT
[FreeTextEntry1] : Patient is a 51yo male with PMH of morbid obesity (BMI >35), nephrolithiasis, microdiscectomy 6/28/2020 due to MRI showing L2-3 disc herniation, R peroneal and L PT DVT s/p IVC filter on 6/28/2020 on Eliquis (admission at [facility] 6/26/2020-7/20/2020),follows up with Dr. Wu (vascular surgery) who presents to the pulmonary clinic for evaluation of obstructive sleep apnea. Recent sleep study is consistent with severe obstructive sleep apnea.\par \par #Severe obstructive sleep apnea\par - STOP-BANG 5\par - Sleep study 8/19/2020: 41 events/hr, consistent with severe sleep apnea\par - Counseled patient on lifestyle modifications, diet, regular exercise, and weight loss\par - Will order auto-CPAP (8-16mmHg)\par \par #Provoked DVT follow surgery\par - s/p [place] filter placement\par - Continue Eliquis for now\par - Patient to be scheduled for IVC filter removal by vascular surgery\par \par #Obesity class II (BMI 39)\par - Patient gained 5lbs since last visit 6 months ago\par - He attributes the weight loss to recent spinal surgery in 6/2020\par - Counseled patient on lifestyle modifications, diet, regular exercise, and weight loss\par \par #History of asthma (no history of intubation or ICU admission)\par - Patient denies any asthma symptoms at this time\par - Continue Symbicort inhaler\par - Instructed patient to complete PFTs that were not done since prior visit\par \par #History of cigarette smoking\par - Smoked 0.5 ppd for 5 years (2.5 pack-year history)\par - Quit 30 years ago\par \par Return to clinic in 4 months\par

## 2020-09-25 NOTE — REVIEW OF SYSTEMS
[Fatigue] : fatigue [Recent Wt Gain (___ Lbs)] : ~T recent [unfilled] lb weight gain [SOB on Exertion] : sob on exertion [Edema] : edema [PND] : PND [Obesity] : obesity [Negative] : Psychiatric [Fever] : no fever [Chills] : no chills [Cough] : no cough [Hemoptysis] : no hemoptysis [Chest Tightness] : no chest tightness [Sputum] : no sputum [Dyspnea] : no dyspnea [Pleuritic Pain] : no pleuritic pain [Chest Discomfort] : no chest discomfort [Orthopnea] : no orthopnea [Palpitations] : no palpitations [Syncope] : no syncope

## 2020-09-25 NOTE — HISTORY OF PRESENT ILLNESS
[Former] : former [< 30 pack-years] : < 30 pack-years [Obstructive Sleep Apnea] : obstructive sleep apnea [TextBox_4] : Patient is a 51yo male with PMH of obesity (BMI >35), nephrolithiasis, microdiscectomy 6/28/2020 due to MRI showing L2-3 disc herniation, R peroneal and L PT DVT s/p IVC filter on 6/28/2020 on Eliquis (admission at [facility] from 6/26/2020-7/20/2020),follows up with Dr. Wu (vascular surgery). He has a history of asthma treated with an inhaler (he can't remember the name) and natural remedies. He was previously seen in the pulmonary clinic for evaluation of suspected obstructive sleep apnea (STOP-BANG score: 5). Patient completed outpatient sleep study, which showed 41 hypopneic events per hour, consistent with severe sleep apnea. He has also gained 5lbs since his last visit 6 weeks ago. He attributes the weight gain to lack of mobility after his spinal surgery 3 months ago. He has not completed the PFTs yet due to scheduling issues. He reports no fevers, chills, wheezing, chest pain, or dyspnea on exertion. He admits to shortness of breath when he lies down and intermittent leg swelling. Recent echocardiogram showed EF 67% with no systolic or diastolic dysfunction. [TextBox_11] : 0.5 [TextBox_13] : 5 [YearQuit] : 1990

## 2020-09-25 NOTE — PHYSICAL EXAM
[No Acute Distress] : no acute distress [Normal Oropharynx] : normal oropharynx [Normal Appearance] : normal appearance [No Neck Mass] : no neck mass [Normal Rate/Rhythm] : normal rate/rhythm [Normal S1, S2] : normal s1, s2 [No Murmurs] : no murmurs [No Resp Distress] : no resp distress [Clear to Auscultation Bilaterally] : clear to auscultation bilaterally [No Abnormalities] : no abnormalities [Benign] : benign [Normal Color/ Pigmentation] : normal color/ pigmentation [No Focal Deficits] : no focal deficits [Oriented x3] : oriented x3 [Normal Affect] : normal affect [TextBox_2] : Obese, in wheelchair

## 2020-09-26 DIAGNOSIS — I10 ESSENTIAL (PRIMARY) HYPERTENSION: ICD-10-CM

## 2020-09-26 DIAGNOSIS — J45.909 UNSPECIFIED ASTHMA, UNCOMPLICATED: ICD-10-CM

## 2020-09-26 DIAGNOSIS — Z86.718 PERSONAL HISTORY OF OTHER VENOUS THROMBOSIS AND EMBOLISM: ICD-10-CM

## 2020-09-26 DIAGNOSIS — G47.33 OBSTRUCTIVE SLEEP APNEA (ADULT) (PEDIATRIC): ICD-10-CM

## 2020-09-26 DIAGNOSIS — E66.01 MORBID (SEVERE) OBESITY DUE TO EXCESS CALORIES: ICD-10-CM

## 2020-09-26 DIAGNOSIS — E66.9 OBESITY, UNSPECIFIED: ICD-10-CM

## 2020-10-09 LAB
ANION GAP SERPL CALC-SCNC: 13 MMOL/L
BUN SERPL-MCNC: 13 MG/DL
CALCIUM SERPL-MCNC: 9.4 MG/DL
CHLORIDE SERPL-SCNC: 105 MMOL/L
CO2 SERPL-SCNC: 24 MMOL/L
CREAT SERPL-MCNC: 0.7 MG/DL
GLUCOSE SERPL-MCNC: 104 MG/DL
POTASSIUM SERPL-SCNC: 4.3 MMOL/L
SODIUM SERPL-SCNC: 142 MMOL/L

## 2020-10-19 ENCOUNTER — APPOINTMENT (OUTPATIENT)
Dept: NEUROSURGERY | Facility: CLINIC | Age: 50
End: 2020-10-19

## 2020-10-20 ENCOUNTER — APPOINTMENT (OUTPATIENT)
Dept: CARDIOLOGY | Facility: CLINIC | Age: 50
End: 2020-10-20

## 2020-10-22 ENCOUNTER — OUTPATIENT (OUTPATIENT)
Dept: OUTPATIENT SERVICES | Facility: HOSPITAL | Age: 50
LOS: 1 days | Discharge: HOME | End: 2020-10-22
Payer: SUBSIDIZED

## 2020-10-22 ENCOUNTER — RESULT REVIEW (OUTPATIENT)
Age: 50
End: 2020-10-22

## 2020-10-22 DIAGNOSIS — N20.0 CALCULUS OF KIDNEY: Chronic | ICD-10-CM

## 2020-10-22 DIAGNOSIS — I82.90 ACUTE EMBOLISM AND THROMBOSIS OF UNSPECIFIED VEIN: Chronic | ICD-10-CM

## 2020-10-22 DIAGNOSIS — R06.89 OTHER ABNORMALITIES OF BREATHING: ICD-10-CM

## 2020-10-22 DIAGNOSIS — M51.26 OTHER INTERVERTEBRAL DISC DISPLACEMENT, LUMBAR REGION: Chronic | ICD-10-CM

## 2020-10-22 PROCEDURE — 75574 CT ANGIO HRT W/3D IMAGE: CPT | Mod: 26

## 2020-10-30 ENCOUNTER — APPOINTMENT (OUTPATIENT)
Dept: GASTROENTEROLOGY | Facility: CLINIC | Age: 50
End: 2020-10-30

## 2020-12-23 PROBLEM — Z87.09 HISTORY OF SORE THROAT: Status: RESOLVED | Noted: 2020-03-31 | Resolved: 2020-12-23

## 2021-02-19 ENCOUNTER — APPOINTMENT (OUTPATIENT)
Dept: PULMONOLOGY | Facility: CLINIC | Age: 51
End: 2021-02-19
Payer: COMMERCIAL

## 2021-02-19 ENCOUNTER — OUTPATIENT (OUTPATIENT)
Dept: OUTPATIENT SERVICES | Facility: HOSPITAL | Age: 51
LOS: 1 days | Discharge: HOME | End: 2021-02-19

## 2021-02-19 DIAGNOSIS — M51.26 OTHER INTERVERTEBRAL DISC DISPLACEMENT, LUMBAR REGION: Chronic | ICD-10-CM

## 2021-02-19 DIAGNOSIS — N20.0 CALCULUS OF KIDNEY: Chronic | ICD-10-CM

## 2021-02-19 DIAGNOSIS — I82.90 ACUTE EMBOLISM AND THROMBOSIS OF UNSPECIFIED VEIN: Chronic | ICD-10-CM

## 2021-02-19 PROCEDURE — 99213 OFFICE O/P EST LOW 20 MIN: CPT | Mod: GC

## 2021-02-19 NOTE — ASSESSMENT
[FreeTextEntry1] : \par Patient is a 49yo male with PMH of morbid obesity (BMI >35), nephrolithiasis, microdiscectomy 6/28/2020 due to MRI showing L2-3 disc herniation, R peroneal and L PT DVT s/p IVC filter on 6/28/2020 stopped Eliquis (admission at [facility] 6/26/2020-7/20/2020),follows up with Dr. Wu (vascular surgery) who presents to the pulmonary clinic for evaluation of obstructive sleep apnea. Recent sleep study is consistent with severe obstructive sleep apnea.\par \par #Severe obstructive sleep apnea\par - STOP-BANG 5\par - Sleep study 8/19/2020: 41 events/hr, consistent with severe sleep apnea\par - Counseled patient on lifestyle modifications, diet, regular exercise, and weight loss\par -  auto-CPAP (8-16mmHg) was ordered but patient cannot afford it. \par - will refer to \par \par #Provoked DVT follow surgery\par - s/p [place] filter placement\par - Stopped eliquis per surgery\par - Patient to be scheduled for IVC filter removal by vascular surgery\par \par #Obesity class II (BMI 39)\par - Patient gained 5lbs since last visit 6 months ago\par - He attributes the weight loss to recent spinal surgery in 6/2020\par - Counseled patient on lifestyle modifications, diet, regular exercise, and weight loss\par \par #History of asthma (no history of intubation or ICU admission)\par - Patient denies any asthma symptoms at this time\par - Continue Symbicort inhaler, proair prn, [facility]\par - Instructed patient to complete PFTs that were not done since prior visit\par \par #History of cigarette smoking\par - Smoked 0.5 ppd for 5 years (2.5 pack-year history)\par - Quit 30 years ago\par \par Return to clinic in 6 months\par .

## 2021-02-19 NOTE — HISTORY OF PRESENT ILLNESS
[TextBox_4] : Patient is a 51yo male with PMH of obesity (BMI >35), nephrolithiasis, microdiscectomy 6/28/2020 due to MRI showing L2-3 disc herniation, R peroneal and L PT DVT s/p IVC filter on 6/28/2020 stopped Eliquis (admission at [facility] from 6/26/2020-7/20/2020),follows up with Dr. Wu (vascular surgery). He has a history of asthma treated with an inhaler (he can't remember the name) and natural remedies.He has shortness of breath at rest that improves with walking. He was previously seen in the pulmonary clinic for evaluation of suspected obstructive sleep apnea (STOP-BANG score: 5). Patient completed outpatient sleep study, which showed 41 hypopneic events per hour, consistent with severe sleep apnea. he's not using the cpap machine at night, because he cannot afford it. he's still having nocturnal symptoms: snoring. He has also gained 5lbs since his last visit. He attributes the weight gain to lack of mobility after his spinal surgery 3 months ago. He has not completed the PFTs yet due to scheduling issues. He reports no fevers, chills, wheezing, chest pain, or dyspnea on exertion. He admits to shortness of breath when he lies down and intermittent leg swelling. Recent echocardiogram showed EF 67% with no systolic or diastolic dysfunction. \par Smoking Status: former, < 30 pack-years \par # Packs per day: 0.5 \par # Years: 5 \par Year quit: 1990  [TextBox_11] : 0.5 [TextBox_13] : 5 [YearQuit] : 1990

## 2021-02-19 NOTE — REVIEW OF SYSTEMS
[Recent Wt Gain (___ Lbs)] : ~T recent [unfilled] lb weight gain [A.M. Dry Mouth] : a.m. dry mouth [Negative] : Endocrine [Fever] : no fever [Fatigue] : no fatigue [Chills] : no chills [Poor Appetite] : no poor appetite [Recent Wt Loss (___ Lbs)] : ~T no recent weight loss [Cough] : no cough [Hemoptysis] : no hemoptysis [Chest Tightness] : no chest tightness [Frequent URIs] : no frequent URIs [Sputum] : no sputum [Dyspnea] : no dyspnea [Pleuritic Pain] : no pleuritic pain [Wheezing] : no wheezing [SOB on Exertion] : no sob on exertion [Chest Discomfort] : no chest discomfort [Claudication] : no claudication [Edema] : no edema [Leg Cramps] : no leg cramps [Orthopnea] : no orthopnea [Palpitations] : no palpitations [Phlebitis] : no phlebitis [Syncope] : no syncope [PND] : no PND [Hay Fever] : no hay fever [Watery Eyes] : no watery eyes [Itchy Eyes] : no itchy eyes [Seasonal Allergies] : no seasonal allergies [Nasal Discharge] : no nasal discharge [Hives] : no hives [Angioedema] : no angioedema [Immunocompromised] : not immunocompromised [GERD] : no gerd [Abdominal Pain] : no abdominal pain [Nausea] : no nausea [Vomiting] : no vomiting [Diarrhea] : no diarrhea [Constipation] : no constipation [Dysphagia] : no dysphagia [Bleeding] : no bleeding [Food Intolerance] : no food intolerance [Hepatic Disease] : no hepatic disease

## 2021-02-19 NOTE — REASON FOR VISIT
[Asthma] : asthma [Follow-Up] : a follow-up visit [Sleep Apnea] : sleep apnea [Home] : at home, [unfilled] , at the time of the visit. [Medical Office: ([facility])___] : at the medical office located in  [Verbal consent obtained from patient] : the patient, [unfilled]

## 2021-02-23 DIAGNOSIS — J45.909 UNSPECIFIED ASTHMA, UNCOMPLICATED: ICD-10-CM

## 2021-02-23 DIAGNOSIS — G47.33 OBSTRUCTIVE SLEEP APNEA (ADULT) (PEDIATRIC): ICD-10-CM

## 2021-02-23 DIAGNOSIS — Z86.718 PERSONAL HISTORY OF OTHER VENOUS THROMBOSIS AND EMBOLISM: ICD-10-CM

## 2021-03-03 ENCOUNTER — APPOINTMENT (OUTPATIENT)
Dept: NEUROSURGERY | Facility: CLINIC | Age: 51
End: 2021-03-03
Payer: SUBSIDIZED

## 2021-03-03 DIAGNOSIS — M47.816 SPONDYLOSIS W/OUT MYELOPATHY OR RADICULOPATHY, LUMBAR REGION: ICD-10-CM

## 2021-03-03 DIAGNOSIS — G83.4 CAUDA EQUINA SYNDROME: ICD-10-CM

## 2021-03-03 PROCEDURE — 99072 ADDL SUPL MATRL&STAF TM PHE: CPT

## 2021-03-03 PROCEDURE — 99213 OFFICE O/P EST LOW 20 MIN: CPT

## 2021-03-03 RX ORDER — DICLOFENAC POTASSIUM 50 MG/1
50 TABLET, COATED ORAL TWICE DAILY
Qty: 20 | Refills: 1 | Status: DISCONTINUED | COMMUNITY
Start: 2020-03-31 | End: 2021-03-03

## 2021-03-03 RX ORDER — HALOBETASOL PROPIONATE 0.5 MG/G
0.05 CREAM TOPICAL DAILY
Qty: 1 | Refills: 3 | Status: DISCONTINUED | COMMUNITY
Start: 2017-05-19 | End: 2021-03-03

## 2021-03-03 RX ORDER — GABAPENTIN 300 MG/1
300 CAPSULE ORAL
Qty: 30 | Refills: 0 | Status: DISCONTINUED | COMMUNITY
Start: 2020-07-20 | End: 2021-03-03

## 2021-03-03 RX ORDER — LISINOPRIL 10 MG/1
10 TABLET ORAL
Qty: 30 | Refills: 6 | Status: DISCONTINUED | COMMUNITY
Start: 2019-09-17 | End: 2021-03-03

## 2021-03-03 RX ORDER — METOPROLOL TARTRATE 25 MG/1
25 TABLET, FILM COATED ORAL TWICE DAILY
Qty: 2 | Refills: 0 | Status: DISCONTINUED | COMMUNITY
Start: 2020-09-01 | End: 2021-03-03

## 2021-03-03 RX ORDER — PANTOPRAZOLE 40 MG/1
40 TABLET, DELAYED RELEASE ORAL DAILY
Qty: 60 | Refills: 1 | Status: DISCONTINUED | COMMUNITY
Start: 2020-08-21 | End: 2021-03-03

## 2021-03-03 RX ORDER — DIPHENHYDRAMINE HCL 25 MG/1
25 TABLET ORAL
Qty: 1 | Refills: 0 | Status: DISCONTINUED | COMMUNITY
Start: 2020-09-01 | End: 2021-03-03

## 2021-03-03 RX ORDER — PREDNISONE 50 MG/1
50 TABLET ORAL
Qty: 1 | Refills: 0 | Status: DISCONTINUED | COMMUNITY
Start: 2020-09-01 | End: 2021-03-03

## 2021-03-04 NOTE — ASSESSMENT
[FreeTextEntry1] : Mr. Davis has done well. I have given him home exercise instruction to do. He does not wish to continue with PT due to financial issues. I will see him back as needed.

## 2021-03-04 NOTE — HISTORY OF PRESENT ILLNESS
[FreeTextEntry1] : Mr. Davis has done well s/p L2/3 discectomy for treatment of a large HNP causing cauda equina syndrome. He notes improvement of his preop radiculopathy. He continues to have some intermittent pain and residual leg weakness however there has been marked improvement compared his preop state. Prior to surgery he had bilateral foot drops with saddle anesthesia and urinary / bowel dysfunction. Although there is some residual weakness in the feet, he is now able to walk without assistance and without AFO braces. He has completed a course of PT.

## 2021-04-19 ENCOUNTER — NON-APPOINTMENT (OUTPATIENT)
Age: 51
End: 2021-04-19

## 2021-04-25 ENCOUNTER — EMERGENCY (EMERGENCY)
Facility: HOSPITAL | Age: 51
LOS: 0 days | Discharge: HOME | End: 2021-04-25
Attending: EMERGENCY MEDICINE | Admitting: EMERGENCY MEDICINE
Payer: SELF-PAY

## 2021-04-25 VITALS
TEMPERATURE: 97 F | RESPIRATION RATE: 17 BRPM | OXYGEN SATURATION: 96 % | SYSTOLIC BLOOD PRESSURE: 146 MMHG | HEART RATE: 68 BPM | DIASTOLIC BLOOD PRESSURE: 78 MMHG | HEIGHT: 68 IN

## 2021-04-25 VITALS — WEIGHT: 270.07 LBS | HEIGHT: 68 IN

## 2021-04-25 DIAGNOSIS — R06.02 SHORTNESS OF BREATH: ICD-10-CM

## 2021-04-25 DIAGNOSIS — Z87.442 PERSONAL HISTORY OF URINARY CALCULI: ICD-10-CM

## 2021-04-25 DIAGNOSIS — R06.01 ORTHOPNEA: ICD-10-CM

## 2021-04-25 DIAGNOSIS — Z79.82 LONG TERM (CURRENT) USE OF ASPIRIN: ICD-10-CM

## 2021-04-25 DIAGNOSIS — M51.26 OTHER INTERVERTEBRAL DISC DISPLACEMENT, LUMBAR REGION: Chronic | ICD-10-CM

## 2021-04-25 DIAGNOSIS — I10 ESSENTIAL (PRIMARY) HYPERTENSION: ICD-10-CM

## 2021-04-25 DIAGNOSIS — M51.26 OTHER INTERVERTEBRAL DISC DISPLACEMENT, LUMBAR REGION: ICD-10-CM

## 2021-04-25 DIAGNOSIS — R07.9 CHEST PAIN, UNSPECIFIED: ICD-10-CM

## 2021-04-25 DIAGNOSIS — Z79.899 OTHER LONG TERM (CURRENT) DRUG THERAPY: ICD-10-CM

## 2021-04-25 DIAGNOSIS — I82.90 ACUTE EMBOLISM AND THROMBOSIS OF UNSPECIFIED VEIN: Chronic | ICD-10-CM

## 2021-04-25 DIAGNOSIS — Z87.891 PERSONAL HISTORY OF NICOTINE DEPENDENCE: ICD-10-CM

## 2021-04-25 DIAGNOSIS — N20.0 CALCULUS OF KIDNEY: Chronic | ICD-10-CM

## 2021-04-25 DIAGNOSIS — Z20.822 CONTACT WITH AND (SUSPECTED) EXPOSURE TO COVID-19: ICD-10-CM

## 2021-04-25 LAB
ALBUMIN SERPL ELPH-MCNC: 4.3 G/DL — SIGNIFICANT CHANGE UP (ref 3.5–5.2)
ALP SERPL-CCNC: 82 U/L — SIGNIFICANT CHANGE UP (ref 30–115)
ALT FLD-CCNC: 30 U/L — SIGNIFICANT CHANGE UP (ref 0–41)
ANION GAP SERPL CALC-SCNC: 9 MMOL/L — SIGNIFICANT CHANGE UP (ref 7–14)
APTT BLD: 34.3 SEC — SIGNIFICANT CHANGE UP (ref 27–39.2)
AST SERPL-CCNC: 24 U/L — SIGNIFICANT CHANGE UP (ref 0–41)
BASOPHILS # BLD AUTO: 0.03 K/UL — SIGNIFICANT CHANGE UP (ref 0–0.2)
BASOPHILS NFR BLD AUTO: 0.3 % — SIGNIFICANT CHANGE UP (ref 0–1)
BILIRUB SERPL-MCNC: 0.6 MG/DL — SIGNIFICANT CHANGE UP (ref 0.2–1.2)
BUN SERPL-MCNC: 12 MG/DL — SIGNIFICANT CHANGE UP (ref 10–20)
CALCIUM SERPL-MCNC: 9.4 MG/DL — SIGNIFICANT CHANGE UP (ref 8.5–10.1)
CHLORIDE SERPL-SCNC: 99 MMOL/L — SIGNIFICANT CHANGE UP (ref 98–110)
CO2 SERPL-SCNC: 28 MMOL/L — SIGNIFICANT CHANGE UP (ref 17–32)
CREAT SERPL-MCNC: 0.8 MG/DL — SIGNIFICANT CHANGE UP (ref 0.7–1.5)
EOSINOPHIL # BLD AUTO: 0.17 K/UL — SIGNIFICANT CHANGE UP (ref 0–0.7)
EOSINOPHIL NFR BLD AUTO: 1.9 % — SIGNIFICANT CHANGE UP (ref 0–8)
GLUCOSE SERPL-MCNC: 110 MG/DL — HIGH (ref 70–99)
HCT VFR BLD CALC: 46.5 % — SIGNIFICANT CHANGE UP (ref 42–52)
HGB BLD-MCNC: 15.8 G/DL — SIGNIFICANT CHANGE UP (ref 14–18)
IMM GRANULOCYTES NFR BLD AUTO: 0.4 % — HIGH (ref 0.1–0.3)
INR BLD: 1.12 RATIO — SIGNIFICANT CHANGE UP (ref 0.65–1.3)
LYMPHOCYTES # BLD AUTO: 3.11 K/UL — SIGNIFICANT CHANGE UP (ref 1.2–3.4)
LYMPHOCYTES # BLD AUTO: 34.3 % — SIGNIFICANT CHANGE UP (ref 20.5–51.1)
MAGNESIUM SERPL-MCNC: 1.9 MG/DL — SIGNIFICANT CHANGE UP (ref 1.8–2.4)
MCHC RBC-ENTMCNC: 27.6 PG — SIGNIFICANT CHANGE UP (ref 27–31)
MCHC RBC-ENTMCNC: 34 G/DL — SIGNIFICANT CHANGE UP (ref 32–37)
MCV RBC AUTO: 81.2 FL — SIGNIFICANT CHANGE UP (ref 80–94)
MONOCYTES # BLD AUTO: 0.63 K/UL — HIGH (ref 0.1–0.6)
MONOCYTES NFR BLD AUTO: 6.9 % — SIGNIFICANT CHANGE UP (ref 1.7–9.3)
NEUTROPHILS # BLD AUTO: 5.1 K/UL — SIGNIFICANT CHANGE UP (ref 1.4–6.5)
NEUTROPHILS NFR BLD AUTO: 56.2 % — SIGNIFICANT CHANGE UP (ref 42.2–75.2)
NRBC # BLD: 0 /100 WBCS — SIGNIFICANT CHANGE UP (ref 0–0)
NT-PROBNP SERPL-SCNC: <5 PG/ML — SIGNIFICANT CHANGE UP (ref 0–300)
PLATELET # BLD AUTO: 245 K/UL — SIGNIFICANT CHANGE UP (ref 130–400)
POTASSIUM SERPL-MCNC: 4.5 MMOL/L — SIGNIFICANT CHANGE UP (ref 3.5–5)
POTASSIUM SERPL-SCNC: 4.5 MMOL/L — SIGNIFICANT CHANGE UP (ref 3.5–5)
PROT SERPL-MCNC: 8.1 G/DL — HIGH (ref 6–8)
PROTHROM AB SERPL-ACNC: 12.9 SEC — HIGH (ref 9.95–12.87)
RBC # BLD: 5.73 M/UL — SIGNIFICANT CHANGE UP (ref 4.7–6.1)
RBC # FLD: 13.6 % — SIGNIFICANT CHANGE UP (ref 11.5–14.5)
SARS-COV-2 RNA SPEC QL NAA+PROBE: SIGNIFICANT CHANGE UP
SODIUM SERPL-SCNC: 136 MMOL/L — SIGNIFICANT CHANGE UP (ref 135–146)
TROPONIN T SERPL-MCNC: <0.01 NG/ML — SIGNIFICANT CHANGE UP
WBC # BLD: 9.08 K/UL — SIGNIFICANT CHANGE UP (ref 4.8–10.8)
WBC # FLD AUTO: 9.08 K/UL — SIGNIFICANT CHANGE UP (ref 4.8–10.8)

## 2021-04-25 PROCEDURE — 93010 ELECTROCARDIOGRAM REPORT: CPT

## 2021-04-25 PROCEDURE — 71046 X-RAY EXAM CHEST 2 VIEWS: CPT | Mod: 26

## 2021-04-25 PROCEDURE — 99285 EMERGENCY DEPT VISIT HI MDM: CPT

## 2021-04-25 RX ORDER — ASPIRIN/CALCIUM CARB/MAGNESIUM 324 MG
325 TABLET ORAL ONCE
Refills: 0 | Status: COMPLETED | OUTPATIENT
Start: 2021-04-25 | End: 2021-04-25

## 2021-04-25 RX ADMIN — Medication 325 MILLIGRAM(S): at 09:50

## 2021-04-25 NOTE — ED PROVIDER NOTE - CARE PLAN
Principal Discharge DX:	Chest pain   Principal Discharge DX:	Chest pain  Secondary Diagnosis:	SOB (shortness of breath)

## 2021-04-25 NOTE — ED PROVIDER NOTE - OBJECTIVE STATEMENT
51 y/o male with hx HTN, Back surgery, ex-smoker presents to the ED c/o "I haven't been feeling well for the last 7-8 months. I feel SOB with I lay back and when I walk. Sometimes I have uncomfortable feeling to my left chest. I've been to the ED 2 other times for the same symptoms. I have an appt with Pulmonary next month." no cough/ fever/ chills/ leg pain/ leg swelling

## 2021-04-25 NOTE — ED ADULT NURSE NOTE - NSIMPLEMENTINTERV_GEN_ALL_ED
Implemented All Universal Safety Interventions:  [place] to call system. Call bell, personal items and telephone within reach. Instruct patient to call for assistance. Room bathroom lighting operational. Non-slip footwear when patient is off stretcher. Physically safe environment: no spills, clutter or unnecessary equipment. Stretcher in lowest position, wheels locked, appropriate side rails in place.

## 2021-04-25 NOTE — ED PROVIDER NOTE - ATTENDING CONTRIBUTION TO CARE
50 year old male, pmhx as documented presenting with dyspnea on exertion, orthopnea, and left chest pain x several months, worsening in the past few days. Chest pain is substernal, non-radiating, worse with exertion, relieved with rest, mild severity. Otherwise denies fevers, palpitations, N/V/D, blood in stool, urinary symptoms or leg swelling.    Vital Signs: I have reviewed the initial vital signs.  Constitutional: NAD, well-nourished, appears stated age, no acute distress.  HEENT: Airway patent, moist MM, no erythema/swelling/deformity of oral structures. EOMI, PERRLA.  CV: regular rate, regular rhythm, well-perfused extremities, 2+ b/l DP and radial pulses equal.  Lungs: BCTA, no increased WOB.  ABD: NTND, no guarding or rebound, no pulsatile mass, no hernias.   MSK: Neck supple, nontender, nl ROM, no stepoff. Chest nontender. Back nontender in TLS spine or to b/l bony structures or flanks. Ext nontender, nl rom, no deformity.   INTEG: Skin warm, dry, no rash.  NEURO: A&Ox3, normal strength, nl sensation throughout, normal speech.   PSYCH: Calm, cooperative, normal affect and interaction.    Will obtain EKG, CXR, labs, re-eval.

## 2021-04-25 NOTE — ED ADULT NURSE NOTE - OBJECTIVE STATEMENT
pt comes in with  sob for the past few months. comes and goes. last night worsened when trying to go to bed. was laying flat. pt is able to speak in full sentences. says he is not SOB at this time. also have middle back pain for 2 months.

## 2021-04-25 NOTE — ED PROVIDER NOTE - PROGRESS NOTE DETAILS
Results discussed and admission recommended. Patient does not want to stay at ER and will f/u outpatient. Risks including MI/ death discussed.  The patient wishes to leave against medical advice.  I have discussed the risks, benefits and alternatives (including the possibility of worsening of disease, pain, permanent disability, and/or death) with the patient and his/her family (if available).  The patient voices understanding of these risks, benefits, and alternatives and still wishes to sign out against medical advice.  The patient is awake, alert, oriented  x 3 and has demonstrated capacity to refuse/direct care.  I have advised the patient that they can and should return immediately should they develop any worse/different/additional symptoms, or if they change their mind and want to continue their care.

## 2021-04-25 NOTE — ED PROVIDER NOTE - CARE PROVIDER_API CALL
Yonathan Lawrence (MD)  Cardiovascular Disease; Internal Medicine; Interventional Cardiology  [address] [place] [address]  Phone: [phone]  Fax: [phone]  Follow Up Time:

## 2021-04-25 NOTE — ED PROVIDER NOTE - PSH
Herniated lumbar intervertebral disc  discectomy  Kidney stone  ureteroscopy  Thrombus  thrombectomy of left leg / ivc filter insertion

## 2021-04-25 NOTE — ED PROVIDER NOTE - PATIENT PORTAL LINK FT
You can access the FollowMyHealth Patient Portal offered by [facility] by registering at the following website: [email or web address]. By joining [employer]’s FollowMyHealth portal, you will also be able to view your health information using other applications (apps) compatible with our system.

## 2021-04-26 ENCOUNTER — EMERGENCY (EMERGENCY)
Facility: HOSPITAL | Age: 51
LOS: 0 days | Discharge: HOME | End: 2021-04-26
Attending: STUDENT IN AN ORGANIZED HEALTH CARE EDUCATION/TRAINING PROGRAM | Admitting: STUDENT IN AN ORGANIZED HEALTH CARE EDUCATION/TRAINING PROGRAM
Payer: SUBSIDIZED

## 2021-04-26 VITALS
SYSTOLIC BLOOD PRESSURE: 160 MMHG | TEMPERATURE: 98 F | OXYGEN SATURATION: 98 % | WEIGHT: 270.07 LBS | HEART RATE: 96 BPM | DIASTOLIC BLOOD PRESSURE: 90 MMHG | RESPIRATION RATE: 20 BRPM | HEIGHT: 68 IN

## 2021-04-26 VITALS — HEART RATE: 77 BPM | SYSTOLIC BLOOD PRESSURE: 129 MMHG | DIASTOLIC BLOOD PRESSURE: 86 MMHG

## 2021-04-26 DIAGNOSIS — N20.0 CALCULUS OF KIDNEY: Chronic | ICD-10-CM

## 2021-04-26 DIAGNOSIS — M62.838 OTHER MUSCLE SPASM: ICD-10-CM

## 2021-04-26 DIAGNOSIS — I48.91 UNSPECIFIED ATRIAL FIBRILLATION: ICD-10-CM

## 2021-04-26 DIAGNOSIS — Z95.5 PRESENCE OF CORONARY ANGIOPLASTY IMPLANT AND GRAFT: ICD-10-CM

## 2021-04-26 DIAGNOSIS — G47.33 OBSTRUCTIVE SLEEP APNEA (ADULT) (PEDIATRIC): ICD-10-CM

## 2021-04-26 DIAGNOSIS — M51.26 OTHER INTERVERTEBRAL DISC DISPLACEMENT, LUMBAR REGION: Chronic | ICD-10-CM

## 2021-04-26 DIAGNOSIS — Z88.5 ALLERGY STATUS TO NARCOTIC AGENT: ICD-10-CM

## 2021-04-26 DIAGNOSIS — I10 ESSENTIAL (PRIMARY) HYPERTENSION: ICD-10-CM

## 2021-04-26 DIAGNOSIS — E11.9 TYPE 2 DIABETES MELLITUS WITHOUT COMPLICATIONS: ICD-10-CM

## 2021-04-26 DIAGNOSIS — Z20.3 CONTACT WITH AND (SUSPECTED) EXPOSURE TO RABIES: ICD-10-CM

## 2021-04-26 DIAGNOSIS — Z23 ENCOUNTER FOR IMMUNIZATION: ICD-10-CM

## 2021-04-26 DIAGNOSIS — Z79.01 LONG TERM (CURRENT) USE OF ANTICOAGULANTS: ICD-10-CM

## 2021-04-26 DIAGNOSIS — E78.5 HYPERLIPIDEMIA, UNSPECIFIED: ICD-10-CM

## 2021-04-26 DIAGNOSIS — I82.90 ACUTE EMBOLISM AND THROMBOSIS OF UNSPECIFIED VEIN: Chronic | ICD-10-CM

## 2021-04-26 DIAGNOSIS — Z79.84 LONG TERM (CURRENT) USE OF ORAL HYPOGLYCEMIC DRUGS: ICD-10-CM

## 2021-04-26 LAB
ALBUMIN SERPL ELPH-MCNC: 4.9 G/DL — SIGNIFICANT CHANGE UP (ref 3.5–5.2)
ALP SERPL-CCNC: 86 U/L — SIGNIFICANT CHANGE UP (ref 30–115)
ALT FLD-CCNC: 36 U/L — SIGNIFICANT CHANGE UP (ref 0–41)
ANION GAP SERPL CALC-SCNC: 15 MMOL/L — HIGH (ref 7–14)
APPEARANCE UR: CLEAR — SIGNIFICANT CHANGE UP
APTT BLD: 34.5 SEC — SIGNIFICANT CHANGE UP (ref 27–39.2)
AST SERPL-CCNC: 38 U/L — SIGNIFICANT CHANGE UP (ref 0–41)
BASOPHILS # BLD AUTO: 0.04 K/UL — SIGNIFICANT CHANGE UP (ref 0–0.2)
BASOPHILS NFR BLD AUTO: 0.3 % — SIGNIFICANT CHANGE UP (ref 0–1)
BILIRUB SERPL-MCNC: 0.7 MG/DL — SIGNIFICANT CHANGE UP (ref 0.2–1.2)
BILIRUB UR-MCNC: NEGATIVE — SIGNIFICANT CHANGE UP
BUN SERPL-MCNC: 16 MG/DL — SIGNIFICANT CHANGE UP (ref 10–20)
CALCIUM SERPL-MCNC: 9.8 MG/DL — SIGNIFICANT CHANGE UP (ref 8.5–10.1)
CHLORIDE SERPL-SCNC: 95 MMOL/L — LOW (ref 98–110)
CO2 SERPL-SCNC: 23 MMOL/L — SIGNIFICANT CHANGE UP (ref 17–32)
COLOR SPEC: COLORLESS — SIGNIFICANT CHANGE UP
CREAT SERPL-MCNC: 0.8 MG/DL — SIGNIFICANT CHANGE UP (ref 0.7–1.5)
D DIMER BLD IA.RAPID-MCNC: 271 NG/ML DDU — HIGH (ref 0–230)
DIFF PNL FLD: NEGATIVE — SIGNIFICANT CHANGE UP
EOSINOPHIL # BLD AUTO: 0.09 K/UL — SIGNIFICANT CHANGE UP (ref 0–0.7)
EOSINOPHIL NFR BLD AUTO: 0.7 % — SIGNIFICANT CHANGE UP (ref 0–8)
GLUCOSE SERPL-MCNC: 110 MG/DL — HIGH (ref 70–99)
GLUCOSE UR QL: NEGATIVE — SIGNIFICANT CHANGE UP
HCT VFR BLD CALC: 48.9 % — SIGNIFICANT CHANGE UP (ref 42–52)
HGB BLD-MCNC: 16.6 G/DL — SIGNIFICANT CHANGE UP (ref 14–18)
IMM GRANULOCYTES NFR BLD AUTO: 0.3 % — SIGNIFICANT CHANGE UP (ref 0.1–0.3)
INR BLD: 1.1 RATIO — SIGNIFICANT CHANGE UP (ref 0.65–1.3)
KETONES UR-MCNC: ABNORMAL
LEUKOCYTE ESTERASE UR-ACNC: NEGATIVE — SIGNIFICANT CHANGE UP
LYMPHOCYTES # BLD AUTO: 32.9 % — SIGNIFICANT CHANGE UP (ref 20.5–51.1)
LYMPHOCYTES # BLD AUTO: 4.03 K/UL — HIGH (ref 1.2–3.4)
MAGNESIUM SERPL-MCNC: 2.1 MG/DL — SIGNIFICANT CHANGE UP (ref 1.8–2.4)
MCHC RBC-ENTMCNC: 27.5 PG — SIGNIFICANT CHANGE UP (ref 27–31)
MCHC RBC-ENTMCNC: 33.9 G/DL — SIGNIFICANT CHANGE UP (ref 32–37)
MCV RBC AUTO: 81 FL — SIGNIFICANT CHANGE UP (ref 80–94)
MONOCYTES # BLD AUTO: 0.78 K/UL — HIGH (ref 0.1–0.6)
MONOCYTES NFR BLD AUTO: 6.4 % — SIGNIFICANT CHANGE UP (ref 1.7–9.3)
NEUTROPHILS # BLD AUTO: 7.26 K/UL — HIGH (ref 1.4–6.5)
NEUTROPHILS NFR BLD AUTO: 59.4 % — SIGNIFICANT CHANGE UP (ref 42.2–75.2)
NITRITE UR-MCNC: NEGATIVE — SIGNIFICANT CHANGE UP
NRBC # BLD: 0 /100 WBCS — SIGNIFICANT CHANGE UP (ref 0–0)
NT-PROBNP SERPL-SCNC: 10 PG/ML — SIGNIFICANT CHANGE UP (ref 0–300)
PH UR: 7 — SIGNIFICANT CHANGE UP (ref 5–8)
PLATELET # BLD AUTO: 273 K/UL — SIGNIFICANT CHANGE UP (ref 130–400)
POTASSIUM SERPL-MCNC: 5.4 MMOL/L — HIGH (ref 3.5–5)
POTASSIUM SERPL-SCNC: 5.4 MMOL/L — HIGH (ref 3.5–5)
PROT SERPL-MCNC: 8.6 G/DL — HIGH (ref 6–8)
PROT UR-MCNC: NEGATIVE — SIGNIFICANT CHANGE UP
PROTHROM AB SERPL-ACNC: 12.7 SEC — SIGNIFICANT CHANGE UP (ref 9.95–12.87)
RBC # BLD: 6.04 M/UL — SIGNIFICANT CHANGE UP (ref 4.7–6.1)
RBC # FLD: 13.5 % — SIGNIFICANT CHANGE UP (ref 11.5–14.5)
SARS-COV-2 RNA SPEC QL NAA+PROBE: SIGNIFICANT CHANGE UP
SODIUM SERPL-SCNC: 133 MMOL/L — LOW (ref 135–146)
SP GR SPEC: >1.05 (ref 1.01–1.03)
TROPONIN T SERPL-MCNC: <0.01 NG/ML — SIGNIFICANT CHANGE UP
UROBILINOGEN FLD QL: SIGNIFICANT CHANGE UP
WBC # BLD: 12.24 K/UL — HIGH (ref 4.8–10.8)
WBC # FLD AUTO: 12.24 K/UL — HIGH (ref 4.8–10.8)

## 2021-04-26 PROCEDURE — 93018 CV STRESS TEST I&R ONLY: CPT

## 2021-04-26 PROCEDURE — 71275 CT ANGIOGRAPHY CHEST: CPT | Mod: 26,MA

## 2021-04-26 PROCEDURE — 70450 CT HEAD/BRAIN W/O DYE: CPT | Mod: 26,MA

## 2021-04-26 PROCEDURE — 93016 CV STRESS TEST SUPVJ ONLY: CPT

## 2021-04-26 PROCEDURE — 93970 EXTREMITY STUDY: CPT | Mod: 26

## 2021-04-26 PROCEDURE — 71045 X-RAY EXAM CHEST 1 VIEW: CPT | Mod: 26

## 2021-04-26 PROCEDURE — 99234 HOSP IP/OBS SM DT SF/LOW 45: CPT

## 2021-04-26 PROCEDURE — 78452 HT MUSCLE IMAGE SPECT MULT: CPT | Mod: 26,MA

## 2021-04-26 RX ORDER — MECLIZINE HCL 12.5 MG
25 TABLET ORAL ONCE
Refills: 0 | Status: COMPLETED | OUTPATIENT
Start: 2021-04-26 | End: 2021-04-26

## 2021-04-26 RX ORDER — ASPIRIN/CALCIUM CARB/MAGNESIUM 324 MG
325 TABLET ORAL ONCE
Refills: 0 | Status: COMPLETED | OUTPATIENT
Start: 2021-04-26 | End: 2021-04-26

## 2021-04-26 RX ORDER — REGADENOSON 0.08 MG/ML
0.4 INJECTION, SOLUTION INTRAVENOUS ONCE
Refills: 0 | Status: COMPLETED | OUTPATIENT
Start: 2021-04-26 | End: 2021-04-26

## 2021-04-26 RX ORDER — SODIUM CHLORIDE 9 MG/ML
500 INJECTION, SOLUTION INTRAVENOUS ONCE
Refills: 0 | Status: COMPLETED | OUTPATIENT
Start: 2021-04-26 | End: 2021-04-26

## 2021-04-26 RX ADMIN — REGADENOSON 0.4 MILLIGRAM(S): 0.08 INJECTION, SOLUTION INTRAVENOUS at 14:32

## 2021-04-26 RX ADMIN — SODIUM CHLORIDE 500 MILLILITER(S): 9 INJECTION, SOLUTION INTRAVENOUS at 02:29

## 2021-04-26 RX ADMIN — SODIUM CHLORIDE 500 MILLILITER(S): 9 INJECTION, SOLUTION INTRAVENOUS at 05:59

## 2021-04-26 RX ADMIN — Medication 325 MILLIGRAM(S): at 06:25

## 2021-04-26 RX ADMIN — Medication 25 MILLIGRAM(S): at 02:29

## 2021-04-26 NOTE — ED CDU PROVIDER DISPOSITION NOTE - PATIENT PORTAL LINK FT
You can access the FollowMyHealth Patient Portal offered by [facility] by registering at the following website: [email or web address]. By joining [employer]’s FollowMyHealth portal, you will also be able to view your health information using other applications (apps) compatible with our system.

## 2021-04-26 NOTE — ED CDU PROVIDER INITIAL DAY NOTE - MEDICAL DECISION MAKING DETAILS
0
Pt in obs under CP r/o  CEx2, EKGx2, Stress, Tele  will also add LE duplex to r/o dvt- more likely chronic venous stasis  recommended pt use his bipap  if negative, will provide referrals for outpt f/u

## 2021-04-26 NOTE — ED CDU PROVIDER DISPOSITION NOTE - CARE PROVIDERS DIRECT ADDRESSES
[email or web address]

## 2021-04-26 NOTE — ED CDU PROVIDER INITIAL DAY NOTE - NS ED ROS FT
Review of Systems  Constitutional:  No fever, chills.  Eyes:  No visual changes, eye pain, or discharge.  ENMT:  No hearing changes, pain, or discharge. No nasal congestion, discharge, or bleeding. No throat pain, swelling, or difficulty swallowing.  Cardiac:  No palpitations, syncope, or edema. (+) chest pain  Respiratory:  No cough. No hemoptysis. (+) SOB, orthopnea  GI:  No nausea, vomiting, diarrhea, or abdominal pain.   :  No dysuria, hematuria, frequency, or burning.   MS:  No back pain.  Skin:  No skin rash, pruritis, jaundice, or lesions.  Neuro:  No headache, dizziness, loss of sensation, or focal weakness.  No change in mental status.

## 2021-04-26 NOTE — ED PROVIDER NOTE - ATTENDING CONTRIBUTION TO CARE
Patient with multiple medical problems presents to ED c/o chest pain and sob, denies f/c/n/v/abd pain.   Vitals reviewed.   Lungs: CTA  abd: +BS, NT, ND, soft  A/P: Chest pain/sob,   labs, EKG, imaging, reevaluation.

## 2021-04-26 NOTE — ED ADULT NURSE REASSESSMENT NOTE - NS ED NURSE REASSESS COMMENT FT1
pt seen and assessed. pt is a/o 4. pt states CP has resolved. pt on continuos cardiac monitoring. NSR noted. pt denies an y further discomfort at this time. will continue to monitor.

## 2021-04-26 NOTE — ED CDU PROVIDER DISPOSITION NOTE - CLINICAL COURSE
ED and OBS w/u overall nml. serial exam benign. no cp/sob/FND. CTH, CTA chest negative. NM stress negative, serial trops neg. pt feeling better. will dc w/ outpt f/u and return precautions

## 2021-04-26 NOTE — ED CDU PROVIDER DISPOSITION NOTE - CARE PROVIDER_API CALL
Sayra Jack)  Cardiovascular Disease; Internal Medicine  [address]  [place] [address]  Phone: [phone]  Fax: [phone]  Follow Up Time: 4-6 Days    Michael Hernández)  Internal Medicine  [address] [facility] [place] [address]  Phone: [phone]  Fax: [phone]  Follow Up Time: 4-6 Days

## 2021-04-26 NOTE — ED CDU PROVIDER INITIAL DAY NOTE - PHYSICAL EXAMINATION
VITAL SIGNS: I have reviewed nursing notes and confirm.  CONSTITUTIONAL: Well-developed; well-nourished; in no acute distress.  SKIN: Skin exam is warm and dry, no acute rash.  HEAD: Normocephalic; atraumatic.  EYES: Conjunctiva and sclera clear.  ENT: No nasal discharge; airway clear.   CARD: S1, S2 normal; no murmurs, gallops, or rubs. Regular rate and rhythm.  RESP: No wheezes, rales or rhonchi. Speaking in full sentences.   ABD: Normal bowel sounds; soft; non-distended; non-tender; No rebound or guarding.   EXT: Normal ROM. No clubbing, cyanosis or edema. No calf TTP or swelling.   NEURO: Alert, oriented. Grossly unremarkable. No focal deficits.

## 2021-04-26 NOTE — ED CDU PROVIDER DISPOSITION NOTE - PROVIDER TOKENS
PROVIDER:[TOKEN:[[id number]],FOLLOWUP:[4-6 Days]],PROVIDER:[TOKEN:[[id number]],FOLLOWUP:[4-6 Days]]

## 2021-04-26 NOTE — ED PROVIDER NOTE - OBJECTIVE STATEMENT
49 yo M with PMHx of HTN, GERD, HLD, L2-3 disc herniation s/p microdiscectomy 6/28, R peroneal and L PT DVT s/p IVC filter on 6/28 (Dr. Whitmore) presenting to ED with intermittent SOB/orthopnea x 7-8 months with occasional left sided chest discomfort, not related to exertion. Pt also c/o feeling overall "unwell." Pt has had evaluation for these symptoms prior to this visit and has had unremarkable TTE/CCTA last year. Pt is scheduled to see pulmonary next month. He was seen in the ED earlier today, left AMA to go to a pulmonary office/appt in [place], however the clinic was closed so he returned back to the ED. Pt denies fever, chills, nausea, vomiting, abdominal pain, diarrhea, headache, dizziness, weakness, back pain, LOC, trauma, urinary symptoms, cough, calf pain, recent travel, or recent surgery. Wears compression stockings 2/2 trace edema bilaterally.

## 2021-04-26 NOTE — ED PROVIDER NOTE - NS ED ROS FT
Constitutional:  No fevers or chills.  Eyes:  No visual changes.  ENT:  No sore throat.  Neck:  No neck pain or stiffness.  Cardiac:  +CP.   Resp:  +SOB.  GI:  No nausea, vomiting, diarrhea, or abdominal pain.  :  No dysuria, frequency, or hematuria.  MSK:  No myalgias or joint pain/swelling.  Neuro:  No headache, dizziness, or weakness.  Skin:  No skin rash.

## 2021-04-26 NOTE — ED CDU PROVIDER INITIAL DAY NOTE - OBJECTIVE STATEMENT
49 yo M with PMHx of HTN, GERD, HLD, L2-3 disc herniation s/p microdiscectomy 6/28, R peroneal and L PT DVT s/p IVC filter on 6/28 (Dr. Wu) presents to the ED c/o intermittent SOB/orthopnea x 7-8 months with occasional left sided chest discomfort, not related to exertion. Pt also c/o feeling overall "unwell." Pt has had evaluation for these symptoms prior to this visit and has had unremarkable TTE/CCTA last year. Pt is scheduled to see pulmonary next month. He denies other complaints. Pt denies fever, chills, nausea, vomiting, abdominal pain, diarrhea, headache, dizziness, weakness, back pain, LOC, trauma, urinary symptoms, cough, calf pain/swelling, recent travel, recent surgery.

## 2021-04-26 NOTE — ED CDU PROVIDER INITIAL DAY NOTE - PROGRESS NOTE DETAILS
Pt seen at bedside, NAD. Arrived overnight, received from WYATT Sanchez. Pt presenting for SOB/Orthopnea associated with left sided chest pain. CCTA Pt seen at bedside, NAD. Arrived overnight, received from WYATT Sanchez. Pt presenting for SOB/Orthopnea associated with left sided chest pain. CCTA 10/2020 - CAD RAD 2. Cardiac enzymes negative x 2, COVID negative. Pt scheduled for Pharm Nuc today.

## 2021-04-26 NOTE — ED CDU PROVIDER DISPOSITION NOTE - NSFOLLOWUPINSTRUCTIONS_ED_ALL_ED_FT
Follow up with your PMD in 1 week  Follow up with Cardiology in 1 week  Maintain your appointment with your Pulmonologist  USE you CPAP  Continue all your home medications  Return to the ED if your symptoms worsen/return

## 2021-04-26 NOTE — ED PROVIDER NOTE - PROGRESS NOTE DETAILS
Dienes- CTA negative for PE, trop negative, will place in obs for further cardiac testing. Patient agrees with plan.

## 2021-04-26 NOTE — ED PROVIDER NOTE - PHYSICAL EXAMINATION
PHYSICAL EXAM: I have reviewed current vital signs.  GENERAL: NAD, well-nourished; well-developed.  HEAD:  Normocephalic, atraumatic.  EYES: Conjunctiva and sclera clear.  ENT: MMM.  NECK: Supple, FROM.  CHEST/LUNG: Clear to auscultation bilaterally; no wheezes, rales, or rhonchi.  HEART: Regular rate and rhythm, normal S1 and S2; no murmurs, rubs, or gallops.  ABDOMEN: Soft, nontender, nondistended.  EXTREMITIES:  2+ peripheral pulses; +trace edema BLE.  NEUROLOGY: A&O x 3. Motor 5/5. No focal neurological deficits.   SKIN: Warm and dry.

## 2021-04-27 LAB
CULTURE RESULTS: NO GROWTH — SIGNIFICANT CHANGE UP
SPECIMEN SOURCE: SIGNIFICANT CHANGE UP

## 2021-05-12 ENCOUNTER — APPOINTMENT (OUTPATIENT)
Dept: UROLOGY | Facility: CLINIC | Age: 51
End: 2021-05-12
Payer: COMMERCIAL

## 2021-05-12 VITALS — WEIGHT: 270 LBS | TEMPERATURE: 97.5 F | HEIGHT: 68 IN | BODY MASS INDEX: 40.92 KG/M2

## 2021-05-12 DIAGNOSIS — R39.9 UNSPECIFIED SYMPTOMS AND SIGNS INVOLVING THE GENITOURINARY SYSTEM: ICD-10-CM

## 2021-05-12 PROCEDURE — 99072 ADDL SUPL MATRL&STAF TM PHE: CPT

## 2021-05-12 PROCEDURE — 99204 OFFICE O/P NEW MOD 45 MIN: CPT

## 2021-05-12 NOTE — ASSESSMENT
[FreeTextEntry1] : This is a 50 year male who has had ED for 10 years\par \par Took full dose sildenafil 9 years ago with no improvement\par on occasiona unable to penetrate\par \par history of HTN\par recent high blood glucose \par \par high sex drive\par \par hgb A1c was 6.1 one year ago \par \par history of stones and some frequent/slow urination

## 2021-05-13 ENCOUNTER — APPOINTMENT (OUTPATIENT)
Dept: PULMONOLOGY | Facility: CLINIC | Age: 51
End: 2021-05-13
Payer: COMMERCIAL

## 2021-05-13 VITALS
BODY MASS INDEX: 40.47 KG/M2 | DIASTOLIC BLOOD PRESSURE: 78 MMHG | OXYGEN SATURATION: 98 % | HEIGHT: 68 IN | HEART RATE: 76 BPM | WEIGHT: 267 LBS | SYSTOLIC BLOOD PRESSURE: 128 MMHG | RESPIRATION RATE: 14 BRPM

## 2021-05-13 DIAGNOSIS — G47.33 OBSTRUCTIVE SLEEP APNEA (ADULT) (PEDIATRIC): ICD-10-CM

## 2021-05-13 DIAGNOSIS — R06.02 SHORTNESS OF BREATH: ICD-10-CM

## 2021-05-13 PROCEDURE — 99214 OFFICE O/P EST MOD 30 MIN: CPT

## 2021-05-13 PROCEDURE — 99072 ADDL SUPL MATRL&STAF TM PHE: CPT

## 2021-05-13 NOTE — DISCUSSION/SUMMARY
[FreeTextEntry1] : BRISA DISCUSS COMPLIANCE AND RETITRATION\par ASTHMA AS NEEDED INHALERS/ PFT\par GERD PPI/ ENT EVAL

## 2021-05-13 NOTE — HISTORY OF PRESENT ILLNESS
[TextBox_4] : FOLLOWED BY RODO IN CLINIC\par \par 1- SEVERE BRISA ON APAP TOUGH TIME TRYING TO USE IT\par 2- ASTHMA ON AS NEEDED INHALERS NON SMOKER\par 3- PROVOKED DVT/ MN AFTER BACK SX OFF AC SP GFF\par 4- CO THROAT TIGHNTESS/ CLOSING/ GERD\par 5- PRIOR RECORDS REVIEWED

## 2021-05-15 ENCOUNTER — OUTPATIENT (OUTPATIENT)
Dept: OUTPATIENT SERVICES | Facility: HOSPITAL | Age: 51
LOS: 1 days | Discharge: HOME | End: 2021-05-15
Payer: COMMERCIAL

## 2021-05-15 ENCOUNTER — RESULT REVIEW (OUTPATIENT)
Age: 51
End: 2021-05-15

## 2021-05-15 DIAGNOSIS — Z87.442 PERSONAL HISTORY OF URINARY CALCULI: ICD-10-CM

## 2021-05-15 DIAGNOSIS — I82.90 ACUTE EMBOLISM AND THROMBOSIS OF UNSPECIFIED VEIN: Chronic | ICD-10-CM

## 2021-05-15 DIAGNOSIS — N20.0 CALCULUS OF KIDNEY: Chronic | ICD-10-CM

## 2021-05-15 DIAGNOSIS — R39.9 UNSPECIFIED SYMPTOMS AND SIGNS INVOLVING THE GENITOURINARY SYSTEM: ICD-10-CM

## 2021-05-15 DIAGNOSIS — M51.26 OTHER INTERVERTEBRAL DISC DISPLACEMENT, LUMBAR REGION: Chronic | ICD-10-CM

## 2021-05-15 PROCEDURE — 76770 US EXAM ABDO BACK WALL COMP: CPT | Mod: 26

## 2021-05-18 ENCOUNTER — NON-APPOINTMENT (OUTPATIENT)
Age: 51
End: 2021-05-18

## 2021-05-18 ENCOUNTER — APPOINTMENT (OUTPATIENT)
Dept: INTERNAL MEDICINE | Facility: CLINIC | Age: 51
End: 2021-05-18
Payer: COMMERCIAL

## 2021-05-18 ENCOUNTER — APPOINTMENT (OUTPATIENT)
Dept: PULMONOLOGY | Facility: CLINIC | Age: 51
End: 2021-05-18

## 2021-05-18 ENCOUNTER — OUTPATIENT (OUTPATIENT)
Dept: OUTPATIENT SERVICES | Facility: HOSPITAL | Age: 51
LOS: 1 days | Discharge: HOME | End: 2021-05-18

## 2021-05-18 VITALS
DIASTOLIC BLOOD PRESSURE: 85 MMHG | HEART RATE: 76 BPM | HEIGHT: 68 IN | BODY MASS INDEX: 40.16 KG/M2 | OXYGEN SATURATION: 98 % | SYSTOLIC BLOOD PRESSURE: 149 MMHG | WEIGHT: 265 LBS | TEMPERATURE: 97.7 F

## 2021-05-18 DIAGNOSIS — I82.90 ACUTE EMBOLISM AND THROMBOSIS OF UNSPECIFIED VEIN: Chronic | ICD-10-CM

## 2021-05-18 DIAGNOSIS — M79.674 PAIN IN RIGHT LEG: ICD-10-CM

## 2021-05-18 DIAGNOSIS — M51.26 OTHER INTERVERTEBRAL DISC DISPLACEMENT, LUMBAR REGION: Chronic | ICD-10-CM

## 2021-05-18 DIAGNOSIS — M79.604 PAIN IN RIGHT LEG: ICD-10-CM

## 2021-05-18 DIAGNOSIS — N20.0 CALCULUS OF KIDNEY: Chronic | ICD-10-CM

## 2021-05-18 PROCEDURE — 99211 OFF/OP EST MAY X REQ PHY/QHP: CPT | Mod: GC

## 2021-05-18 NOTE — PLAN
[FreeTextEntry1] : #Severe obstructive sleep apnea\par - on cpap\par - says he feels like hes choking on it so he only uses it 3-4 hours \par - pulm f/u for alt mask?\par \par #Provoked DVT follow surgery\par - s/p Rankin filter placement\par - Stopped eliquis per surgery\par - Patient to be scheduled for IVC filter removal by vascular surgery\par \par #Obesity class II (BMI 39)\par - wt loss encourgaed\par \par #History of asthma (no history of intubation or ICU admission)\par - Continue Symbicort inhaler, reports not taking it\par - doesn’t take xavier routinely either\par -  PFTs not done yet\par \par #History of cigarette smoking\par - Smoked 0.5 ppd for 5 years (2.5 pack-year history)\par - Quit 30 years ago\par \par #htn\par -poorly controlled\par - increased ccb\par \par #gerd\par - on ppi\par - see gi for crc screen\par \par #impotence\par - sees juárez\par \par hcm\par - crc screen referral\par \par

## 2021-05-18 NOTE — REVIEW OF SYSTEMS
[Fever] : no fever [Chest Pain] : no chest pain [Shortness Of Breath] : no shortness of breath [Headache] : no headache [Abdominal Pain] : no abdominal pain [Easy Bleeding] : no easy bleeding

## 2021-05-18 NOTE — HISTORY OF PRESENT ILLNESS
[de-identified] : here for htn f/u\par c/o difficulty maintaing a firm erection\par did not have IVC filter removed\par did not have colonoscopy\par does wish to do both\par also, expressed intrest in penile surgical option

## 2021-05-18 NOTE — PHYSICAL EXAM
[No Acute Distress] : no acute distress [No Respiratory Distress] : no respiratory distress  [Normal Rate] : normal rate  [No Edema] : there was no peripheral edema [Soft] : abdomen soft [No HSM] : no HSM [Normal Anterior Cervical Nodes] : no anterior cervical lymphadenopathy [No CVA Tenderness] : no CVA  tenderness [No Joint Swelling] : no joint swelling [No Rash] : no rash [Coordination Grossly Intact] : coordination grossly intact

## 2021-05-19 DIAGNOSIS — K21.9 GASTRO-ESOPHAGEAL REFLUX DISEASE WITHOUT ESOPHAGITIS: ICD-10-CM

## 2021-05-19 DIAGNOSIS — M79.604 PAIN IN RIGHT LEG: ICD-10-CM

## 2021-05-19 DIAGNOSIS — J45.909 UNSPECIFIED ASTHMA, UNCOMPLICATED: ICD-10-CM

## 2021-05-19 DIAGNOSIS — I10 ESSENTIAL (PRIMARY) HYPERTENSION: ICD-10-CM

## 2021-06-16 ENCOUNTER — NON-APPOINTMENT (OUTPATIENT)
Age: 51
End: 2021-06-16

## 2021-06-24 ENCOUNTER — APPOINTMENT (OUTPATIENT)
Age: 51
End: 2021-06-24

## 2021-07-06 ENCOUNTER — APPOINTMENT (OUTPATIENT)
Dept: NEUROLOGY | Facility: CLINIC | Age: 51
End: 2021-07-06

## 2021-09-10 NOTE — PRE-ANESTHESIA EVALUATION ADULT - NSANTHPEFT_GEN_ALL_CORE
TRANSFER - IN REPORT:    Verbal report received from Flakita Calderon rn(name) on Teodoro Knox  being received from emergency(unit) for routine progression of care      Report consisted of patients Situation, Background, Assessment and   Recommendations(SBAR). Information from the following report(s) Kardex, ED Summary, MAR and Recent Results was reviewed with the receiving nurse. Opportunity for questions and clarification was provided. Assessment will be  completed upon patients arrival to unit and care assumed. cor: rrr s1s2 nl  lungs: clear

## 2021-11-09 NOTE — OCCUPATIONAL THERAPY INITIAL EVALUATION ADULT - DIAGNOSIS, OT EVAL
11/9/2021       RE: Aertha Zavala  [address] [address]     Dear Colleague,    Thank you for referring your patient, Aretha Zavala, to the [facility] [place] at [facility]. Please see a copy of my visit note below.    Dear Dr. [facility], Roxie:    I had the pleasure of meeting Aretah Zavala in consultation today at the [facility] at your request.    CHIEF COMPLAINT: Dizziness    HISTORY OF PRESENT ILLNESS: Patient is a 39-year-old in today for assessment of dizziness.  She has had occasional balance and dizziness problems in the past.  She has had increased problems with the past 6 months for which she comes in for assessment.  She describes episodes where she seems to pull toward the left, notices visually that the room seems to shift.  This can last anywhere from just seconds to even hours.  She is able to function, just has to be careful.  She does notice she often has headaches frequently during the attacks or after.  She denies any dysphagia, hoarseness, facial paresthesias.  She does have bilateral tinnitus but not problematic.  Feels her hearing is equal and symmetrical, does not notice any significant hearing issues.    ALLERGIES:    Allergies   Allergen Reactions     Metoclopramide Palpitations     Reglan Difficulty breathing and Palpitations     Sudafed [Pseudoephedrine] Itching     Skin is crawling feeling       HABITS: Social History    Substance and Sexual Activity      Alcohol use: Yes        Comment: 2 drinks per month     History   Smoking Status     Never Smoker   Smokeless Tobacco     Never Used         PAST MEDICAL HISTORY: Please see today's intake form (for the remainder of the PMH) which I reviewed and signed.  History reviewed. No pertinent past medical history.    FAMILY HISTORY/SOCIAL HISTORY: History reviewed. No pertinent family  history.   Social History     Socioeconomic History     Marital status:      Spouse name: Not on file     Number of children: Not on file     Years of education: Not on file     Highest education level: Not on file   Occupational History     Not on file   Tobacco Use     Smoking status: Never Smoker     Smokeless tobacco: Never Used   Substance and Sexual Activity     Alcohol use: Yes     Comment: 2 drinks per month     Drug use: Not on file     Sexual activity: Not on file   Other Topics Concern     Not on file   Social History Narrative     Not on file     Social Determinants of Health     Financial Resource Strain: Not on file   Food Insecurity: Not on file   Transportation Needs: Not on file   Physical Activity: Not on file   Stress: Not on file   Social Connections: Not on file   Intimate Partner Violence: Not on file   Housing Stability: Not on file       REVIEW OF SYSTEMS: Patient Supplied Answers to Review of Systems   ENT ROS 11/9/2021   Constitutional Problems with sleep   Neurology Dizzy spells, Headache   Ears, Nose, Throat Ringing/noise in ears, Nasal congestion or drainage, Hoarseness            The remainder of the 10 point ROS is negative    PHYSICIAL EXAMINATION:  Constitutional: The patient was well-groomed and in no acute distress.   Skin: Warm and pink.  Psychiatric: The patient's affect was calm, cooperative, and appropriate.   Respiratory: Breathing comfortably without stridor or exertion of accessory muscles.  Eyes: Pupils were equal and reactive. Extraocular movement intact.   Head: Normocephalic and atraumatic. No lesions or scars.  Ears: Patient placed under the microscope for microscopic evaluation and cleaning of cerumen which was obscuring full visualization and complete assessment of both TMs. Under high power magnification, the right ear was examined and cleaned of cerumen using curet, alligator forceps, and suction.  After cleaning, TM is fully visualized and has normal  position with normal middle ear aeration. The left ear was then cleaned and inspected using microscope, instruments and similar techniques. After cleaning of cerumen, the TM has normal position with normal aeration to middle ear.  Nose: Sinuses were nontender. Anterior rhinoscopy revealed midline septum and absence of purulence or polyps.  Oral Cavity: Normal tongue, floor of mouth, buccal mucosa, and palate. No lesions or masses on inspection or palpation. No abnormal lymph tissue in the oropharynx.   Neck: The parotid is soft without masses. Supple with normal laryngeal and tracheal landmarks.   Lymphatic: There is no palpable lymphadenopathy or other masses in the neck.   Neurologic: Alert and oriented x 3. Cranial nerves III-XI within normal limits. Voice quality normal.  Cerebellar Function Tests:  Grossly normal    Audiogram: Audiogram performed shows normal hearing of both ears through all frequencies.  Excellent discrimination at 96% in each ear.  Normal type A tympanograms bilaterally.  Tuning forks are normal.      IMPRESSION AND PLAN:   1. Dizziness: Discussed this with her in detail.  I do not think the dizziness seems to be from an otologic cause based on her description of symptoms, lack of any associated auditory symptoms, and normal hearing as well as vestibular testing both from a peripheral and central standpoint.  Calorics normal.  She has headaches frequently with the dizziness and certainly wonder about migraine associated vertigo and we discussed that in detail.  She has appointment with neurology and she will follow through with that.  2. Bilateral tinnitus: Not problematic, no treatment needed, monitor.  3. Migraine/headaches: Feel this is likely cause for the dizziness.  Follow through with neurology appointment which is scheduled in the near future.    Thank you very much for the opportunity to participate in the care of your patient.    Rick L Nissen MD         [place]

## 2022-01-18 NOTE — ED PROVIDER NOTE - NO PERTINENT FAMILY HISTORY IN FIRST DEGREE RELATIVES OF:
[place]/[place]  Medication Management  ANTICOAGULATION    Referring Provider: Dr Janey Cast INR: 2.5-3.5    TODAY'S INR: 2.9    WARFARIN Dosage: continue 2.5mg TRSat, 5mg all other days    INR (no units)   Date Value   2022 2.9   2021 3.2   10/12/2021 3.4   2021 3.2   2021 2.8   2021 3.1   2021 5.1       Medication changes:  No changes  Notes:    Fingerstick INR drawn per clinic protocol. Patient states no visible blood in urine and no black tarry stool. Denies any missed doses of warfarin. No change in other maintenance medications or in diet. Will recheck INR in 6 weeks. Patient acknowledges working in consult agreement with pharmacist as referred by his/her physician.                   For Pharmacy Admin Tracking Only     Intervention Detail: Adherence Monitorin   Total # of Interventions Recommended: 2   Total # of Interventions Accepted: 2   Time Spent (min): 20    MAMIE Peacock.Ph., 2022,11:46 AM
n/a

## 2022-01-26 NOTE — ED ADULT NURSE NOTE - CHIEF COMPLAINT QUOTE
Referral pended per other tele encounter. Closing encounter.   pt c.o. left sided flank pain x 3 weeks. he reports being diagnosed with kidney stones recently.

## 2022-06-30 ENCOUNTER — APPOINTMENT (OUTPATIENT)
Dept: INTERNAL MEDICINE | Facility: CLINIC | Age: 52
End: 2022-06-30

## 2022-07-08 ENCOUNTER — APPOINTMENT (OUTPATIENT)
Dept: INTERNAL MEDICINE | Facility: CLINIC | Age: 52
End: 2022-07-08

## 2022-07-08 ENCOUNTER — OUTPATIENT (OUTPATIENT)
Dept: OUTPATIENT SERVICES | Facility: HOSPITAL | Age: 52
LOS: 1 days | Discharge: HOME | End: 2022-07-08

## 2022-07-08 VITALS
OXYGEN SATURATION: 98 % | DIASTOLIC BLOOD PRESSURE: 81 MMHG | HEIGHT: 68 IN | HEART RATE: 75 BPM | SYSTOLIC BLOOD PRESSURE: 148 MMHG | TEMPERATURE: 97.7 F | WEIGHT: 256 LBS | BODY MASS INDEX: 38.8 KG/M2

## 2022-07-08 DIAGNOSIS — M51.26 OTHER INTERVERTEBRAL DISC DISPLACEMENT, LUMBAR REGION: Chronic | ICD-10-CM

## 2022-07-08 DIAGNOSIS — I82.90 ACUTE EMBOLISM AND THROMBOSIS OF UNSPECIFIED VEIN: Chronic | ICD-10-CM

## 2022-07-08 DIAGNOSIS — D72.829 ELEVATED WHITE BLOOD CELL COUNT, UNSPECIFIED: ICD-10-CM

## 2022-07-08 DIAGNOSIS — Z87.442 PERSONAL HISTORY OF URINARY CALCULI: ICD-10-CM

## 2022-07-08 DIAGNOSIS — N20.0 CALCULUS OF KIDNEY: Chronic | ICD-10-CM

## 2022-07-08 PROCEDURE — 99214 OFFICE O/P EST MOD 30 MIN: CPT | Mod: GC

## 2022-07-08 RX ORDER — OMEPRAZOLE 40 MG/1
40 CAPSULE, DELAYED RELEASE ORAL
Qty: 30 | Refills: 3 | Status: DISCONTINUED | COMMUNITY
Start: 2021-05-13 | End: 2022-07-08

## 2022-07-08 RX ORDER — NAPROXEN 500 MG/1
500 TABLET ORAL
Qty: 60 | Refills: 0 | Status: DISCONTINUED | COMMUNITY
Start: 2021-05-18 | End: 2022-07-08

## 2022-07-08 RX ORDER — SIMETHICONE 125 MG/1
125 TABLET, CHEWABLE ORAL 3 TIMES DAILY
Qty: 90 | Refills: 3 | Status: DISCONTINUED | COMMUNITY
Start: 2021-05-18 | End: 2022-07-08

## 2022-07-08 NOTE — HISTORY OF PRESENT ILLNESS
[FreeTextEntry1] : Erectile Dysfunction [de-identified] : 52 year old male patient known to have HTN, DVT s/p IVC filter, Asthma, Gerd, BRISA, disc herniation s/p surgery 2020 presented with a long history of erectile dysfunction that worsened in the last 2 years. Patient has no morning erection and difficulty achieving penetration during sexual intercourse. He denied any penile or testicular pain. No dysuria or any other urinary symptos. No hx of STDs.\par His urologist suggested penile implantation but insurance did not cover that.\par Former Smoker\par Occasional Alcohol intake

## 2022-07-08 NOTE — PHYSICAL EXAM
[No Acute Distress] : no acute distress [Well Nourished] : well nourished [Well Developed] : well developed [Well-Appearing] : well-appearing [Normal Sclera/Conjunctiva] : normal sclera/conjunctiva [Normal Outer Ear/Nose] : the outer ears and nose were normal in appearance [No Respiratory Distress] : no respiratory distress  [No Accessory Muscle Use] : no accessory muscle use [Clear to Auscultation] : lungs were clear to auscultation bilaterally [Normal Rate] : normal rate  [Regular Rhythm] : with a regular rhythm [Normal S1, S2] : normal S1 and S2 [Soft] : abdomen soft [Non Tender] : non-tender [No Rash] : no rash [No Focal Deficits] : no focal deficits [de-identified] : Positive Cremasteric reflex

## 2022-07-08 NOTE — ASSESSMENT
[FreeTextEntry1] : 52 year old male patient with multiple comorbidities including HTN, DVT s/p IVC filter, Disc surgery, presented with a chronic history of Erectile Dysfunction. \par \par ED:\par Referred to Urology\par F/U on Testosterone levels\par \par HTN:\par Pt did not take his meds for 1 year. Counseling done to take meds regularly. Low salt diet advised. Exercise advised. \par \par Asthma:\par Controlled\par No exacerbations\par \par Prediabetes:\par HbA1 is 5.9\par F/U on HbA1c\par \par Dyslipidemia:\par Elevated LDL\par F/U on Lipid profile\par \par Leukocytosis:\par Pt is asymptomatic. Unknown etiology. Will repeat CBC.\par \par HCM: \par Will f/up on next visit. \par \par \par

## 2022-07-11 DIAGNOSIS — N52.9 MALE ERECTILE DYSFUNCTION, UNSPECIFIED: ICD-10-CM

## 2022-07-11 DIAGNOSIS — J45.909 UNSPECIFIED ASTHMA, UNCOMPLICATED: ICD-10-CM

## 2022-07-11 DIAGNOSIS — D72.829 ELEVATED WHITE BLOOD CELL COUNT, UNSPECIFIED: ICD-10-CM

## 2022-07-11 DIAGNOSIS — E78.5 HYPERLIPIDEMIA, UNSPECIFIED: ICD-10-CM

## 2022-07-11 DIAGNOSIS — R73.03 PREDIABETES: ICD-10-CM

## 2022-07-11 DIAGNOSIS — I10 ESSENTIAL (PRIMARY) HYPERTENSION: ICD-10-CM

## 2022-07-18 NOTE — DISCHARGE NOTE PROVIDER - NSDCQMERRANDS_GEN_ALL_CORE
Yes
on the discharge service for the patient. I have reviewed and made amendments to the documentation where necessary.

## 2022-07-21 ENCOUNTER — APPOINTMENT (OUTPATIENT)
Dept: UROLOGY | Facility: CLINIC | Age: 52
End: 2022-07-21

## 2022-07-21 ENCOUNTER — OUTPATIENT (OUTPATIENT)
Dept: OUTPATIENT SERVICES | Facility: HOSPITAL | Age: 52
LOS: 1 days | Discharge: HOME | End: 2022-07-21

## 2022-07-21 ENCOUNTER — NON-APPOINTMENT (OUTPATIENT)
Age: 52
End: 2022-07-21

## 2022-07-21 VITALS
WEIGHT: 254 LBS | DIASTOLIC BLOOD PRESSURE: 100 MMHG | SYSTOLIC BLOOD PRESSURE: 146 MMHG | HEIGHT: 68 IN | BODY MASS INDEX: 38.49 KG/M2 | TEMPERATURE: 98 F | OXYGEN SATURATION: 99 % | HEART RATE: 73 BPM

## 2022-07-21 DIAGNOSIS — N20.0 CALCULUS OF KIDNEY: Chronic | ICD-10-CM

## 2022-07-21 DIAGNOSIS — M51.26 OTHER INTERVERTEBRAL DISC DISPLACEMENT, LUMBAR REGION: Chronic | ICD-10-CM

## 2022-07-21 DIAGNOSIS — I82.90 ACUTE EMBOLISM AND THROMBOSIS OF UNSPECIFIED VEIN: Chronic | ICD-10-CM

## 2022-07-21 PROCEDURE — 99213 OFFICE O/P EST LOW 20 MIN: CPT

## 2022-07-21 NOTE — ASSESSMENT
[FreeTextEntry1] : This is 52 year male  who has had ED for 12 years here for followup on ED treatment. Continues to have ED and is interested in pursuing implantation with new insurance. Took full dose sildenafil 9 years ago with no improvement, has also tried the vacuum erection but states no improvement and pain after use. Not interested in trying trimix ejection. On occasional unable to penetrate. Continue to have a high sex drive, express some feelings of depression related to ED and concern for wife enjoyment. \par \par history of HTN\par history of stones and some frequent/slow urination \par \par July 2022\par A1c= 6.5 \par

## 2022-08-01 LAB
25(OH)D3 SERPL-MCNC: 26 NG/ML
ALBUMIN SERPL ELPH-MCNC: 4.6 G/DL
ALP BLD-CCNC: 93 U/L
ALT SERPL-CCNC: 28 U/L
ANION GAP SERPL CALC-SCNC: 12 MMOL/L
AST SERPL-CCNC: 21 U/L
BASOPHILS # BLD AUTO: 0.05 K/UL
BASOPHILS NFR BLD AUTO: 0.5 %
BILIRUB SERPL-MCNC: 0.4 MG/DL
BUN SERPL-MCNC: 13 MG/DL
CALCIUM SERPL-MCNC: 9.1 MG/DL
CHLORIDE SERPL-SCNC: 105 MMOL/L
CHOLEST SERPL-MCNC: 173 MG/DL
CO2 SERPL-SCNC: 24 MMOL/L
CREAT SERPL-MCNC: 0.9 MG/DL
EGFR: 103 ML/MIN/1.73M2
EOSINOPHIL # BLD AUTO: 0.28 K/UL
EOSINOPHIL NFR BLD AUTO: 2.9 %
ESTIMATED AVERAGE GLUCOSE: 140 MG/DL
GLUCOSE SERPL-MCNC: 133 MG/DL
HBA1C MFR BLD HPLC: 6.5 %
HCT VFR BLD CALC: 48.9 %
HDLC SERPL-MCNC: 42 MG/DL
HGB BLD-MCNC: 16.1 G/DL
IMM GRANULOCYTES NFR BLD AUTO: 0.3 %
LDLC SERPL CALC-MCNC: 109 MG/DL
LYMPHOCYTES # BLD AUTO: 3.73 K/UL
LYMPHOCYTES NFR BLD AUTO: 38.7 %
MAN DIFF?: NORMAL
MCHC RBC-ENTMCNC: 27.8 PG
MCHC RBC-ENTMCNC: 32.9 G/DL
MCV RBC AUTO: 84.5 FL
MONOCYTES # BLD AUTO: 0.64 K/UL
MONOCYTES NFR BLD AUTO: 6.6 %
NEUTROPHILS # BLD AUTO: 4.9 K/UL
NEUTROPHILS NFR BLD AUTO: 51 %
NONHDLC SERPL-MCNC: 131 MG/DL
PLATELET # BLD AUTO: 248 K/UL
POTASSIUM SERPL-SCNC: 4.5 MMOL/L
PROT SERPL-MCNC: 7.6 G/DL
RBC # BLD: 5.79 M/UL
RBC # FLD: 14.5 %
SODIUM SERPL-SCNC: 141 MMOL/L
TESTOST FREE SERPL-MCNC: 6.1 PG/ML
TESTOST SERPL-MCNC: 306 NG/DL
TRIGL SERPL-MCNC: 110 MG/DL
TSH SERPL-ACNC: 3 UIU/ML
WBC # FLD AUTO: 9.63 K/UL

## 2022-08-18 LAB
ALBUMIN SERPL ELPH-MCNC: 4.7 G/DL
ALP BLD-CCNC: 98 U/L
ALT SERPL-CCNC: 24 U/L
ANION GAP SERPL CALC-SCNC: 13 MMOL/L
AST SERPL-CCNC: 23 U/L
BASOPHILS # BLD AUTO: 0.04 K/UL
BASOPHILS NFR BLD AUTO: 0.3 %
BILIRUB SERPL-MCNC: 0.3 MG/DL
BUN SERPL-MCNC: 13 MG/DL
CALCIUM SERPL-MCNC: 9.4 MG/DL
CHLORIDE SERPL-SCNC: 104 MMOL/L
CHOLEST SERPL-MCNC: 179 MG/DL
CO2 SERPL-SCNC: 23 MMOL/L
CREAT SERPL-MCNC: 0.9 MG/DL
CREAT SPEC-SCNC: 182 MG/DL
EOSINOPHIL # BLD AUTO: 0.2 K/UL
EOSINOPHIL NFR BLD AUTO: 1.6 %
ESTIMATED AVERAGE GLUCOSE: 123 MG/DL
GLUCOSE SERPL-MCNC: 92 MG/DL
HBA1C MFR BLD HPLC: 5.9 %
HCT VFR BLD CALC: 44.1 %
HCV AB SER QL: NONREACTIVE
HCV S/CO RATIO: 0.12 S/CO
HDLC SERPL-MCNC: 37 MG/DL
HGB BLD-MCNC: 14.5 G/DL
HIV1+2 AB SPEC QL IA.RAPID: NONREACTIVE
IMM GRANULOCYTES NFR BLD AUTO: 0.4 %
LDLC SERPL CALC-MCNC: 114 MG/DL
LYMPHOCYTES # BLD AUTO: 3.97 K/UL
LYMPHOCYTES NFR BLD AUTO: 31.1 %
MAN DIFF?: NORMAL
MCHC RBC-ENTMCNC: 28.2 PG
MCHC RBC-ENTMCNC: 32.9 G/DL
MCV RBC AUTO: 85.8 FL
MICROALBUMIN 24H UR DL<=1MG/L-MCNC: 1.2 MG/DL
MICROALBUMIN/CREAT 24H UR-RTO: 7 MG/G
MONOCYTES # BLD AUTO: 0.87 K/UL
MONOCYTES NFR BLD AUTO: 6.8 %
NEUTROPHILS # BLD AUTO: 7.62 K/UL
NEUTROPHILS NFR BLD AUTO: 59.8 %
NONHDLC SERPL-MCNC: 142 MG/DL
PLATELET # BLD AUTO: 252 K/UL
POTASSIUM SERPL-SCNC: 4.5 MMOL/L
PROT SERPL-MCNC: 7.7 G/DL
RBC # BLD: 5.14 M/UL
RBC # FLD: 15 %
SODIUM SERPL-SCNC: 140 MMOL/L
TRIGL SERPL-MCNC: 152 MG/DL
TSH SERPL-ACNC: 1.67 UIU/ML
WBC # FLD AUTO: 12.75 K/UL

## 2022-09-01 ENCOUNTER — APPOINTMENT (OUTPATIENT)
Dept: UROLOGY | Facility: CLINIC | Age: 52
End: 2022-09-01

## 2022-09-08 ENCOUNTER — APPOINTMENT (OUTPATIENT)
Dept: INTERNAL MEDICINE | Facility: CLINIC | Age: 52
End: 2022-09-08

## 2022-10-13 ENCOUNTER — OUTPATIENT (OUTPATIENT)
Dept: OUTPATIENT SERVICES | Facility: HOSPITAL | Age: 52
LOS: 1 days | Discharge: HOME | End: 2022-10-13

## 2022-10-13 ENCOUNTER — APPOINTMENT (OUTPATIENT)
Dept: INTERNAL MEDICINE | Facility: CLINIC | Age: 52
End: 2022-10-13

## 2022-10-13 VITALS
HEART RATE: 61 BPM | OXYGEN SATURATION: 97 % | DIASTOLIC BLOOD PRESSURE: 86 MMHG | WEIGHT: 258 LBS | BODY MASS INDEX: 39.1 KG/M2 | HEIGHT: 68 IN | SYSTOLIC BLOOD PRESSURE: 137 MMHG | TEMPERATURE: 96.5 F

## 2022-10-13 DIAGNOSIS — R29.898 OTHER SYMPTOMS AND SIGNS INVOLVING THE MUSCULOSKELETAL SYSTEM: ICD-10-CM

## 2022-10-13 DIAGNOSIS — Z86.718 PERSONAL HISTORY OF OTHER VENOUS THROMBOSIS AND EMBOLISM: ICD-10-CM

## 2022-10-13 DIAGNOSIS — M51.26 OTHER INTERVERTEBRAL DISC DISPLACEMENT, LUMBAR REGION: Chronic | ICD-10-CM

## 2022-10-13 DIAGNOSIS — I82.90 ACUTE EMBOLISM AND THROMBOSIS OF UNSPECIFIED VEIN: Chronic | ICD-10-CM

## 2022-10-13 DIAGNOSIS — Z23 ENCOUNTER FOR IMMUNIZATION: ICD-10-CM

## 2022-10-13 DIAGNOSIS — N20.0 CALCULUS OF KIDNEY: Chronic | ICD-10-CM

## 2022-10-13 DIAGNOSIS — E66.9 OBESITY, UNSPECIFIED: ICD-10-CM

## 2022-10-13 PROCEDURE — 99214 OFFICE O/P EST MOD 30 MIN: CPT | Mod: GC

## 2022-10-13 NOTE — PHYSICAL EXAM
[No Acute Distress] : no acute distress [Well Nourished] : well nourished [Well Developed] : well developed [Well-Appearing] : well-appearing [Normal Sclera/Conjunctiva] : normal sclera/conjunctiva [Normal Outer Ear/Nose] : the outer ears and nose were normal in appearance [No Respiratory Distress] : no respiratory distress  [No Accessory Muscle Use] : no accessory muscle use [Clear to Auscultation] : lungs were clear to auscultation bilaterally [Normal Rate] : normal rate  [Regular Rhythm] : with a regular rhythm [Normal S1, S2] : normal S1 and S2 [Soft] : abdomen soft [Non Tender] : non-tender [No Rash] : no rash [No Focal Deficits] : no focal deficits [de-identified] : obese

## 2022-10-13 NOTE — ASSESSMENT
[FreeTextEntry1] : Mr. Davis 52 year old male patient known to have HTN, DVT s/p IVC filter, Asthma, Gerd, BRISA, disc herniation s/p surgery 2020, erectile dysfunction presents to the clinic for a 3 month follow up visit.\par \par \par #Essential HTN:\par - c/w amlodipine 10 mg daily\par - controlled \par \par #Asthma:\par - Controlled\par - No exacerbations\par - c/w duonebs prn\par \par #Prediabetes:\par - HbA1 is 6.5 (06/2022)\par - advised on diet and lifestyle modification \par \par #Dyslipidemia:\par -  (slightly improving)\par - advised on diet and lifestyle modification \par \par #obesity\par - advised on diet and lifestyle modification \par \par \par #erectile dysfunction\par - followed by urology; pending IPP eval insurance \par \par #b/l leg weakness; hx of disc herniation s/p surgery\par - neurology referral given \par \par \par #HCM\par - flu vaccine administered 10/13\par - Colonoscopy referral given \par -RTC in 6 months/prn \par

## 2022-10-13 NOTE — HISTORY OF PRESENT ILLNESS
[FreeTextEntry1] : Follow up visit  [de-identified] : Mr. Davis 52 year old male patient known to have HTN, DVT s/p IVC filter, Asthma, Gerd, BRISA, disc herniation s/p surgery 2020, erectile dysfunction presents to the clinic for a 3 month follow up visit.\par \par Patient reports following up with urology for erectile dysfunction; pending insurance auth for IPP; also reports of b/l leg weakness since surgery which has not improved however feels gait instability when walking fast. No red flags \par \par Patient reports compliant with BP medications.

## 2022-10-14 DIAGNOSIS — E78.5 HYPERLIPIDEMIA, UNSPECIFIED: ICD-10-CM

## 2022-10-14 DIAGNOSIS — E66.9 OBESITY, UNSPECIFIED: ICD-10-CM

## 2022-10-14 DIAGNOSIS — R73.03 PREDIABETES: ICD-10-CM

## 2022-10-14 DIAGNOSIS — J45.909 UNSPECIFIED ASTHMA, UNCOMPLICATED: ICD-10-CM

## 2022-10-14 DIAGNOSIS — R29.898 OTHER SYMPTOMS AND SIGNS INVOLVING THE MUSCULOSKELETAL SYSTEM: ICD-10-CM

## 2022-10-14 DIAGNOSIS — I10 ESSENTIAL (PRIMARY) HYPERTENSION: ICD-10-CM

## 2022-10-14 DIAGNOSIS — N52.9 MALE ERECTILE DYSFUNCTION, UNSPECIFIED: ICD-10-CM

## 2022-10-15 ENCOUNTER — APPOINTMENT (OUTPATIENT)
Dept: INTERNAL MEDICINE | Facility: CLINIC | Age: 52
End: 2022-10-15

## 2022-12-02 NOTE — OCCUPATIONAL THERAPY INITIAL EVALUATION ADULT - LEVEL OF CONSCIOUSNESS, OT EVAL
alert O-Z Plasty Text: The defect edges were debeveled with a #15 scalpel blade.  Given the location of the defect, shape of the defect and the proximity to free margins an O-Z plasty (double transposition flap) was deemed most appropriate.  Using a sterile surgical marker, the appropriate transposition flaps were drawn incorporating the defect and placing the expected incisions within the relaxed skin tension lines where possible.    The area thus outlined was incised deep to adipose tissue with a #15 scalpel blade.  The skin margins were undermined to an appropriate distance in all directions utilizing iris scissors.  Hemostasis was achieved with electrocautery.  The flaps were then transposed into place, one clockwise and the other counterclockwise, and anchored with interrupted buried subcutaneous sutures.

## 2023-04-12 ENCOUNTER — EMERGENCY (EMERGENCY)
Facility: HOSPITAL | Age: 53
LOS: 0 days | Discharge: HOME | End: 2023-04-12
Attending: EMERGENCY MEDICINE
Payer: SELF-PAY

## 2023-04-12 VITALS
HEART RATE: 67 BPM | SYSTOLIC BLOOD PRESSURE: 154 MMHG | DIASTOLIC BLOOD PRESSURE: 78 MMHG | TEMPERATURE: 99 F | OXYGEN SATURATION: 98 % | RESPIRATION RATE: 18 BRPM

## 2023-04-12 VITALS
HEART RATE: 72 BPM | DIASTOLIC BLOOD PRESSURE: 83 MMHG | OXYGEN SATURATION: 97 % | TEMPERATURE: 99 F | SYSTOLIC BLOOD PRESSURE: 144 MMHG

## 2023-04-12 DIAGNOSIS — E78.5 HYPERLIPIDEMIA, UNSPECIFIED: ICD-10-CM

## 2023-04-12 DIAGNOSIS — Z20.822 CONTACT WITH AND (SUSPECTED) EXPOSURE TO COVID-19: ICD-10-CM

## 2023-04-12 DIAGNOSIS — R35.89 OTHER POLYURIA: ICD-10-CM

## 2023-04-12 DIAGNOSIS — N20.0 CALCULUS OF KIDNEY: Chronic | ICD-10-CM

## 2023-04-12 DIAGNOSIS — Z79.84 LONG TERM (CURRENT) USE OF ORAL HYPOGLYCEMIC DRUGS: ICD-10-CM

## 2023-04-12 DIAGNOSIS — I10 ESSENTIAL (PRIMARY) HYPERTENSION: ICD-10-CM

## 2023-04-12 DIAGNOSIS — R42 DIZZINESS AND GIDDINESS: ICD-10-CM

## 2023-04-12 DIAGNOSIS — M51.26 OTHER INTERVERTEBRAL DISC DISPLACEMENT, LUMBAR REGION: Chronic | ICD-10-CM

## 2023-04-12 DIAGNOSIS — R07.9 CHEST PAIN, UNSPECIFIED: ICD-10-CM

## 2023-04-12 DIAGNOSIS — K21.9 GASTRO-ESOPHAGEAL REFLUX DISEASE WITHOUT ESOPHAGITIS: ICD-10-CM

## 2023-04-12 DIAGNOSIS — Z86.718 PERSONAL HISTORY OF OTHER VENOUS THROMBOSIS AND EMBOLISM: ICD-10-CM

## 2023-04-12 DIAGNOSIS — Z79.82 LONG TERM (CURRENT) USE OF ASPIRIN: ICD-10-CM

## 2023-04-12 DIAGNOSIS — I82.90 ACUTE EMBOLISM AND THROMBOSIS OF UNSPECIFIED VEIN: Chronic | ICD-10-CM

## 2023-04-12 LAB
ALBUMIN SERPL ELPH-MCNC: 4.4 G/DL — SIGNIFICANT CHANGE UP (ref 3.5–5.2)
ALP SERPL-CCNC: 91 U/L — SIGNIFICANT CHANGE UP (ref 30–115)
ALT FLD-CCNC: 25 U/L — SIGNIFICANT CHANGE UP (ref 0–41)
ANION GAP SERPL CALC-SCNC: 11 MMOL/L — SIGNIFICANT CHANGE UP (ref 7–14)
APPEARANCE UR: CLEAR — SIGNIFICANT CHANGE UP
AST SERPL-CCNC: 17 U/L — SIGNIFICANT CHANGE UP (ref 0–41)
B-OH-BUTYR SERPL-SCNC: <0.2 MMOL/L — SIGNIFICANT CHANGE UP
BASE EXCESS BLDV CALC-SCNC: 0.9 MMOL/L — SIGNIFICANT CHANGE UP (ref -2–3)
BASOPHILS # BLD AUTO: 0.04 K/UL — SIGNIFICANT CHANGE UP (ref 0–0.2)
BASOPHILS NFR BLD AUTO: 0.4 % — SIGNIFICANT CHANGE UP (ref 0–1)
BILIRUB SERPL-MCNC: 0.4 MG/DL — SIGNIFICANT CHANGE UP (ref 0.2–1.2)
BILIRUB UR-MCNC: NEGATIVE — SIGNIFICANT CHANGE UP
BUN SERPL-MCNC: 17 MG/DL — SIGNIFICANT CHANGE UP (ref 10–20)
CA-I SERPL-SCNC: 1.21 MMOL/L — SIGNIFICANT CHANGE UP (ref 1.15–1.33)
CALCIUM SERPL-MCNC: 9.3 MG/DL — SIGNIFICANT CHANGE UP (ref 8.4–10.4)
CHLORIDE SERPL-SCNC: 103 MMOL/L — SIGNIFICANT CHANGE UP (ref 98–110)
CO2 SERPL-SCNC: 24 MMOL/L — SIGNIFICANT CHANGE UP (ref 17–32)
COLOR SPEC: SIGNIFICANT CHANGE UP
CREAT SERPL-MCNC: 0.8 MG/DL — SIGNIFICANT CHANGE UP (ref 0.7–1.5)
DIFF PNL FLD: NEGATIVE — SIGNIFICANT CHANGE UP
EGFR: 106 ML/MIN/1.73M2 — SIGNIFICANT CHANGE UP
EOSINOPHIL # BLD AUTO: 0.27 K/UL — SIGNIFICANT CHANGE UP (ref 0–0.7)
EOSINOPHIL NFR BLD AUTO: 2.4 % — SIGNIFICANT CHANGE UP (ref 0–8)
FLUAV AG NPH QL: SIGNIFICANT CHANGE UP
FLUBV AG NPH QL: SIGNIFICANT CHANGE UP
GAS PNL BLDV: 135 MMOL/L — LOW (ref 136–145)
GAS PNL BLDV: SIGNIFICANT CHANGE UP
GAS PNL BLDV: SIGNIFICANT CHANGE UP
GLUCOSE SERPL-MCNC: 126 MG/DL — HIGH (ref 70–99)
GLUCOSE UR QL: NEGATIVE — SIGNIFICANT CHANGE UP
HCO3 BLDV-SCNC: 27 MMOL/L — SIGNIFICANT CHANGE UP (ref 22–29)
HCT VFR BLD CALC: 44.3 % — SIGNIFICANT CHANGE UP (ref 42–52)
HCT VFR BLDA CALC: 47 % — SIGNIFICANT CHANGE UP (ref 39–51)
HGB BLD CALC-MCNC: 15.8 G/DL — SIGNIFICANT CHANGE UP (ref 12.6–17.4)
HGB BLD-MCNC: 15.2 G/DL — SIGNIFICANT CHANGE UP (ref 14–18)
IMM GRANULOCYTES NFR BLD AUTO: 0.5 % — HIGH (ref 0.1–0.3)
KETONES UR-MCNC: NEGATIVE — SIGNIFICANT CHANGE UP
LACTATE BLDV-MCNC: 1.2 MMOL/L — SIGNIFICANT CHANGE UP (ref 0.5–2)
LEUKOCYTE ESTERASE UR-ACNC: NEGATIVE — SIGNIFICANT CHANGE UP
LYMPHOCYTES # BLD AUTO: 37.6 % — SIGNIFICANT CHANGE UP (ref 20.5–51.1)
LYMPHOCYTES # BLD AUTO: 4.17 K/UL — HIGH (ref 1.2–3.4)
MCHC RBC-ENTMCNC: 28.4 PG — SIGNIFICANT CHANGE UP (ref 27–31)
MCHC RBC-ENTMCNC: 34.3 G/DL — SIGNIFICANT CHANGE UP (ref 32–37)
MCV RBC AUTO: 82.6 FL — SIGNIFICANT CHANGE UP (ref 80–94)
MONOCYTES # BLD AUTO: 0.67 K/UL — HIGH (ref 0.1–0.6)
MONOCYTES NFR BLD AUTO: 6 % — SIGNIFICANT CHANGE UP (ref 1.7–9.3)
NEUTROPHILS # BLD AUTO: 5.9 K/UL — SIGNIFICANT CHANGE UP (ref 1.4–6.5)
NEUTROPHILS NFR BLD AUTO: 53.1 % — SIGNIFICANT CHANGE UP (ref 42.2–75.2)
NITRITE UR-MCNC: NEGATIVE — SIGNIFICANT CHANGE UP
NRBC # BLD: 0 /100 WBCS — SIGNIFICANT CHANGE UP (ref 0–0)
NT-PROBNP SERPL-SCNC: <5 PG/ML — SIGNIFICANT CHANGE UP (ref 0–300)
PCO2 BLDV: 45 MMHG — SIGNIFICANT CHANGE UP (ref 42–55)
PH BLDV: 7.38 — SIGNIFICANT CHANGE UP (ref 7.32–7.43)
PH UR: 6 — SIGNIFICANT CHANGE UP (ref 5–8)
PLATELET # BLD AUTO: 254 K/UL — SIGNIFICANT CHANGE UP (ref 130–400)
PMV BLD: 9.7 FL — SIGNIFICANT CHANGE UP (ref 7.4–10.4)
PO2 BLDV: 51 MMHG — SIGNIFICANT CHANGE UP
POTASSIUM BLDV-SCNC: 4.1 MMOL/L — SIGNIFICANT CHANGE UP (ref 3.5–5.1)
POTASSIUM SERPL-MCNC: 4.4 MMOL/L — SIGNIFICANT CHANGE UP (ref 3.5–5)
POTASSIUM SERPL-SCNC: 4.4 MMOL/L — SIGNIFICANT CHANGE UP (ref 3.5–5)
PROT SERPL-MCNC: 7.5 G/DL — SIGNIFICANT CHANGE UP (ref 6–8)
PROT UR-MCNC: SIGNIFICANT CHANGE UP
RBC # BLD: 5.36 M/UL — SIGNIFICANT CHANGE UP (ref 4.7–6.1)
RBC # FLD: 13.9 % — SIGNIFICANT CHANGE UP (ref 11.5–14.5)
RSV RNA NPH QL NAA+NON-PROBE: SIGNIFICANT CHANGE UP
SAO2 % BLDV: 83.8 % — SIGNIFICANT CHANGE UP
SARS-COV-2 RNA SPEC QL NAA+PROBE: SIGNIFICANT CHANGE UP
SODIUM SERPL-SCNC: 138 MMOL/L — SIGNIFICANT CHANGE UP (ref 135–146)
SP GR SPEC: 1.02 — SIGNIFICANT CHANGE UP (ref 1.01–1.03)
TROPONIN T SERPL-MCNC: <0.01 NG/ML — SIGNIFICANT CHANGE UP
TROPONIN T SERPL-MCNC: <0.01 NG/ML — SIGNIFICANT CHANGE UP
UROBILINOGEN FLD QL: SIGNIFICANT CHANGE UP
WBC # BLD: 11.1 K/UL — HIGH (ref 4.8–10.8)
WBC # FLD AUTO: 11.1 K/UL — HIGH (ref 4.8–10.8)

## 2023-04-12 PROCEDURE — 85018 HEMOGLOBIN: CPT

## 2023-04-12 PROCEDURE — 84484 ASSAY OF TROPONIN QUANT: CPT

## 2023-04-12 PROCEDURE — 82803 BLOOD GASES ANY COMBINATION: CPT

## 2023-04-12 PROCEDURE — 71046 X-RAY EXAM CHEST 2 VIEWS: CPT | Mod: 26

## 2023-04-12 PROCEDURE — 99053 MED SERV 10PM-8AM 24 HR FAC: CPT

## 2023-04-12 PROCEDURE — 36415 COLL VENOUS BLD VENIPUNCTURE: CPT

## 2023-04-12 PROCEDURE — 99285 EMERGENCY DEPT VISIT HI MDM: CPT | Mod: 25

## 2023-04-12 PROCEDURE — 0241U: CPT

## 2023-04-12 PROCEDURE — 83605 ASSAY OF LACTIC ACID: CPT

## 2023-04-12 PROCEDURE — 85014 HEMATOCRIT: CPT

## 2023-04-12 PROCEDURE — 82010 KETONE BODYS QUAN: CPT

## 2023-04-12 PROCEDURE — 71046 X-RAY EXAM CHEST 2 VIEWS: CPT

## 2023-04-12 PROCEDURE — 99285 EMERGENCY DEPT VISIT HI MDM: CPT

## 2023-04-12 PROCEDURE — 84132 ASSAY OF SERUM POTASSIUM: CPT

## 2023-04-12 PROCEDURE — 82330 ASSAY OF CALCIUM: CPT

## 2023-04-12 PROCEDURE — 93005 ELECTROCARDIOGRAM TRACING: CPT

## 2023-04-12 PROCEDURE — 82962 GLUCOSE BLOOD TEST: CPT

## 2023-04-12 PROCEDURE — 93010 ELECTROCARDIOGRAM REPORT: CPT

## 2023-04-12 PROCEDURE — 80053 COMPREHEN METABOLIC PANEL: CPT

## 2023-04-12 PROCEDURE — 83880 ASSAY OF NATRIURETIC PEPTIDE: CPT

## 2023-04-12 PROCEDURE — 85025 COMPLETE CBC W/AUTO DIFF WBC: CPT

## 2023-04-12 PROCEDURE — 81003 URINALYSIS AUTO W/O SCOPE: CPT

## 2023-04-12 PROCEDURE — 87086 URINE CULTURE/COLONY COUNT: CPT

## 2023-04-12 PROCEDURE — 84295 ASSAY OF SERUM SODIUM: CPT

## 2023-04-12 RX ORDER — MECLIZINE HCL 12.5 MG
1 TABLET ORAL
Qty: 12 | Refills: 0
Start: 2023-04-12 | End: 2023-04-14

## 2023-04-12 RX ORDER — MECLIZINE HCL 12.5 MG
25 TABLET ORAL ONCE
Refills: 0 | Status: COMPLETED | OUTPATIENT
Start: 2023-04-12 | End: 2023-04-12

## 2023-04-12 RX ORDER — SODIUM CHLORIDE 9 MG/ML
1000 INJECTION INTRAMUSCULAR; INTRAVENOUS; SUBCUTANEOUS ONCE
Refills: 0 | Status: COMPLETED | OUTPATIENT
Start: 2023-04-12 | End: 2023-04-12

## 2023-04-12 RX ADMIN — SODIUM CHLORIDE 1000 MILLILITER(S): 9 INJECTION INTRAMUSCULAR; INTRAVENOUS; SUBCUTANEOUS at 02:21

## 2023-04-12 RX ADMIN — Medication 25 MILLIGRAM(S): at 05:26

## 2023-04-12 NOTE — ED PROVIDER NOTE - PATIENT PORTAL LINK FT
You can access the FollowMyHealth Patient Portal offered by [facility] by registering at the following website: [email or web address]. By joining [employer]’s FollowMyHealth portal, you will also be able to view your health information using other applications (apps) compatible with our system.

## 2023-04-12 NOTE — ED PROVIDER NOTE - ATTENDING APP SHARED VISIT CONTRIBUTION OF CARE
51 y/o male with a PMH of HTN, GERD, HLD, L2-3 disc herniation s/p microdiscectomy 6/28, R peroneal and L PT DVT s/p IVC filter on 6/28 (Dr. Whitmore) presents to the ED for evaluation of dizziness when he moves his head that began tonight associated with sweats. pt reports 1 month of on and off chest pain lasting a few seconds. +sweats, pt reports polyuria/polydipsia/polyphagia. pt reports a week ago he checked his sugar after eating and it was in the 170s so last week he took his cousins metformin for about 4 days. pt had nuc stress test 4/26/21 which showed Impression:1. NO DEFINITE EVIDENCE FOR ISCHEMIA DURING LEXISCAN INFUSION. 2. NORMAL RESTING LEFT VENTRICULAR WALL MOTION AND WALL THICKENING. 3. LEFT VENTRICULAR EJECTION FRACTION OF 62 % WHICH IS WITHIN RANGE OF NORMAL. pt had CCTA 10/22/2020 and had CADRADS 2. pt denies fever, chills, sob, back pain, abdominal pain, n/v/d/c, polydipsia, headache, visual changes, weakness, numbness, tingling, recent head trauma, use of blood thinners, or ringing in the ears.     Exam as per PA note. Will check labs, XR, EKG, UA and reassess.    ALL: nkda  Meds: metformin (not his own prescription), amlodipine 10 mg qd  SH no smoking +etoh occ  [facility] ***Majority of history obtained from EMR as pt only admits to having history of HTN and no surgeries when I initially questioned him.     51 y/o male with a PMH of HTN, GERD, HLD, L2-3 disc herniation s/p microdiscectomy 6/28, R peroneal and L PT DVT s/p IVC filter on 6/28 (Dr. Whitmore) presents to the ED for evaluation of dizziness when he moves his head that began tonight associated with sweats. pt reports 1 month of on and off chest pain lasting a few seconds. +sweats, pt reports +polyuria/polydipsia/polyphagia and that urine is now foul smelling. pt reports a week ago he checked his sugar after eating and it was in the 170s so last week he took his cousins metformin for about 4 days. pt had nuc stress test 4/26/21 which showed Impression:1. NO DEFINITE EVIDENCE FOR ISCHEMIA DURING LEXISCAN INFUSION. 2. NORMAL RESTING LEFT VENTRICULAR WALL MOTION AND WALL THICKENING. 3. LEFT VENTRICULAR EJECTION FRACTION OF 62 % WHICH IS WITHIN RANGE OF NORMAL. pt had CCTA 10/22/2020 and had CADRADS 2. pt denies fever, chills, sob, back pain, abdominal pain, n/v/d/c, , headache, visual changes, weakness, numbness, tingling, recent head trauma, use of blood thinners, or ringing in the ears.     Exam as per PA note. Will check labs, XR, EKG, UA and reassess.    ALL: nkda  Meds: metformin (not his own prescription), amlodipine 10 mg qd  SH no smoking +etoh occ  [facility]

## 2023-04-12 NOTE — ED PROVIDER NOTE - OBJECTIVE STATEMENT
51 y/o male with a PMH of HTN, GERD, HLD, L2-3 disc herniation s/p microdiscectomy 6/28, R peroneal and L PT DVT s/p IVC filter on 6/28 (Dr. Whitmore) presents to the ED for evaluation of dizziness when he moves his head that began tonight associated with sweats. pt reports 1 month of on and off chest pain lasting a few seconds. pt reports polyuria. pt reports a week ago he checked his sugar after eating and it was in the 170s so last week he took his cousins metformin for about 4 days. pt had nuc stress test 4/26/21 which showed Impression:1. NO DEFINITE EVIDENCE FOR ISCHEMIA DURING LEXISCAN INFUSION. 2. NORMAL RESTING LEFT VENTRICULAR WALL MOTION AND WALL THICKENING. 3. LEFT VENTRICULAR EJECTION FRACTION OF 62 % WHICH IS WITHIN RANGE OF NORMAL. pt had CCTA 10/22/2020 and had CADRADS 2. pt denies fever, chills, sob, back pain, abdominal pain, n/v/d/c, polydipsia, headache, visual changes, weakness, numbness, tingling, recent head trauma, use of blood thinners, or ringing in the ears.

## 2023-04-12 NOTE — ED PROVIDER NOTE - PHYSICAL EXAMINATION
Physical Exam    Vital Signs: I have reviewed the initial vital signs.  Constitutional: appears stated age, no acute distress  EYES: No nystagmus. PERRLA, EOMI.   ENT: Oropharynx is clear with lesions. uvula midline. no tonsillar erythema, edema, or exudates. no stridor. pta pta. floor of the mouth is soft without pus.   Cardiovascular: S1 and S2, regular rate, regular rhythm, well-perfused extremities, radial pulses equal and 2+ b/l.   Respiratory: unlabored respiratory effort, clear to auscultation bilaterally no wheezing, rales and rhonchi. pt is speaking full sentences. no accessory muscle use.   Gastrointestinal: soft, non-tender, nondistended abdomen, no pulsatile mass, normal bowl sounds, no rebound, no guarding  Musculoskeletal: supple neck, no lower extremity edema, no calf tenderness  Integumentary: warm, dry, no rash  Neurologic: awake, alert, cranial nerves II-XII grossly intact, extremities’ motor and sensory functions grossly intact. finger to nose intact. negative pronator drift. 5/5 strength throughout. steady gait.   Psychiatric: appropriate mood, appropriate affect

## 2023-04-12 NOTE — ED PROVIDER NOTE - CLINICAL SUMMARY MEDICAL DECISION MAKING FREE TEXT BOX
53 yo M poor historian with complaint of cp (described as pinching), dizziness, and foul-smelling/frequent urination associated with recent elevated blood sugars at home. Neuro exam neg, with no signs of stroke on exam or history. Therefore labs, ekg, xray ordered. 2 sets of trop neg, UA neg, blood sugar here 126 with a normal anion gap, neg beta hydroxy butyrate, neg UA, EKG NSR no signs of ischemia, XR neg. A1C pending and can be followed by his doctor in medical clinic. Pt given Meclizine in ER and sent with a script per his request. Workup for acute dangerous pathology neg in ER, therefore recommend outpatient follow up in medical clinic within the next few days. Return precautions provided.

## 2023-04-12 NOTE — ED PROVIDER NOTE - NSFOLLOWUPINSTRUCTIONS_ED_ALL_ED_FT
Dizziness    WHAT YOU NEED TO KNOW:    Dizziness is a feeling of being off balance or unsteady. Common causes of dizziness are an inner ear fluid imbalance or a lack of oxygen in your blood. Dizziness may be acute (lasts 3 days or less) or chronic (lasts longer than 3 days). You may have dizzy spells that last from seconds to a few hours.     DISCHARGE INSTRUCTIONS:    Return to the emergency department if:     You have a headache and a stiff neck.      You have shaking chills and a fever.       You vomit over and over with no relief.       Your vomit or bowel movements are red or black.       You have pain in your chest, back, or abdomen.       You have numbness, especially in your face, arms, or legs.       You have trouble moving your arms or legs.       You are confused.     Contact your healthcare provider if:     You have a fever.       Your symptoms do not get better with treatment.       You have questions or concerns about your condition or care.     Manage your symptoms:     Do not drive or operate heavy machinery when you are dizzy.       Get up slowly from sitting or lying down.       Drink plenty of liquids. Liquids help prevent dehydration. Ask how much liquid to drink each day and which liquids are best for you.    Follow up with your healthcare provider as directed: Write down your questions so you remember to ask them during your visits.        © Copyright [employer] 2019 All illustrations and images included in CareNotes are the copyrighted property of [employer] or [employer].

## 2023-04-12 NOTE — ED ADULT NURSE NOTE - OBJECTIVE STATEMENT
Pt is c/o feeling dizziness for weeks, no N/V. pt said he is a , and always get dizzy exp when he takes a bend. His whole body feels light, and whole bus feels pinning

## 2023-04-12 NOTE — ED PROVIDER NOTE - NSFOLLOWUPCLINICS_GEN_ALL_ED_FT
[facility]  Medicine  [address], [place]   Phone: [phone]  Fax:   Follow Up Time: 7-10 Days

## 2023-04-12 NOTE — ED PROVIDER NOTE - PROGRESS NOTE DETAILS
FF: pt reports he is feeling well. I discussed lab and radiology results with pt. pt advised of return precautions discussed at bedside. agreeable to dc. f/u with med clinic. I discussed with pt to f/u with med clinic also in regards to a1c.

## 2023-04-12 NOTE — ED PROVIDER NOTE - NS ED ATTENDING STATEMENT MOD
This was a shared visit with the CORA. I reviewed and verified the documentation and independently performed the documented:

## 2023-04-12 NOTE — ED ADULT TRIAGE NOTE - CHIEF COMPLAINT QUOTE
Patient presents to ED c/o dizziness on movement. Hx of vertigo, does not have medication at home. BEFAST negative.  in triage.

## 2023-04-13 LAB
CULTURE RESULTS: NO GROWTH — SIGNIFICANT CHANGE UP
SPECIMEN SOURCE: SIGNIFICANT CHANGE UP

## 2023-04-17 NOTE — PHYSICAL THERAPY INITIAL EVALUATION ADULT - ADDITIONAL COMMENTS
I have sent medications and/or lab orders in for this patient.  Please notify the patient.     Orders Placed This Encounter   Procedures    Ambulatory referral/consult to Endocrinology     Standing Status:   Future     Standing Expiration Date:   5/17/2024     Referral Priority:   Routine     Referral Type:   Consultation     Requested Specialty:   Endocrinology     Number of Visits Requested:   1              
One month prior to admission, patient was independent in ambulation, transfers and ADL's without assistive device. Patient went to [facility] a month ago and report pain on low back and weakness on BLE. Since then, patient has been bed bound due to severe weakness on BLE.

## 2023-05-12 ENCOUNTER — OUTPATIENT (OUTPATIENT)
Dept: OUTPATIENT SERVICES | Facility: HOSPITAL | Age: 53
LOS: 1 days | End: 2023-05-12
Payer: COMMERCIAL

## 2023-05-12 ENCOUNTER — APPOINTMENT (OUTPATIENT)
Dept: INTERNAL MEDICINE | Facility: CLINIC | Age: 53
End: 2023-05-12
Payer: COMMERCIAL

## 2023-05-12 VITALS
TEMPERATURE: 97.2 F | WEIGHT: 265 LBS | SYSTOLIC BLOOD PRESSURE: 124 MMHG | HEART RATE: 84 BPM | DIASTOLIC BLOOD PRESSURE: 79 MMHG | OXYGEN SATURATION: 97 % | HEIGHT: 68 IN | BODY MASS INDEX: 40.16 KG/M2

## 2023-05-12 DIAGNOSIS — I82.90 ACUTE EMBOLISM AND THROMBOSIS OF UNSPECIFIED VEIN: Chronic | ICD-10-CM

## 2023-05-12 DIAGNOSIS — N20.0 CALCULUS OF KIDNEY: Chronic | ICD-10-CM

## 2023-05-12 DIAGNOSIS — J45.909 UNSPECIFIED ASTHMA, UNCOMPLICATED: ICD-10-CM

## 2023-05-12 DIAGNOSIS — K21.9 GASTRO-ESOPHAGEAL REFLUX DISEASE W/OUT ESOPHAGITIS: ICD-10-CM

## 2023-05-12 DIAGNOSIS — B36.9 SUPERFICIAL MYCOSIS, UNSPECIFIED: ICD-10-CM

## 2023-05-12 DIAGNOSIS — Z00.00 ENCOUNTER FOR GENERAL ADULT MEDICAL EXAMINATION W/OUT ABNORMAL FINDINGS: ICD-10-CM

## 2023-05-12 DIAGNOSIS — Z00.00 ENCOUNTER FOR GENERAL ADULT MEDICAL EXAMINATION WITHOUT ABNORMAL FINDINGS: ICD-10-CM

## 2023-05-12 DIAGNOSIS — E78.5 HYPERLIPIDEMIA, UNSPECIFIED: ICD-10-CM

## 2023-05-12 DIAGNOSIS — N52.9 MALE ERECTILE DYSFUNCTION, UNSPECIFIED: ICD-10-CM

## 2023-05-12 DIAGNOSIS — I10 ESSENTIAL (PRIMARY) HYPERTENSION: ICD-10-CM

## 2023-05-12 DIAGNOSIS — R73.03 PREDIABETES.: ICD-10-CM

## 2023-05-12 DIAGNOSIS — M51.26 OTHER INTERVERTEBRAL DISC DISPLACEMENT, LUMBAR REGION: Chronic | ICD-10-CM

## 2023-05-12 PROCEDURE — 99214 OFFICE O/P EST MOD 30 MIN: CPT

## 2023-05-12 PROCEDURE — 99214 OFFICE O/P EST MOD 30 MIN: CPT | Mod: GC

## 2023-05-12 RX ORDER — ALBUTEROL SULFATE 90 UG/1
108 (90 BASE) POWDER, METERED RESPIRATORY (INHALATION)
Qty: 3 | Refills: 6 | Status: ACTIVE | COMMUNITY
Start: 2020-08-11 | End: 1900-01-01

## 2023-05-12 RX ORDER — CLOTRIMAZOLE AND BETAMETHASONE DIPROPIONATE 10; .5 MG/G; MG/G
1-0.05 CREAM TOPICAL
Qty: 1 | Refills: 0 | Status: ACTIVE | COMMUNITY
Start: 2023-05-12 | End: 1900-01-01

## 2023-05-12 NOTE — PHYSICAL EXAM
[No Acute Distress] : no acute distress [Well Nourished] : well nourished [Well Developed] : well developed [Well-Appearing] : well-appearing [Normal Sclera/Conjunctiva] : normal sclera/conjunctiva [Normal Outer Ear/Nose] : the outer ears and nose were normal in appearance [No Respiratory Distress] : no respiratory distress  [No Accessory Muscle Use] : no accessory muscle use [Clear to Auscultation] : lungs were clear to auscultation bilaterally [Normal Rate] : normal rate  [Regular Rhythm] : with a regular rhythm [Normal S1, S2] : normal S1 and S2 [Soft] : abdomen soft [Non Tender] : non-tender [No Rash] : no rash [No Focal Deficits] : no focal deficits [de-identified] : obese  [de-identified] : + b/l groin dermatitis/discoloration

## 2023-05-12 NOTE — ASSESSMENT
[FreeTextEntry1] : Mr. Davis 52 year old male patient known to have HTN, DVT s/p IVC filter, Asthma, Gerd, BRISA, disc herniation s/p surgery 2020, erectile dysfunction presents to the clinic for a 3 month follow up visit.\par \par #Essential HTN: (controlled)\par - c/w amlodipine 10 mg daily\par \par #groin dermatitis\par - started on lotrisone\par \par #anal itching\par - f/u stool ova and parasite \par - c/w hemorrhoid cream  \par \par #vertigo/dizziness\par - c/w meclizine\par - ENT referral given\par \par #blurry vision ~1 year\par - HbA1 is 6.5 (06/2022)\par - advised on diet and lifestyle modification \par - discussed on starting metformin however wants repeat labs \par - opthal referral given\par \par #Asthma: (Controlled)\par - No exacerbations\par - c/w duonebs prn\par \par #Prediabetes:\par - HbA1 is 6.5 (06/2022)\par - advised on diet and lifestyle modification \par \par #Dyslipidemia:\par -  (slightly improving)\par - advised on diet and lifestyle modification \par \par #obesity\par - advised on diet and lifestyle modification \par \par #erectile dysfunction\par #penile nodule\par - followed by urology;  decline [employer] insurance \par - f/u scrotal ultrasound\par - requesting for different urology referral \par \par #b/l leg weakness; hx of disc herniation s/p surgery\par - neurology referral given in last visit \par \par #HCM\par - Colonoscopy referral given during last visit; reordered \par - routine labs ordered\par -RTC in 6 months/prn \par

## 2023-05-12 NOTE — HISTORY OF PRESENT ILLNESS
[FreeTextEntry1] : Follow up visit  [de-identified] : Mr. Davis 52 year old male patient known to have HTN, peroneal and L PT DVT s/p IVC filter on\par 6/28 (Dr. Whitmore), Asthma, Gerd, BRISA, disc herniation s/p surgery 2020, erectile dysfunction presents to the clinic for a 3 month follow up visit.\par \par Patient s/p ED discharge for dizzines dizziness when he moves his head that began tonight associated with sweats and was discharged from ED.\par \par Patient reports has a known history for vertigo; worsens on insomnia, currently on meclizine 25 q 6 hours prn. No tinnitus, hearing loss, headaches. \par \par Patient also reports blurry vision since past 1 year, requesting opthal eval. \par \par Patient also reports anal itching (hx of hemorrhoids), denies blood in stool, fever, chills. \par \par Also reports penile nodule since last 1 month? non tender, no fever, chills, discharge. \par \par pt had nuc stress test 4/26/21\par which showed Impression:1. NO DEFINITE EVIDENCE FOR ISCHEMIA DURING LEXISCAN\par INFUSION. 2. NORMAL RESTING LEFT VENTRICULAR WALL MOTION AND WALL THICKENING.\par 3. LEFT VENTRICULAR EJECTION FRACTION OF 62 % WHICH IS WITHIN RANGE OF NORMAL.\par pt had CCTA 10/22/2020 and had CADRADS 2\par

## 2023-05-16 DIAGNOSIS — I10 ESSENTIAL (PRIMARY) HYPERTENSION: ICD-10-CM

## 2023-05-16 DIAGNOSIS — K21.9 GASTRO-ESOPHAGEAL REFLUX DISEASE WITHOUT ESOPHAGITIS: ICD-10-CM

## 2023-05-16 DIAGNOSIS — N52.9 MALE ERECTILE DYSFUNCTION, UNSPECIFIED: ICD-10-CM

## 2023-05-16 DIAGNOSIS — J45.909 UNSPECIFIED ASTHMA, UNCOMPLICATED: ICD-10-CM

## 2023-05-16 DIAGNOSIS — E78.5 HYPERLIPIDEMIA, UNSPECIFIED: ICD-10-CM

## 2023-05-16 DIAGNOSIS — R73.03 PREDIABETES: ICD-10-CM

## 2023-11-21 NOTE — ED ADULT NURSE NOTE - CAS EDP DISCH TYPE
Physical Therapy    Patient not seen in therapy. Physical Therapy orders received, chart reviewed. Per discussion with OT, patient functioning at modified independent level and able to ambulate multiple laps around room using cane with steady gait. Skilled acute Physical Therapy services not warranted at this time. Physical Therapy to sign off. OBJECTIVE                      Documented in the chart in the following areas: Assessment/Plan.         Therapy procedure time and total treatment time can be found documented on the Time Entry flowsheet Home

## 2023-12-15 NOTE — ED ADULT NURSE NOTE - CHIEF COMPLAINT QUOTE
Patient presents to ED with complaints of right lower leg and ankle swelling and pain for 1 month. Patient reports leg is red, and painful to touch and hurts most when ambulating. RHEUMATOLOGY FOLLOW UP - TELE VISIT     The patient location is: [place]  The chief complaint leading to consultation is: Lupus follow up  Visit type: Virtual visit with synchronous audio and video  Total time spent with patient:  20 minutes  Each patient to whom he or she provides medical services by telemedicine is:  (1) informed of the relationship between the physician and patient and the respective role of any other health care provider with respect to management of the patient; and (2) notified that he or she may decline to receive medical services by telemedicine and may withdraw from such care at any time.    Chief complaints, HPI, ROS, EXAM, Assessment & Plans:-  Alisia Locoharley a 31 y.o. pleasant female seen today for follow-up visit.  Severe lupus associated with arthritis and class 5 glomerulonephritis with significant proteinuria here for follow-up.  Recently seen for ankle pain.  MRI showed tendonitis and was referred to podiatrist.  Saw a podiatrist and he advised her to wear NSAIDs, always wear tennis shoes and he is going to recommend a brace today.  No evidence of synovitis from lupus or joint effusion noted on MRI.  She denies any significant problems today.  80% improvement of ankle pain since not wearing slippers and wearing tennis shoes.  She is going later today to her podiatrist office to get the NSAIDs and boot.  No skin rash.  No joint swelling.  No prolonged morning stiffness.  No respirophasic chest pain.  Rheumatological review of system negative otherwise.  100% fist formation bilateral hands.    1. Systemic lupus erythematosus arthritis    2. SLE glomerulonephritis syndrome, WHO class V    3. Drug-induced immunodeficiency    4. Encounter for medication monitoring      Problem List Items Addressed This Visit       SLE glomerulonephritis syndrome, WHO class V    Overview      12/12/2018  Continue follow-up and management with Dr. Blair         Relevant Medications    predniSONE  (DELTASONE) 5 MG tablet    Systemic lupus erythematosus arthritis - Primary    Relevant Medications    predniSONE (DELTASONE) 5 MG tablet    Drug-induced immunodeficiency    Encounter for medication monitoring      Latest Reference Range & Units 11/13/23 10:56   WBC 3.90 - 12.70 K/uL  3.90 - 12.70 K/uL 2.91 (L)  2.91 (L)   RBC 4.00 - 5.40 M/uL  4.00 - 5.40 M/uL 4.74  4.74   Hemoglobin 12.0 - 16.0 g/dL  12.0 - 16.0 g/dL 11.1 (L)  11.1 (L)   Hematocrit 37.0 - 48.5 %  37.0 - 48.5 % 35.3 (L)  35.3 (L)   MCV 82 - 98 fL  82 - 98 fL 75 (L)  75 (L)   MCH 27.0 - 31.0 pg  27.0 - 31.0 pg 23.4 (L)  23.4 (L)   MCHC 32.0 - 36.0 g/dL  32.0 - 36.0 g/dL 31.4 (L)  31.4 (L)   RDW 11.5 - 14.5 %  11.5 - 14.5 % 17.1 (H)  17.1 (H)   Platelet Count 150 - 450 K/uL  150 - 450 K/uL 321  321   MPV 9.2 - 12.9 fL  9.2 - 12.9 fL 10.6  10.6   Gran % 38.0 - 73.0 %  38.0 - 73.0 % 38.9  38.9   Lymph % 18.0 - 48.0 %  18.0 - 48.0 % 46.7  46.7   Mono % 4.0 - 15.0 %  4.0 - 15.0 % 11.3  11.3   Eosinophil % 0.0 - 8.0 %  0.0 - 8.0 % 2.1  2.1   Basophil % 0.0 - 1.9 %  0.0 - 1.9 % 0.3  0.3   Immature Granulocytes 0.0 - 0.5 %  0.0 - 0.5 % 0.7 (H)  0.7 (H)   Gran # (ANC) 1.8 - 7.7 K/uL  1.8 - 7.7 K/uL 1.1 (L)  1.1 (L)   Lymph # 1.0 - 4.8 K/uL  1.0 - 4.8 K/uL 1.4  1.4   Mono # 0.3 - 1.0 K/uL  0.3 - 1.0 K/uL 0.3  0.3   Eos # 0.0 - 0.5 K/uL  0.0 - 0.5 K/uL 0.1  0.1   Baso # 0.00 - 0.20 K/uL  0.00 - 0.20 K/uL 0.01  0.01   Immature Grans (Abs) 0.00 - 0.04 K/uL  0.00 - 0.04 K/uL 0.02  0.02   nRBC 0 /100 WBC  0 /100 WBC 0  0   Differential Method  Automated  Automated   Sed Rate 0 - 36 mm/Hr 66 (H)   (L): Data is abnormally low  (H): Data is abnormally high     Latest Reference Range & Units 11/13/23 10:56   Sodium 136 - 145 mmol/L 139   Potassium 3.5 - 5.1 mmol/L 3.7   Chloride 95 - 110 mmol/L 109   CO2 23 - 29 mmol/L 23   Anion Gap 8 - 16 mmol/L 7 (L)   BUN 6 - 20 mg/dL 8   Creatinine 0.5 - 1.4 mg/dL 0.7   eGFR >60 mL/min/1.73 m^2 >60   Glucose 70 - 110 mg/dL 92    Calcium 8.7 - 10.5 mg/dL 8.4 (L)   ALP 55 - 135 U/L 60   PROTEIN TOTAL 6.0 - 8.4 g/dL 6.0   Albumin 3.5 - 5.2 g/dL 2.8 (L)   BILIRUBIN TOTAL 0.1 - 1.0 mg/dL 0.3   AST 10 - 40 U/L 19   ALT 10 - 44 U/L 13   CRP 0.0 - 8.2 mg/L 10.0 (H)   (L): Data is abnormally low  (H): Data is abnormally high     Latest Reference Range & Units 11/13/23 10:56   ds DNA Ab Negative 1:10  Positive !   DNA Titer  1:80   Complement (C-3) 50 - 180 mg/dL 93   Complement (C-4) 11 - 44 mg/dL 21   !: Data is abnormal   Latest Reference Range & Units Most Recent   HARPREET Negative <1:160  Positive !  10/10/13 18:25   HARPREET Screen Negative <1:160  Positive !  11/18/19 13:58   HARPREET HEP-2 Titer  Positive 1:1280 Speckled  11/18/19 13:58   ds DNA Ab Negative 1:10  Positive !  11/13/23 10:56   DNA Titer  1:80  11/13/23 10:56   Anti-SSA Antibody 0.00 - 19.99 .84 (H)  11/18/19 13:58   Anti-SSA Interpretation Negative  Positive !  11/18/19 13:58   Anti-SSB Antibody 0.00 - 19.99 EU 62.05 (H)  11/18/19 13:58   Anti-SSB Interpretation Negative  Positive !  11/18/19 13:58   Anti Sm Antibody 0.00 - 19.99 EU 7.51  11/18/19 13:58   Anti-Sm Interpretation Negative  Negative  11/18/19 13:58   Anti Sm/RNP Antibody 0.00 - 19.99 EU 7.56  11/18/19 13:58   Anti-Sm/RNP Interpretation Negative  Negative  11/18/19 13:58   ANCA Proteinase 3 <0.4 (Negative) U <0.2  7/8/23 03:22   Cytoplasmic Neutrophilic Ab <1:20 Titer <1:20  7/8/23 03:22   MPO <3.5 U/mL <0.2  7/8/23 03:22   Perinuclear (P-ANCA) <1:20 Titer <1:20  7/8/23 03:22   Complement (C-3) 50 - 180 mg/dL 93  11/13/23 10:56   Complement (C-4) 11 - 44 mg/dL 21  11/13/23 10:56   Protein, Serum 6.0 - 8.4 g/dL 5.4 (L)  9/12/23 14:04   Albumin grams/dl 3.35 - 5.55 g/dL 3.16 (L)  9/12/23 14:04   Alpha-1 grams/dl 0.17 - 0.41 g/dL 0.26  9/12/23 14:04   Alpha-2 0.43 - 0.99 g/dL 0.66  9/12/23 14:04   Beta 0.50 - 1.10 g/dL 0.65  9/12/23 14:04   Gamma 0.67 - 1.58 g/dL 0.66 (L)  9/12/23 14:04   Pathologist Interpretation SPE   REVIEWED  9/12/23 14:04   Pathologist Interpretation MOAIRA  REVIEWED  9/12/23 14:04   Immunofix Interp.  SEE COMMENT  9/12/23 14:04   Cryoglobulin, Qual Absent  Absent  5/8/23 10:25   !: Data is abnormal  (H): Data is abnormally high  (L): Data is abnormally low    Severe lupus arthritis and glomerulonephritis with class 5 disease with significant proteinuria on Saphnelo infusion every 4 weeks.  Significant difficulty in taking oral medications due to noncompliance.  Is not taking any oral medications at this time other than intermittent steroids when she feels really fatigued.  Continue Saphnelo every 4 weeks.  Drug induced immunodeficiency due to use of immunosuppressive drugs. Monitor carefully for infections. Advised to get immediate medical care if any infection. Also advised strict adherence to age appropriate vaccinations and cancer screenings with PCP.  She understands the risk of not taking lupus therapies which might affect her kidney irreversibly  being dependent on dialysis.  I have explained all of the above in detail and the patient understands all of the current recommendation(s). I have answered all questions to the best of my ability and to their complete satisfaction.   # Follow up in about 3 months (around 3/15/2024).      Past Medical History:   Diagnosis Date    Allergic rhinitis     Anemia     Condyloma acuminata     COVID-19 virus infection 07/2021    Encounter for blood transfusion     GERD (gastroesophageal reflux disease)     Hemolytic anemia associated with systemic lupus erythematosus 4/30/2023    History of fetal anomaly in prior pregnancy, currently pregnant, unspecified trimester 03/08/2017    Pacemaker placed    HSV-2 (herpes simplex virus 2) infection     Lupus nephritis     Mood disorder     Overweight(278.02)     Sjogren's syndrome     Systemic lupus complicating pregnancy 01/31/2019    Systemic lupus erythematosus     Thrombocytopenia        Past Surgical History:   Procedure  Laterality Date     SECTION WITH TUBAL LIGATION N/A 2019    Procedure:  SECTION, WITH TUBAL LIGATION;  Surgeon: VERONICA Valdovinos MD;  Location: [facility] L&D;  Service: OB/GYN;  Laterality: N/A;     SECTION, LOW TRANSVERSE      x2    LYMPH NODE BIOPSY Right 2023    Procedure: BIOPSY, LYMPH NODE;  Surgeon: Rivera Sandoval MD;  Location: [facility] OR;  Service: ENT;  Laterality: Right;  right deep cervial lymph node excision    NECK MASS EXCISION Right 2023    Procedure: EXCISION, MASS, NECK;  Surgeon: Rivera Sandoval MD;  Location: [facility] OR;  Service: ENT;  Laterality: Right;  right deep cervial lymph node excision    RENAL BIOPSY  2013        Social History     Tobacco Use    Smoking status: Never     Passive exposure: Never    Smokeless tobacco: Never   Substance Use Topics    Alcohol use: No     Alcohol/week: 0.0 standard drinks of alcohol    Drug use: Yes     Types: Marijuana       Family History   Problem Relation Age of Onset    Hypertension Mother     Eczema Brother     Hypertension Maternal Grandmother     Diabetes Paternal Grandmother     Heart disease Son     Arrhythmia Son         CHB    Stroke Neg Hx     Cancer Neg Hx        Review of patient's allergies indicates:  No Known Allergies    Medication List with Changes/Refills   Current Medications    AMLODIPINE (NORVASC) 5 MG TABLET    TAKE 1 TABLET(5 MG) BY MOUTH EVERY DAY    ARIPIPRAZOLE (ABILIFY) 2 MG TAB    TAKE 1 TABLET(2 MG) BY MOUTH EVERY DAY    CICLOPIROX (PENLAC) 8 % SOLN    Apply to nail and nail fold once daily for up to 1 year    COLCHICINE, GOUT, (COLCRYS) 0.6 MG TABLET    Take 1 tablet (0.6 mg total) by mouth once daily.    FLUOXETINE 40 MG CAPSULE    TAKE 1 CAPSULE(40 MG) BY MOUTH EVERY DAY    FLUTICASONE PROPIONATE (FLONASE) 50 MCG/ACTUATION NASAL SPRAY    SHAKE LIQUID AND USE 2 SPRAYS(100 MCG) IN EACH NOSTRIL EVERY DAY    HYDROXYCHLOROQUINE (PLAQUENIL) 200 MG TABLET    TAKE 2 TABLETS(400 MG) BY MOUTH EVERY  DAY    KETOCONAZOLE (NIZORAL) 2 % SHAMPOO    Wash hair with medicated shampoo at least 1x/week - let sit on scalp at least 5 minutes prior to rinsing    MOMETASONE (ELOCON) 0.1 % SOLUTION    AAA of scalp once daily.    MYCOPHENOLATE (CELLCEPT) 500 MG TAB    TAKE 1 TABLET(500 MG) BY MOUTH TWICE DAILY    ONDANSETRON (ZOFRAN) 4 MG TABLET    Take 1 tablet (4 mg total) by mouth every 8 (eight) hours as needed for Nausea.    PIMECROLIMUS (ELIDEL) 1 % CREAM    Apply to affected areas twice daily. Wear daily sunscreen.    PREGABALIN (LYRICA) 75 MG CAPSULE    Take 1 capsule (75 mg total) by mouth 2 (two) times daily.    TRAMADOL (ULTRAM) 50 MG TABLET    Take 1 tablet (50 mg total) by mouth every 8 (eight) hours as needed for Pain.    TRIAMCINOLONE ACETONIDE 0.025% (KENALOG) 0.025 % OINT    AAA bid prn. Use for 2 weeks then taper. Mild steroid.    VALACYCLOVIR (VALTREX) 1000 MG TABLET    Take 1 tablet (1,000 mg total) by mouth once daily.   Changed and/or Refilled Medications    Modified Medication Previous Medication    PREDNISONE (DELTASONE) 5 MG TABLET predniSONE (DELTASONE) 20 MG tablet       Take 1 tablet (5 mg total) by mouth once daily.    Take 1 tablet (20 mg total) by mouth once daily.       Disclaimer: This note was prepared using voice recognition system and is likely to have sound alike errors and is not proof read.  Please call me with any questions.

## 2023-12-26 ENCOUNTER — EMERGENCY (EMERGENCY)
Facility: HOSPITAL | Age: 53
LOS: 0 days | Discharge: HOME | End: 2023-12-27
Attending: STUDENT IN AN ORGANIZED HEALTH CARE EDUCATION/TRAINING PROGRAM
Payer: MEDICAID

## 2023-12-26 VITALS
HEIGHT: 69 IN | TEMPERATURE: 98 F | DIASTOLIC BLOOD PRESSURE: 94 MMHG | SYSTOLIC BLOOD PRESSURE: 189 MMHG | RESPIRATION RATE: 18 BRPM | WEIGHT: 270.07 LBS | OXYGEN SATURATION: 97 % | HEART RATE: 82 BPM

## 2023-12-26 DIAGNOSIS — Z87.891 PERSONAL HISTORY OF NICOTINE DEPENDENCE: ICD-10-CM

## 2023-12-26 DIAGNOSIS — R10.32 LEFT LOWER QUADRANT PAIN: ICD-10-CM

## 2023-12-26 DIAGNOSIS — N20.0 CALCULUS OF KIDNEY: Chronic | ICD-10-CM

## 2023-12-26 DIAGNOSIS — R07.9 CHEST PAIN, UNSPECIFIED: ICD-10-CM

## 2023-12-26 DIAGNOSIS — R07.0 PAIN IN THROAT: ICD-10-CM

## 2023-12-26 DIAGNOSIS — R42 DIZZINESS AND GIDDINESS: ICD-10-CM

## 2023-12-26 DIAGNOSIS — I82.90 ACUTE EMBOLISM AND THROMBOSIS OF UNSPECIFIED VEIN: Chronic | ICD-10-CM

## 2023-12-26 DIAGNOSIS — M51.26 OTHER INTERVERTEBRAL DISC DISPLACEMENT, LUMBAR REGION: Chronic | ICD-10-CM

## 2023-12-26 PROCEDURE — 85025 COMPLETE CBC W/AUTO DIFF WBC: CPT

## 2023-12-26 PROCEDURE — 84484 ASSAY OF TROPONIN QUANT: CPT

## 2023-12-26 PROCEDURE — 93010 ELECTROCARDIOGRAM REPORT: CPT

## 2023-12-26 PROCEDURE — 99285 EMERGENCY DEPT VISIT HI MDM: CPT

## 2023-12-26 PROCEDURE — 36415 COLL VENOUS BLD VENIPUNCTURE: CPT

## 2023-12-26 PROCEDURE — 83880 ASSAY OF NATRIURETIC PEPTIDE: CPT

## 2023-12-26 PROCEDURE — G0378: CPT

## 2023-12-26 PROCEDURE — 93005 ELECTROCARDIOGRAM TRACING: CPT | Mod: XU

## 2023-12-26 PROCEDURE — 80053 COMPREHEN METABOLIC PANEL: CPT

## 2023-12-26 PROCEDURE — 74177 CT ABD & PELVIS W/CONTRAST: CPT | Mod: MA

## 2023-12-26 PROCEDURE — 71045 X-RAY EXAM CHEST 1 VIEW: CPT

## 2023-12-26 PROCEDURE — 75574 CT ANGIO HRT W/3D IMAGE: CPT | Mod: MA

## 2023-12-26 PROCEDURE — 83690 ASSAY OF LIPASE: CPT

## 2023-12-26 RX ORDER — ASPIRIN/CALCIUM CARB/MAGNESIUM 324 MG
324 TABLET ORAL ONCE
Refills: 0 | Status: COMPLETED | OUTPATIENT
Start: 2023-12-26 | End: 2023-12-26

## 2023-12-26 NOTE — ED ADULT TRIAGE NOTE - CHIEF COMPLAINT QUOTE
I got pain in my chest and my arm (left) since this morning and I feel dizzy - patient  Patient non-compliant with Amlodipine 10 mg x 2 weeks because he ran out and due to holidays did not go to his PMD

## 2023-12-27 VITALS
TEMPERATURE: 98 F | RESPIRATION RATE: 18 BRPM | HEART RATE: 63 BPM | OXYGEN SATURATION: 97 % | DIASTOLIC BLOOD PRESSURE: 57 MMHG | SYSTOLIC BLOOD PRESSURE: 126 MMHG

## 2023-12-27 LAB
ALBUMIN SERPL ELPH-MCNC: 4.7 G/DL — SIGNIFICANT CHANGE UP (ref 3.5–5.2)
ALBUMIN SERPL ELPH-MCNC: 4.7 G/DL — SIGNIFICANT CHANGE UP (ref 3.5–5.2)
ALP SERPL-CCNC: 90 U/L — SIGNIFICANT CHANGE UP (ref 30–115)
ALP SERPL-CCNC: 90 U/L — SIGNIFICANT CHANGE UP (ref 30–115)
ALT FLD-CCNC: 35 U/L — SIGNIFICANT CHANGE UP (ref 0–41)
ALT FLD-CCNC: 35 U/L — SIGNIFICANT CHANGE UP (ref 0–41)
ANION GAP SERPL CALC-SCNC: 15 MMOL/L — HIGH (ref 7–14)
ANION GAP SERPL CALC-SCNC: 15 MMOL/L — HIGH (ref 7–14)
AST SERPL-CCNC: 30 U/L — SIGNIFICANT CHANGE UP (ref 0–41)
AST SERPL-CCNC: 30 U/L — SIGNIFICANT CHANGE UP (ref 0–41)
BASOPHILS # BLD AUTO: 0.06 K/UL — SIGNIFICANT CHANGE UP (ref 0–0.2)
BASOPHILS # BLD AUTO: 0.06 K/UL — SIGNIFICANT CHANGE UP (ref 0–0.2)
BASOPHILS NFR BLD AUTO: 0.4 % — SIGNIFICANT CHANGE UP (ref 0–1)
BASOPHILS NFR BLD AUTO: 0.4 % — SIGNIFICANT CHANGE UP (ref 0–1)
BILIRUB SERPL-MCNC: 0.5 MG/DL — SIGNIFICANT CHANGE UP (ref 0.2–1.2)
BILIRUB SERPL-MCNC: 0.5 MG/DL — SIGNIFICANT CHANGE UP (ref 0.2–1.2)
BUN SERPL-MCNC: 15 MG/DL — SIGNIFICANT CHANGE UP (ref 10–20)
BUN SERPL-MCNC: 15 MG/DL — SIGNIFICANT CHANGE UP (ref 10–20)
CALCIUM SERPL-MCNC: 9.1 MG/DL — SIGNIFICANT CHANGE UP (ref 8.4–10.5)
CALCIUM SERPL-MCNC: 9.1 MG/DL — SIGNIFICANT CHANGE UP (ref 8.4–10.5)
CHLORIDE SERPL-SCNC: 102 MMOL/L — SIGNIFICANT CHANGE UP (ref 98–110)
CHLORIDE SERPL-SCNC: 102 MMOL/L — SIGNIFICANT CHANGE UP (ref 98–110)
CO2 SERPL-SCNC: 21 MMOL/L — SIGNIFICANT CHANGE UP (ref 17–32)
CO2 SERPL-SCNC: 21 MMOL/L — SIGNIFICANT CHANGE UP (ref 17–32)
CREAT SERPL-MCNC: 0.9 MG/DL — SIGNIFICANT CHANGE UP (ref 0.7–1.5)
CREAT SERPL-MCNC: 0.9 MG/DL — SIGNIFICANT CHANGE UP (ref 0.7–1.5)
EGFR: 102 ML/MIN/1.73M2 — SIGNIFICANT CHANGE UP
EGFR: 102 ML/MIN/1.73M2 — SIGNIFICANT CHANGE UP
EOSINOPHIL # BLD AUTO: 0.3 K/UL — SIGNIFICANT CHANGE UP (ref 0–0.7)
EOSINOPHIL # BLD AUTO: 0.3 K/UL — SIGNIFICANT CHANGE UP (ref 0–0.7)
EOSINOPHIL NFR BLD AUTO: 2.2 % — SIGNIFICANT CHANGE UP (ref 0–8)
EOSINOPHIL NFR BLD AUTO: 2.2 % — SIGNIFICANT CHANGE UP (ref 0–8)
GLUCOSE SERPL-MCNC: 130 MG/DL — HIGH (ref 70–99)
GLUCOSE SERPL-MCNC: 130 MG/DL — HIGH (ref 70–99)
HCT VFR BLD CALC: 45.5 % — SIGNIFICANT CHANGE UP (ref 42–52)
HCT VFR BLD CALC: 45.5 % — SIGNIFICANT CHANGE UP (ref 42–52)
HGB BLD-MCNC: 16.1 G/DL — SIGNIFICANT CHANGE UP (ref 14–18)
HGB BLD-MCNC: 16.1 G/DL — SIGNIFICANT CHANGE UP (ref 14–18)
IMM GRANULOCYTES NFR BLD AUTO: 0.4 % — HIGH (ref 0.1–0.3)
IMM GRANULOCYTES NFR BLD AUTO: 0.4 % — HIGH (ref 0.1–0.3)
LIDOCAIN IGE QN: 17 U/L — SIGNIFICANT CHANGE UP (ref 7–60)
LIDOCAIN IGE QN: 17 U/L — SIGNIFICANT CHANGE UP (ref 7–60)
LYMPHOCYTES # BLD AUTO: 35.8 % — SIGNIFICANT CHANGE UP (ref 20.5–51.1)
LYMPHOCYTES # BLD AUTO: 35.8 % — SIGNIFICANT CHANGE UP (ref 20.5–51.1)
LYMPHOCYTES # BLD AUTO: 4.79 K/UL — HIGH (ref 1.2–3.4)
LYMPHOCYTES # BLD AUTO: 4.79 K/UL — HIGH (ref 1.2–3.4)
MCHC RBC-ENTMCNC: 29 PG — SIGNIFICANT CHANGE UP (ref 27–31)
MCHC RBC-ENTMCNC: 29 PG — SIGNIFICANT CHANGE UP (ref 27–31)
MCHC RBC-ENTMCNC: 35.4 G/DL — SIGNIFICANT CHANGE UP (ref 32–37)
MCHC RBC-ENTMCNC: 35.4 G/DL — SIGNIFICANT CHANGE UP (ref 32–37)
MCV RBC AUTO: 82 FL — SIGNIFICANT CHANGE UP (ref 80–94)
MCV RBC AUTO: 82 FL — SIGNIFICANT CHANGE UP (ref 80–94)
MONOCYTES # BLD AUTO: 0.84 K/UL — HIGH (ref 0.1–0.6)
MONOCYTES # BLD AUTO: 0.84 K/UL — HIGH (ref 0.1–0.6)
MONOCYTES NFR BLD AUTO: 6.3 % — SIGNIFICANT CHANGE UP (ref 1.7–9.3)
MONOCYTES NFR BLD AUTO: 6.3 % — SIGNIFICANT CHANGE UP (ref 1.7–9.3)
NEUTROPHILS # BLD AUTO: 7.33 K/UL — HIGH (ref 1.4–6.5)
NEUTROPHILS # BLD AUTO: 7.33 K/UL — HIGH (ref 1.4–6.5)
NEUTROPHILS NFR BLD AUTO: 54.9 % — SIGNIFICANT CHANGE UP (ref 42.2–75.2)
NEUTROPHILS NFR BLD AUTO: 54.9 % — SIGNIFICANT CHANGE UP (ref 42.2–75.2)
NRBC # BLD: 0 /100 WBCS — SIGNIFICANT CHANGE UP (ref 0–0)
NRBC # BLD: 0 /100 WBCS — SIGNIFICANT CHANGE UP (ref 0–0)
NT-PROBNP SERPL-SCNC: <5 PG/ML — SIGNIFICANT CHANGE UP (ref 0–300)
NT-PROBNP SERPL-SCNC: <5 PG/ML — SIGNIFICANT CHANGE UP (ref 0–300)
PLATELET # BLD AUTO: 196 K/UL — SIGNIFICANT CHANGE UP (ref 130–400)
PLATELET # BLD AUTO: 196 K/UL — SIGNIFICANT CHANGE UP (ref 130–400)
PMV BLD: 11.2 FL — HIGH (ref 7.4–10.4)
PMV BLD: 11.2 FL — HIGH (ref 7.4–10.4)
POTASSIUM SERPL-MCNC: 4.8 MMOL/L — SIGNIFICANT CHANGE UP (ref 3.5–5)
POTASSIUM SERPL-MCNC: 4.8 MMOL/L — SIGNIFICANT CHANGE UP (ref 3.5–5)
POTASSIUM SERPL-SCNC: 4.8 MMOL/L — SIGNIFICANT CHANGE UP (ref 3.5–5)
POTASSIUM SERPL-SCNC: 4.8 MMOL/L — SIGNIFICANT CHANGE UP (ref 3.5–5)
PROT SERPL-MCNC: 8.1 G/DL — HIGH (ref 6–8)
PROT SERPL-MCNC: 8.1 G/DL — HIGH (ref 6–8)
RBC # BLD: 5.55 M/UL — SIGNIFICANT CHANGE UP (ref 4.7–6.1)
RBC # BLD: 5.55 M/UL — SIGNIFICANT CHANGE UP (ref 4.7–6.1)
RBC # FLD: 13.5 % — SIGNIFICANT CHANGE UP (ref 11.5–14.5)
RBC # FLD: 13.5 % — SIGNIFICANT CHANGE UP (ref 11.5–14.5)
SODIUM SERPL-SCNC: 138 MMOL/L — SIGNIFICANT CHANGE UP (ref 135–146)
SODIUM SERPL-SCNC: 138 MMOL/L — SIGNIFICANT CHANGE UP (ref 135–146)
TROPONIN T, HIGH SENSITIVITY RESULT: 10 NG/L — SIGNIFICANT CHANGE UP (ref 6–21)
TROPONIN T, HIGH SENSITIVITY RESULT: 10 NG/L — SIGNIFICANT CHANGE UP (ref 6–21)
TROPONIN T, HIGH SENSITIVITY RESULT: 11 NG/L — SIGNIFICANT CHANGE UP (ref 6–21)
TROPONIN T, HIGH SENSITIVITY RESULT: 11 NG/L — SIGNIFICANT CHANGE UP (ref 6–21)
WBC # BLD: 13.38 K/UL — HIGH (ref 4.8–10.8)
WBC # BLD: 13.38 K/UL — HIGH (ref 4.8–10.8)
WBC # FLD AUTO: 13.38 K/UL — HIGH (ref 4.8–10.8)
WBC # FLD AUTO: 13.38 K/UL — HIGH (ref 4.8–10.8)

## 2023-12-27 PROCEDURE — 75574 CT ANGIO HRT W/3D IMAGE: CPT | Mod: 26,MA

## 2023-12-27 PROCEDURE — 74177 CT ABD & PELVIS W/CONTRAST: CPT | Mod: 26,MA

## 2023-12-27 PROCEDURE — 71045 X-RAY EXAM CHEST 1 VIEW: CPT | Mod: 26

## 2023-12-27 PROCEDURE — 93010 ELECTROCARDIOGRAM REPORT: CPT

## 2023-12-27 PROCEDURE — 99236 HOSP IP/OBS SAME DATE HI 85: CPT

## 2023-12-27 RX ORDER — ASPIRIN/CALCIUM CARB/MAGNESIUM 324 MG
1 TABLET ORAL
Qty: 30 | Refills: 0
Start: 2023-12-27 | End: 2024-01-25

## 2023-12-27 RX ORDER — ATORVASTATIN CALCIUM 80 MG/1
1 TABLET, FILM COATED ORAL
Qty: 30 | Refills: 0
Start: 2023-12-27 | End: 2024-01-25

## 2023-12-27 RX ADMIN — Medication 324 MILLIGRAM(S): at 02:16

## 2023-12-27 NOTE — ED CDU PROVIDER DISPOSITION NOTE - CARE PROVIDER_API CALL
Kobe Jimenez  Interventional Cardiology  [address] [place] [address], [address]  [place] [address]  Phone: [phone]  Fax: [phone]  Follow Up Time:    Kobe Jimenez  Interventional Cardiology  [address] [place] [address], [address]  [place] [address]  Phone: [phone]  Fax: [phone]  Follow Up Time:

## 2023-12-27 NOTE — ED CDU PROVIDER INITIAL DAY NOTE - PHYSICAL EXAMINATION
VITAL SIGNS: noted  CONSTITUTIONAL: Well-developed; well-nourished; in no acute distress  HEAD: Normocephalic; atraumatic  EYES: PERRL, EOM intact; conjunctiva and sclera clear  ENT: No nasal discharge; airway clear. MMM  NECK: Supple; non tender.    CARD: S1, S2 normal; no murmurs, gallops, or rubs. Regular rate and rhythm  RESP: CTAB/L, no wheezes, rales or rhonchi  ABD: Normal bowel sounds; soft; non-distended; mild LLQ tenderness, no R/G   EXT: Normal ROM. No calf tenderness or edema. Distal pulses intact  NEURO: Alert, oriented. Grossly unremarkable. No focal deficits  SKIN: Skin exam is warm and dry, no acute rash

## 2023-12-27 NOTE — ED PROVIDER NOTE - NS ED ROS FT
Constitutional: see HPI  Eyes:  No visual changes, eye pain or discharge.  ENMT:  No hearing changes, pain, discharge or infections. No neck pain or stiffness.  Cardiac:  see HPI  Respiratory:  No cough or respiratory distress. No hemoptysis. No history of asthma or RAD.  GI:  No nausea, vomiting, diarrhea; + LLQ abdominal pain.  :  No dysuria, frequency or burning.  MS:  No myalgia, muscle weakness, joint pain or back pain.  Neuro:  No headache or weakness.  No LOC.  Skin:  No skin rash.   Endocrine: No history of thyroid disease or diabetes.

## 2023-12-27 NOTE — ED CDU PROVIDER DISPOSITION NOTE - CLINICAL COURSE
Pt placed in OBS for CP. Trop neg. cxr clear. ekg no stemi. Pt pain free and remained so while in OBS. CCTA: CADRAD 1. IMP: CP- does not seem cardiac.  Discussed all available results with Pt.  Pt understands results, plan of care, start asa, statin, cardiology outpt follow up as discussed, and signs and symptoms for ED return.  Pt is comfortable with discharge. DC home.

## 2023-12-27 NOTE — ED PROVIDER NOTE - OBJECTIVE STATEMENT
53-year-old male with PMH HTN presents for evaluation of left-sided chest pain radiating to left arm that started this morning.  Patient states left arm has been aching but chest pain has been intermittent.  Associated with some throat tightness.  No diaphoresis, nausea, vomiting, diarrhea.  Reports some left lower quadrant pain has been ongoing for a month but has not changed in quality.  Chest pain however is new since this morning.  Has not had previous cardiac workup.  Denies any exertional complaints, shortness of palpitations.  Feels some mild dizziness.  No headache, focal weakness or ataxia. No urinary complaints, melena or hematochezia.

## 2023-12-27 NOTE — ED ADULT NURSE REASSESSMENT NOTE - NS ED NURSE REASSESS COMMENT FT1
Pt reassessed A/O times 4 Vs stable on cardiac monitor denies abdomen pain denies chest pain no N/V/D, pt is seen evaluate by Ed attending with order for CTA  ready to be transport with transporter . Pt reassessed A/O times 4 Vs stable on cardiac monitor denies abdomen pain denies chest pain ,denies any  left arm no N/V/D, pt is seen evaluate by Ed attending with order for CTA  ready to be transport with transporter .

## 2023-12-27 NOTE — ED PROVIDER NOTE - CLINICAL SUMMARY MEDICAL DECISION MAKING FREE TEXT BOX
Patient evaluated for chest discomfort, labs reviewed, chest x-ray without acute pathology noted.  Concern for ACS, patient placed in EDOU for continued monitoring and further evaluation of chest pain.

## 2023-12-27 NOTE — ED ADULT NURSE NOTE - NSFALLUNIVINTERV_ED_ALL_ED
Bed/Stretcher in lowest position, wheels locked, appropriate side rails in place/Call bell, personal items and telephone in reach/Instruct patient to call for assistance before getting out of bed/chair/stretcher/Non-slip footwear applied when patient is off stretcher/[place] to call system/Physically safe environment - no spills, clutter or unnecessary equipment/Purposeful proactive rounding/Room/bathroom lighting operational, light cord in reach Bed/Stretcher in lowest position, wheels locked, appropriate side rails in place/Call bell, personal items and telephone in reach/Instruct patient to call for assistance before getting out of bed/chair/stretcher/Non-slip footwear applied when patient is off stretcher/[place] to call system/Physically safe environment - no spills, clutter or unnecessary equipment/Purposeful proactive rounding/Room/bathroom lighting operational, light cord in reach

## 2023-12-27 NOTE — ED CDU PROVIDER DISPOSITION NOTE - NSFOLLOWUPCLINICS_GEN_ALL_ED_FT
[facility]  Medicine  [address], [place]   Phone: [phone]  Fax:      [facility]  Medicine  [address], [place]   Phone: [phone]  Fax:

## 2023-12-27 NOTE — ED PROVIDER NOTE - PHYSICAL EXAMINATION
VITAL SIGNS: noted  CONSTITUTIONAL: Well-developed; well-nourished; in no acute distress  HEAD: Normocephalic; atraumatic  EYES: PERRL, EOM intact; conjunctiva and sclera clear  ENT: No nasal discharge; airway clear. MMM  NECK: Supple; non tender. No anterior cervical lymphadenopathy noted  CARD: S1, S2 normal; no murmurs, gallops, or rubs. Regular rate and rhythm  RESP: CTAB/L, no wheezes, rales or rhonchi  ABD: Normal bowel sounds; soft; non-distended; +LLQ tenderness without any rebound or guarding,  no CVA tenderness  EXT: Normal ROM. No calf tenderness or edema. Distal pulses intact  NEURO: Alert, oriented. Grossly unremarkable. No focal deficits  SKIN: Skin exam is warm and dry, no acute rash  MS: No midline spinal tenderness

## 2023-12-27 NOTE — ED CDU PROVIDER INITIAL DAY NOTE - PROGRESS NOTE DETAILS
Pt s/o to Dr. Zamora to reassess, further monitor and evaluate in EDOU and dispo. Pt stable seen resting comfortably at bedside without acute complaints. trop neg. Pending ccta

## 2023-12-27 NOTE — ED CDU PROVIDER DISPOSITION NOTE - PATIENT PORTAL LINK FT
You can access the FollowMyHealth Patient Portal offered by [facility] by registering at the following website: [email or web address]. By joining [employer]’s FollowMyHealth portal, you will also be able to view your health information using other applications (apps) compatible with our system. You can access the FollowMyHealth Patient Portal offered by [facility] by registering at the following website: [email or web address]. By joining [employer]’s FollowMyHealth portal, you will also be able to view your health information using other applications (apps) compatible with our system.

## 2024-01-25 NOTE — ED PROVIDER NOTE - CLINICAL SUMMARY MEDICAL DECISION MAKING FREE TEXT BOX
NIBP STAT measurement started. Patient presented with chest pain, NORTON, orthopnea x several months, worsening in the past few days. Otherwise afebrile, Hd stable, well appearing. EKG obtained and non-ischemic without evidence of STEMI. Obtained labs which were grossly unremarkable including no significant leukocytosis, anemia, signs of dehydration/ROEL, transaminitis or significant electrolyte abnormalities. Trop negative. Chest xray negative for pneumothorax, pneumonia, widened mediastinum, evidence of rib fractures, enlarged cardiac silhouette or any other emergent pathologies. Patient remained without recurrence of chest pain or abnormalities during monitoring in ED. However, story concerning for ACS and therefore observation for stress testing recommended. Patient refused this however, stating he cannot stay for further testing. Explained risks of leaving AMA as above and patient verbalized understanding. Will sign out AMA.    The patient wishes to leave against medical advice.  I have discussed the risks, benefits and alternatives (including the possibility of worsening of disease, pain, permanent disability, and/or death) with the patient and his/her family (if available).  The patient voices understanding of these risks, benefits, and alternatives and still wishes to sign out against medical advice.  The patient is awake, alert, oriented  x 3 and has demonstrated capacity to refuse/direct care.  I have advised the patient that they can and should return immediately should they develop any worse/different/additional symptoms, or if they change their mind and want to continue their care.

## 2024-02-20 NOTE — ED PROVIDER NOTE - NS_EDPROVIDERDISPOUSERTYPE_ED_A_ED
[facility] Delgado's desk to process   Attending Attestation (For Attendings USE Only)...

## 2024-03-24 RX ORDER — AMLODIPINE BESYLATE 10 MG/1
10 TABLET ORAL DAILY
Qty: 90 | Refills: 0 | Status: ACTIVE | COMMUNITY
Start: 2020-09-15 | End: 1900-01-01

## 2024-03-24 RX ORDER — BUDESONIDE AND FORMOTEROL FUMARATE DIHYDRATE 160; 4.5 UG/1; UG/1
160-4.5 AEROSOL RESPIRATORY (INHALATION) TWICE DAILY
Qty: 1 | Refills: 0 | Status: ACTIVE | COMMUNITY
Start: 2020-08-07 | End: 1900-01-01

## 2024-05-24 ENCOUNTER — APPOINTMENT (OUTPATIENT)
Dept: INTERNAL MEDICINE | Facility: CLINIC | Age: 54
End: 2024-05-24

## 2024-11-11 ENCOUNTER — OUTPATIENT (OUTPATIENT)
Dept: OUTPATIENT SERVICES | Facility: HOSPITAL | Age: 54
LOS: 1 days | End: 2024-11-11
Payer: MEDICAID

## 2024-11-11 ENCOUNTER — APPOINTMENT (OUTPATIENT)
Age: 54
End: 2024-11-11
Payer: MEDICAID

## 2024-11-11 VITALS
DIASTOLIC BLOOD PRESSURE: 91 MMHG | HEIGHT: 68 IN | SYSTOLIC BLOOD PRESSURE: 145 MMHG | TEMPERATURE: 97.3 F | OXYGEN SATURATION: 95 % | WEIGHT: 265 LBS | HEART RATE: 69 BPM | BODY MASS INDEX: 40.16 KG/M2

## 2024-11-11 DIAGNOSIS — N20.0 CALCULUS OF KIDNEY: Chronic | ICD-10-CM

## 2024-11-11 DIAGNOSIS — N48.6 INDURATION PENIS PLASTICA: ICD-10-CM

## 2024-11-11 DIAGNOSIS — M51.26 OTHER INTERVERTEBRAL DISC DISPLACEMENT, LUMBAR REGION: Chronic | ICD-10-CM

## 2024-11-11 DIAGNOSIS — N48.1 BALANITIS: ICD-10-CM

## 2024-11-11 DIAGNOSIS — N47.7 OTHER INFLAMMATORY DISEASES OF PREPUCE: ICD-10-CM

## 2024-11-11 DIAGNOSIS — N52.9 MALE ERECTILE DYSFUNCTION, UNSPECIFIED: ICD-10-CM

## 2024-11-11 DIAGNOSIS — Z00.00 ENCOUNTER FOR GENERAL ADULT MEDICAL EXAMINATION WITHOUT ABNORMAL FINDINGS: ICD-10-CM

## 2024-11-11 DIAGNOSIS — I82.90 ACUTE EMBOLISM AND THROMBOSIS OF UNSPECIFIED VEIN: Chronic | ICD-10-CM

## 2024-11-11 PROCEDURE — G2211 COMPLEX E/M VISIT ADD ON: CPT | Mod: NC

## 2024-11-11 PROCEDURE — 99214 OFFICE O/P EST MOD 30 MIN: CPT

## 2024-11-18 DIAGNOSIS — N47.7 OTHER INFLAMMATORY DISEASES OF PREPUCE: ICD-10-CM

## 2024-11-18 DIAGNOSIS — N48.6 INDURATION PENIS PLASTICA: ICD-10-CM

## 2024-11-18 DIAGNOSIS — N52.9 MALE ERECTILE DYSFUNCTION, UNSPECIFIED: ICD-10-CM

## 2024-11-18 DIAGNOSIS — N48.1 BALANITIS: ICD-10-CM

## 2024-11-29 ENCOUNTER — EMERGENCY (EMERGENCY)
Facility: HOSPITAL | Age: 54
LOS: 0 days | Discharge: HOME | End: 2024-11-30
Attending: STUDENT IN AN ORGANIZED HEALTH CARE EDUCATION/TRAINING PROGRAM
Payer: MEDICAID

## 2024-11-29 VITALS
SYSTOLIC BLOOD PRESSURE: 181 MMHG | RESPIRATION RATE: 17 BRPM | DIASTOLIC BLOOD PRESSURE: 118 MMHG | HEART RATE: 80 BPM | OXYGEN SATURATION: 98 % | TEMPERATURE: 98 F | WEIGHT: 270.07 LBS | HEIGHT: 67 IN

## 2024-11-29 DIAGNOSIS — R20.2 PARESTHESIA OF SKIN: ICD-10-CM

## 2024-11-29 DIAGNOSIS — M51.26 OTHER INTERVERTEBRAL DISC DISPLACEMENT, LUMBAR REGION: Chronic | ICD-10-CM

## 2024-11-29 DIAGNOSIS — I10 ESSENTIAL (PRIMARY) HYPERTENSION: ICD-10-CM

## 2024-11-29 DIAGNOSIS — Z87.891 PERSONAL HISTORY OF NICOTINE DEPENDENCE: ICD-10-CM

## 2024-11-29 DIAGNOSIS — E11.9 TYPE 2 DIABETES MELLITUS WITHOUT COMPLICATIONS: ICD-10-CM

## 2024-11-29 DIAGNOSIS — N20.0 CALCULUS OF KIDNEY: Chronic | ICD-10-CM

## 2024-11-29 DIAGNOSIS — I82.90 ACUTE EMBOLISM AND THROMBOSIS OF UNSPECIFIED VEIN: Chronic | ICD-10-CM

## 2024-11-29 PROCEDURE — 74177 CT ABD & PELVIS W/CONTRAST: CPT | Mod: MC

## 2024-11-29 PROCEDURE — 36415 COLL VENOUS BLD VENIPUNCTURE: CPT

## 2024-11-29 PROCEDURE — 93010 ELECTROCARDIOGRAM REPORT: CPT

## 2024-11-29 PROCEDURE — 85018 HEMOGLOBIN: CPT

## 2024-11-29 PROCEDURE — 83735 ASSAY OF MAGNESIUM: CPT

## 2024-11-29 PROCEDURE — 85014 HEMATOCRIT: CPT

## 2024-11-29 PROCEDURE — 82010 KETONE BODYS QUAN: CPT

## 2024-11-29 PROCEDURE — 82330 ASSAY OF CALCIUM: CPT

## 2024-11-29 PROCEDURE — 81003 URINALYSIS AUTO W/O SCOPE: CPT

## 2024-11-29 PROCEDURE — 84295 ASSAY OF SERUM SODIUM: CPT

## 2024-11-29 PROCEDURE — 93005 ELECTROCARDIOGRAM TRACING: CPT

## 2024-11-29 PROCEDURE — 96374 THER/PROPH/DIAG INJ IV PUSH: CPT | Mod: XU

## 2024-11-29 PROCEDURE — 84132 ASSAY OF SERUM POTASSIUM: CPT

## 2024-11-29 PROCEDURE — 83605 ASSAY OF LACTIC ACID: CPT

## 2024-11-29 PROCEDURE — 80053 COMPREHEN METABOLIC PANEL: CPT

## 2024-11-29 PROCEDURE — 82962 GLUCOSE BLOOD TEST: CPT

## 2024-11-29 PROCEDURE — 82803 BLOOD GASES ANY COMBINATION: CPT

## 2024-11-29 PROCEDURE — 99285 EMERGENCY DEPT VISIT HI MDM: CPT | Mod: 25

## 2024-11-29 PROCEDURE — 85025 COMPLETE CBC W/AUTO DIFF WBC: CPT

## 2024-11-30 LAB
ALBUMIN SERPL ELPH-MCNC: 4.2 G/DL — SIGNIFICANT CHANGE UP (ref 3.5–5.2)
ALP SERPL-CCNC: 116 U/L — HIGH (ref 30–115)
ALT FLD-CCNC: 41 U/L — SIGNIFICANT CHANGE UP (ref 0–41)
ANION GAP SERPL CALC-SCNC: 12 MMOL/L — SIGNIFICANT CHANGE UP (ref 7–14)
APPEARANCE UR: CLEAR — SIGNIFICANT CHANGE UP
AST SERPL-CCNC: 28 U/L — SIGNIFICANT CHANGE UP (ref 0–41)
B-OH-BUTYR SERPL-SCNC: <0.2 MMOL/L — SIGNIFICANT CHANGE UP
BASOPHILS # BLD AUTO: 0.04 K/UL — SIGNIFICANT CHANGE UP (ref 0–0.2)
BASOPHILS NFR BLD AUTO: 0.4 % — SIGNIFICANT CHANGE UP (ref 0–1)
BILIRUB SERPL-MCNC: 0.3 MG/DL — SIGNIFICANT CHANGE UP (ref 0.2–1.2)
BILIRUB UR-MCNC: NEGATIVE — SIGNIFICANT CHANGE UP
BUN SERPL-MCNC: 14 MG/DL — SIGNIFICANT CHANGE UP (ref 10–20)
CALCIUM SERPL-MCNC: 9.1 MG/DL — SIGNIFICANT CHANGE UP (ref 8.4–10.5)
CHLORIDE SERPL-SCNC: 101 MMOL/L — SIGNIFICANT CHANGE UP (ref 98–110)
CO2 SERPL-SCNC: 24 MMOL/L — SIGNIFICANT CHANGE UP (ref 17–32)
COLOR SPEC: YELLOW — SIGNIFICANT CHANGE UP
CREAT SERPL-MCNC: 0.9 MG/DL — SIGNIFICANT CHANGE UP (ref 0.7–1.5)
DIFF PNL FLD: NEGATIVE — SIGNIFICANT CHANGE UP
EGFR: 101 ML/MIN/1.73M2 — SIGNIFICANT CHANGE UP
EGFR: 101 ML/MIN/1.73M2 — SIGNIFICANT CHANGE UP
EOSINOPHIL # BLD AUTO: 0.4 K/UL — SIGNIFICANT CHANGE UP (ref 0–0.7)
EOSINOPHIL NFR BLD AUTO: 3.7 % — SIGNIFICANT CHANGE UP (ref 0–8)
GAS PNL BLDV: SIGNIFICANT CHANGE UP
GLUCOSE SERPL-MCNC: 259 MG/DL — HIGH (ref 70–99)
GLUCOSE UR QL: >=1000 MG/DL
HCT VFR BLD CALC: 42.7 % — SIGNIFICANT CHANGE UP (ref 42–52)
HGB BLD-MCNC: 14.9 G/DL — SIGNIFICANT CHANGE UP (ref 14–18)
IMM GRANULOCYTES NFR BLD AUTO: 0.2 % — SIGNIFICANT CHANGE UP (ref 0.1–0.3)
KETONES UR-MCNC: NEGATIVE MG/DL — SIGNIFICANT CHANGE UP
LEUKOCYTE ESTERASE UR-ACNC: NEGATIVE — SIGNIFICANT CHANGE UP
LYMPHOCYTES # BLD AUTO: 39.7 % — SIGNIFICANT CHANGE UP (ref 20.5–51.1)
LYMPHOCYTES # BLD AUTO: 4.32 K/UL — HIGH (ref 1.2–3.4)
MAGNESIUM SERPL-MCNC: 1.8 MG/DL — SIGNIFICANT CHANGE UP (ref 1.8–2.4)
MCHC RBC-ENTMCNC: 29 PG — SIGNIFICANT CHANGE UP (ref 27–31)
MCHC RBC-ENTMCNC: 34.9 G/DL — SIGNIFICANT CHANGE UP (ref 32–37)
MCV RBC AUTO: 83.2 FL — SIGNIFICANT CHANGE UP (ref 80–94)
MONOCYTES # BLD AUTO: 0.7 K/UL — HIGH (ref 0.1–0.6)
MONOCYTES NFR BLD AUTO: 6.4 % — SIGNIFICANT CHANGE UP (ref 1.7–9.3)
NEUTROPHILS # BLD AUTO: 5.4 K/UL — SIGNIFICANT CHANGE UP (ref 1.4–6.5)
NEUTROPHILS NFR BLD AUTO: 49.6 % — SIGNIFICANT CHANGE UP (ref 42.2–75.2)
NITRITE UR-MCNC: NEGATIVE — SIGNIFICANT CHANGE UP
NRBC # BLD: 0 /100 WBCS — SIGNIFICANT CHANGE UP (ref 0–0)
NRBC BLD-RTO: 0 /100 WBCS — SIGNIFICANT CHANGE UP (ref 0–0)
PH UR: 5.5 — SIGNIFICANT CHANGE UP (ref 5–8)
PLATELET # BLD AUTO: 235 K/UL — SIGNIFICANT CHANGE UP (ref 130–400)
PMV BLD: 9.8 FL — SIGNIFICANT CHANGE UP (ref 7.4–10.4)
POTASSIUM SERPL-MCNC: 4.5 MMOL/L — SIGNIFICANT CHANGE UP (ref 3.5–5)
POTASSIUM SERPL-SCNC: 4.5 MMOL/L — SIGNIFICANT CHANGE UP (ref 3.5–5)
PROT SERPL-MCNC: 7 G/DL — SIGNIFICANT CHANGE UP (ref 6–8)
PROT UR-MCNC: NEGATIVE MG/DL — SIGNIFICANT CHANGE UP
RBC # BLD: 5.13 M/UL — SIGNIFICANT CHANGE UP (ref 4.7–6.1)
RBC # FLD: 13 % — SIGNIFICANT CHANGE UP (ref 11.5–14.5)
SODIUM SERPL-SCNC: 137 MMOL/L — SIGNIFICANT CHANGE UP (ref 135–146)
SP GR SPEC: 1.02 — SIGNIFICANT CHANGE UP (ref 1–1.03)
UROBILINOGEN FLD QL: 0.2 MG/DL — SIGNIFICANT CHANGE UP (ref 0.2–1)
WBC # BLD: 10.88 K/UL — HIGH (ref 4.8–10.8)
WBC # FLD AUTO: 10.88 K/UL — HIGH (ref 4.8–10.8)

## 2024-11-30 PROCEDURE — 74177 CT ABD & PELVIS W/CONTRAST: CPT | Mod: 26,MC

## 2024-11-30 PROCEDURE — 93010 ELECTROCARDIOGRAM REPORT: CPT

## 2024-11-30 RX ORDER — LIDOCAINE HYDROCHLORIDE 20 MG/ML
1 JELLY TOPICAL
Qty: 2 | Refills: 0
Start: 2024-11-30 | End: 2024-12-09

## 2024-11-30 RX ORDER — METHOCARBAMOL 500 MG/1
2 TABLET, FILM COATED ORAL
Qty: 20 | Refills: 0
Start: 2024-11-30 | End: 2024-12-04

## 2024-11-30 RX ORDER — IBUPROFEN 200 MG
1 TABLET ORAL
Qty: 20 | Refills: 0
Start: 2024-11-30 | End: 2024-12-04

## 2024-11-30 RX ORDER — KETOROLAC TROMETHAMINE 30 MG/ML
30 INJECTION, SOLUTION INTRAMUSCULAR; INTRAVENOUS ONCE
Refills: 0 | Status: DISCONTINUED | OUTPATIENT
Start: 2024-11-30 | End: 2024-11-30

## 2024-11-30 RX ORDER — LIDOCAINE HYDROCHLORIDE 20 MG/ML
1 JELLY TOPICAL ONCE
Refills: 0 | Status: COMPLETED | OUTPATIENT
Start: 2024-11-30 | End: 2024-11-30

## 2024-11-30 RX ORDER — AMLODIPINE BESYLATE 10 MG/1
1 TABLET ORAL
Qty: 30 | Refills: 0
Start: 2024-11-30 | End: 2024-12-29

## 2024-11-30 RX ADMIN — KETOROLAC TROMETHAMINE 30 MILLIGRAM(S): 30 INJECTION, SOLUTION INTRAMUSCULAR; INTRAVENOUS at 06:17

## 2024-11-30 RX ADMIN — Medication 2000 MILLILITER(S): at 01:23

## 2024-11-30 RX ADMIN — LIDOCAINE HYDROCHLORIDE 1 PATCH: 20 JELLY TOPICAL at 06:17

## 2025-01-22 ENCOUNTER — APPOINTMENT (OUTPATIENT)
Age: 55
End: 2025-01-22
Payer: MEDICAID

## 2025-01-22 ENCOUNTER — OUTPATIENT (OUTPATIENT)
Dept: OUTPATIENT SERVICES | Facility: HOSPITAL | Age: 55
LOS: 1 days | End: 2025-01-22
Payer: MEDICAID

## 2025-01-22 VITALS
TEMPERATURE: 97 F | OXYGEN SATURATION: 97 % | BODY MASS INDEX: 40.16 KG/M2 | DIASTOLIC BLOOD PRESSURE: 82 MMHG | HEIGHT: 68 IN | SYSTOLIC BLOOD PRESSURE: 139 MMHG | WEIGHT: 265 LBS | HEART RATE: 62 BPM

## 2025-01-22 DIAGNOSIS — N52.9 MALE ERECTILE DYSFUNCTION, UNSPECIFIED: ICD-10-CM

## 2025-01-22 DIAGNOSIS — Z12.5 ENCOUNTER FOR SCREENING FOR MALIGNANT NEOPLASM OF PROSTATE: ICD-10-CM

## 2025-01-22 DIAGNOSIS — Z00.00 ENCOUNTER FOR GENERAL ADULT MEDICAL EXAMINATION WITHOUT ABNORMAL FINDINGS: ICD-10-CM

## 2025-01-22 DIAGNOSIS — I82.90 ACUTE EMBOLISM AND THROMBOSIS OF UNSPECIFIED VEIN: Chronic | ICD-10-CM

## 2025-01-22 DIAGNOSIS — R39.9 UNSPECIFIED SYMPTOMS AND SIGNS INVOLVING THE GENITOURINARY SYSTEM: ICD-10-CM

## 2025-01-22 DIAGNOSIS — N20.0 CALCULUS OF KIDNEY: Chronic | ICD-10-CM

## 2025-01-22 PROCEDURE — G2211 COMPLEX E/M VISIT ADD ON: CPT | Mod: NC

## 2025-01-22 PROCEDURE — 99214 OFFICE O/P EST MOD 30 MIN: CPT

## 2025-01-22 RX ORDER — PAPAVERINE HYDROCHLORIDE 30 MG/ML
30 INJECTION, SOLUTION INTRAVENOUS
Qty: 0.5 | Refills: 0 | Status: ACTIVE | COMMUNITY
Start: 2025-01-22 | End: 1900-01-01

## 2025-01-23 LAB
ESTIMATED AVERAGE GLUCOSE: 160 MG/DL
HBA1C MFR BLD HPLC: 7.2 %
PSA FREE FLD-MCNC: 23 %
PSA FREE SERPL-MCNC: 0.09 NG/ML
PSA SERPL-MCNC: 0.37 NG/ML

## 2025-01-24 DIAGNOSIS — Z12.5 ENCOUNTER FOR SCREENING FOR MALIGNANT NEOPLASM OF PROSTATE: ICD-10-CM

## 2025-01-24 LAB — BACTERIA UR CULT: NORMAL

## 2025-01-28 NOTE — DISCHARGE NOTE NURSING/CASE MANAGEMENT/SOCIAL WORK - NURSING SECTION COMPLETE
Outpatient Lactation Visit    Lois Bedolla  [id number]    Consultation Date: 2021     Reason for Lactation Referral: Initial Lactation Consult    Baby's : 2021    Baby's Current Age: 5 day old  Baby's Gestational Age: Gestational Age: 39w4d    Primary Care Provider: Elo Aguilar    Presenting Problem (concerns as stated by parent): no concerns    MATERNAL HISTORY   History of Breast Surgery: no  Breast Changes During Pregnancy: no  Breast Feeding History: primigravida  Maternal Meds: daily prenatal vitamin  Pregnancy Complications: none  Anesthesia during labor: epidural    MATERNAL ASSESSMENT    Breast Size: average, symmetrical, soft after feeding and filling prior to feeding  Nipple Appearance - Left: slightly cracked, with signs of healing, education on further healing techniques provided  Nipple Appearance - Right: slightly cracked, with signs of healing, education on further healing techniques provided  Nipple Erectility - Left: erect with stimulation  Nipple Erectility - Right: erect with stimulation  Areolas Compressibility: soft  Nipple Size: average  Special Equipment Used: none  Day mother reports milk came in:  Day 3    INFANT ASSESSMENT    Oral Anatomy  Mouth: normal  Palate: normal  Jaw: normal  Tongue: normal  Frenulum: normal   Digital Suck Exam: root    FEEDING   Feeding Time: aggressively for 20 minutes  Position:  cradle  Effort to Latch: awake and alert, latched easily  Duration of Breast Feeding: Right Breast: 20; Left Breast: 0  Results: excellent breast feed    Volume of Intake:    Birth Weight: 7 lb 15.3 oz    Hospital discharge weight: 7 lb 11.5 oz (discharged after 24 hours)    Today's Weight 7 lb 8 oz    Total Intake: 2.4 oz  Output: 4-5 soil diapers in last 24 hours, 3-4 wet diapers in last 24 hours    LATCH Score:   Latch: 2 - Good Latch  Audible Swallowin - Spontaneous & frequent  Type of Nipple: (Breast/Nipple) 2 - Everted  Comfort: 2 - Soft, 
Nontender  Hold: 2 - No Assist   Total LATCH Score:  10    FEEDING PLAN    Home Feeding Plan: Continue to feed on demand when  elicits feeding cues with deep latch.  Babe should be eating 8-12 times in a 24 hour period.  Exclusivity explained and encouraged in the early weeks to establish breastfeeding and order in milk supply.  Rooming-in encouraged with explanation of the benefits.  Continue to apply expressed breast milk and Lanolin cream to nipples after feedings for healing and comfort.  Postpartum breastfeeding assessment completed and education provided.  Items included in the education are:     proper positioning and latch    effectiveness of feeding    manual expression    handling and storing breastmilk    maintenance of breastfeeding for the first 6 months    sign/symptoms of infant feeding issues requiring referral to qualified health care provider    LACTATION COMMENTS   Deep latch explained for proper positioning of breast in infant's mouth, maximizing milk transfer and comfort.  Reassurance and encouragement provided in regard to mom's concerns about milk supply.  Follow-up support information provided.  Parents plan to keep [facility] Well-Child Check with Dr. Aguilar as scheduled for 2 week well child check.      Face-to-face Time: 60 minutes with assessment and education.    Carolina Frost RN  2021  9:37 AM        
Patient/Caregiver provided printed discharge information.
84

## 2025-02-06 ENCOUNTER — OUTPATIENT (OUTPATIENT)
Dept: OUTPATIENT SERVICES | Facility: HOSPITAL | Age: 55
LOS: 1 days | End: 2025-02-06
Payer: COMMERCIAL

## 2025-02-06 ENCOUNTER — APPOINTMENT (OUTPATIENT)
Dept: INTERNAL MEDICINE | Facility: CLINIC | Age: 55
End: 2025-02-06
Payer: COMMERCIAL

## 2025-02-06 VITALS
DIASTOLIC BLOOD PRESSURE: 86 MMHG | SYSTOLIC BLOOD PRESSURE: 140 MMHG | OXYGEN SATURATION: 97 % | TEMPERATURE: 97.2 F | HEART RATE: 91 BPM | HEIGHT: 68 IN | WEIGHT: 262 LBS | BODY MASS INDEX: 39.71 KG/M2

## 2025-02-06 VITALS — DIASTOLIC BLOOD PRESSURE: 83 MMHG | SYSTOLIC BLOOD PRESSURE: 123 MMHG

## 2025-02-06 DIAGNOSIS — E78.5 HYPERLIPIDEMIA, UNSPECIFIED: ICD-10-CM

## 2025-02-06 DIAGNOSIS — Z00.00 ENCOUNTER FOR GENERAL ADULT MEDICAL EXAMINATION WITHOUT ABNORMAL FINDINGS: ICD-10-CM

## 2025-02-06 DIAGNOSIS — H53.8 OTHER VISUAL DISTURBANCES: ICD-10-CM

## 2025-02-06 DIAGNOSIS — I82.90 ACUTE EMBOLISM AND THROMBOSIS OF UNSPECIFIED VEIN: Chronic | ICD-10-CM

## 2025-02-06 DIAGNOSIS — N20.0 CALCULUS OF KIDNEY: Chronic | ICD-10-CM

## 2025-02-06 DIAGNOSIS — H81.10 BENIGN PAROXYSMAL VERTIGO, UNSPECIFIED EAR: ICD-10-CM

## 2025-02-06 DIAGNOSIS — E11.9 TYPE 2 DIABETES MELLITUS W/OUT COMPLICATIONS: ICD-10-CM

## 2025-02-06 DIAGNOSIS — F10.90 ALCOHOL USE, UNSPECIFIED, UNCOMPLICATED: ICD-10-CM

## 2025-02-06 DIAGNOSIS — M51.26 OTHER INTERVERTEBRAL DISC DISPLACEMENT, LUMBAR REGION: Chronic | ICD-10-CM

## 2025-02-06 PROCEDURE — 84443 ASSAY THYROID STIM HORMONE: CPT

## 2025-02-06 PROCEDURE — G2211 COMPLEX E/M VISIT ADD ON: CPT | Mod: NC

## 2025-02-06 PROCEDURE — 85027 COMPLETE CBC AUTOMATED: CPT

## 2025-02-06 PROCEDURE — 80053 COMPREHEN METABOLIC PANEL: CPT

## 2025-02-06 PROCEDURE — 99214 OFFICE O/P EST MOD 30 MIN: CPT

## 2025-02-06 PROCEDURE — 80061 LIPID PANEL: CPT

## 2025-02-06 PROCEDURE — 82306 VITAMIN D 25 HYDROXY: CPT

## 2025-02-06 PROCEDURE — 82607 VITAMIN B-12: CPT

## 2025-02-06 PROCEDURE — 82746 ASSAY OF FOLIC ACID SERUM: CPT

## 2025-02-06 RX ORDER — METFORMIN ER 500 MG 500 MG/1
500 TABLET ORAL DAILY
Qty: 30 | Refills: 2 | Status: ACTIVE | COMMUNITY
Start: 2025-02-06 | End: 1900-01-01

## 2025-02-06 RX ORDER — TIRZEPATIDE 2.5 MG/.5ML
2.5 INJECTION, SOLUTION SUBCUTANEOUS
Qty: 1 | Refills: 2 | Status: ACTIVE | COMMUNITY
Start: 2025-02-06

## 2025-02-06 RX ORDER — ALBUTEROL SULFATE 90 UG/1
108 (90 BASE) INHALANT RESPIRATORY (INHALATION) EVERY 6 HOURS
Qty: 1 | Refills: 2 | Status: ACTIVE | COMMUNITY
Start: 2025-02-06 | End: 1900-01-01

## 2025-02-06 RX ORDER — ATORVASTATIN CALCIUM 20 MG/1
20 TABLET, FILM COATED ORAL AT BEDTIME
Qty: 30 | Refills: 2 | Status: ACTIVE | COMMUNITY
Start: 2025-02-06 | End: 1900-01-01

## 2025-02-07 DIAGNOSIS — H53.8 OTHER VISUAL DISTURBANCES: ICD-10-CM

## 2025-02-07 DIAGNOSIS — E11.9 TYPE 2 DIABETES MELLITUS WITHOUT COMPLICATIONS: ICD-10-CM

## 2025-02-07 DIAGNOSIS — F10.90 ALCOHOL USE, UNSPECIFIED, UNCOMPLICATED: ICD-10-CM

## 2025-02-07 DIAGNOSIS — H81.10 BENIGN PAROXYSMAL VERTIGO, UNSPECIFIED EAR: ICD-10-CM

## 2025-02-07 DIAGNOSIS — E78.5 HYPERLIPIDEMIA, UNSPECIFIED: ICD-10-CM

## 2025-02-15 LAB
25(OH)D3 SERPL-MCNC: 14 NG/ML
ALBUMIN SERPL ELPH-MCNC: 4.3 G/DL
ALP BLD-CCNC: 94 U/L
ALT SERPL-CCNC: 35 U/L
ANION GAP SERPL CALC-SCNC: 12 MMOL/L
AST SERPL-CCNC: 19 U/L
BILIRUB SERPL-MCNC: 0.4 MG/DL
BUN SERPL-MCNC: 14 MG/DL
CALCIUM SERPL-MCNC: 9.1 MG/DL
CHLORIDE SERPL-SCNC: 102 MMOL/L
CHOLEST SERPL-MCNC: 181 MG/DL
CO2 SERPL-SCNC: 22 MMOL/L
CREAT SERPL-MCNC: 0.8 MG/DL
EGFR: 105 ML/MIN/1.73M2
FOLATE SERPL-MCNC: 9.7 NG/ML
GLUCOSE SERPL-MCNC: 140 MG/DL
HCT VFR BLD CALC: 47.5 %
HDLC SERPL-MCNC: 40 MG/DL
HGB BLD-MCNC: 15.9 G/DL
LDLC SERPL CALC-MCNC: 117 MG/DL
MCHC RBC-ENTMCNC: 28.1 PG
MCHC RBC-ENTMCNC: 33.5 G/DL
MCV RBC AUTO: 83.9 FL
NONHDLC SERPL-MCNC: 141 MG/DL
PLATELET # BLD AUTO: 272 K/UL
PMV BLD AUTO: 0 /100 WBCS
PMV BLD: 9.8 FL
POTASSIUM SERPL-SCNC: 4.2 MMOL/L
PROT SERPL-MCNC: 7.5 G/DL
RBC # BLD: 5.66 M/UL
RBC # FLD: 13.5 %
SODIUM SERPL-SCNC: 136 MMOL/L
TRIGL SERPL-MCNC: 136 MG/DL
TSH SERPL-ACNC: 2.69 UIU/ML
VIT B12 SERPL-MCNC: 628 PG/ML
WBC # FLD AUTO: 10.37 K/UL

## 2025-02-21 ENCOUNTER — APPOINTMENT (OUTPATIENT)
Dept: UROLOGY | Facility: CLINIC | Age: 55
End: 2025-02-21

## 2025-02-21 ENCOUNTER — APPOINTMENT (OUTPATIENT)
Dept: UROLOGY | Facility: CLINIC | Age: 55
End: 2025-02-21
Payer: MEDICAID

## 2025-02-21 VITALS
OXYGEN SATURATION: 94 % | BODY MASS INDEX: 40.16 KG/M2 | HEIGHT: 68 IN | RESPIRATION RATE: 18 BRPM | HEART RATE: 68 BPM | SYSTOLIC BLOOD PRESSURE: 138 MMHG | WEIGHT: 265 LBS | DIASTOLIC BLOOD PRESSURE: 82 MMHG

## 2025-02-21 DIAGNOSIS — N52.9 MALE ERECTILE DYSFUNCTION, UNSPECIFIED: ICD-10-CM

## 2025-02-21 DIAGNOSIS — N48.1 BALANITIS: ICD-10-CM

## 2025-02-21 DIAGNOSIS — N48.6 INDURATION PENIS PLASTICA: ICD-10-CM

## 2025-02-21 PROCEDURE — J3490T: CUSTOM | Mod: NC

## 2025-02-21 PROCEDURE — 54235 NJX CORPORA CAVERNOSA RX AGT: CPT

## 2025-02-21 PROCEDURE — 99214 OFFICE O/P EST MOD 30 MIN: CPT | Mod: 25

## 2025-02-21 PROCEDURE — 93980 PENILE VASCULAR STUDY: CPT

## 2025-03-24 ENCOUNTER — APPOINTMENT (OUTPATIENT)
Dept: OTOLARYNGOLOGY | Facility: CLINIC | Age: 55
End: 2025-03-24

## 2025-03-31 ENCOUNTER — APPOINTMENT (OUTPATIENT)
Dept: UROLOGY | Facility: CLINIC | Age: 55
End: 2025-03-31
Payer: MEDICAID

## 2025-03-31 ENCOUNTER — APPOINTMENT (OUTPATIENT)
Dept: NEUROSURGERY | Facility: CLINIC | Age: 55
End: 2025-03-31

## 2025-03-31 DIAGNOSIS — N52.9 MALE ERECTILE DYSFUNCTION, UNSPECIFIED: ICD-10-CM

## 2025-03-31 DIAGNOSIS — N48.1 BALANITIS: ICD-10-CM

## 2025-03-31 PROCEDURE — 99214 OFFICE O/P EST MOD 30 MIN: CPT

## 2025-03-31 PROCEDURE — G2211 COMPLEX E/M VISIT ADD ON: CPT | Mod: NC

## 2025-05-05 ENCOUNTER — APPOINTMENT (OUTPATIENT)
Dept: OPHTHALMOLOGY | Facility: CLINIC | Age: 55
End: 2025-05-05

## 2025-05-06 ENCOUNTER — OUTPATIENT (OUTPATIENT)
Dept: OUTPATIENT SERVICES | Facility: HOSPITAL | Age: 55
LOS: 1 days | End: 2025-05-06
Payer: MEDICAID

## 2025-05-06 VITALS
WEIGHT: 261.03 LBS | HEART RATE: 65 BPM | TEMPERATURE: 98 F | SYSTOLIC BLOOD PRESSURE: 134 MMHG | OXYGEN SATURATION: 97 % | DIASTOLIC BLOOD PRESSURE: 86 MMHG | HEIGHT: 68 IN | RESPIRATION RATE: 20 BRPM

## 2025-05-06 DIAGNOSIS — N20.0 CALCULUS OF KIDNEY: Chronic | ICD-10-CM

## 2025-05-06 DIAGNOSIS — M51.26 OTHER INTERVERTEBRAL DISC DISPLACEMENT, LUMBAR REGION: Chronic | ICD-10-CM

## 2025-05-06 DIAGNOSIS — N47.1 PHIMOSIS: ICD-10-CM

## 2025-05-06 DIAGNOSIS — Z01.818 ENCOUNTER FOR OTHER PREPROCEDURAL EXAMINATION: ICD-10-CM

## 2025-05-06 DIAGNOSIS — I82.90 ACUTE EMBOLISM AND THROMBOSIS OF UNSPECIFIED VEIN: Chronic | ICD-10-CM

## 2025-05-06 DIAGNOSIS — Z95.828 PRESENCE OF OTHER VASCULAR IMPLANTS AND GRAFTS: Chronic | ICD-10-CM

## 2025-05-06 LAB
A1C WITH ESTIMATED AVERAGE GLUCOSE RESULT: 6.8 % — HIGH (ref 4–5.6)
APPEARANCE UR: CLEAR — SIGNIFICANT CHANGE UP
BILIRUB UR-MCNC: NEGATIVE — SIGNIFICANT CHANGE UP
COLOR SPEC: YELLOW — SIGNIFICANT CHANGE UP
DIFF PNL FLD: NEGATIVE — SIGNIFICANT CHANGE UP
ESTIMATED AVERAGE GLUCOSE: 148 MG/DL — HIGH (ref 68–114)
GLUCOSE UR QL: NEGATIVE MG/DL — SIGNIFICANT CHANGE UP
KETONES UR-MCNC: NEGATIVE MG/DL — SIGNIFICANT CHANGE UP
LEUKOCYTE ESTERASE UR-ACNC: NEGATIVE — SIGNIFICANT CHANGE UP
NITRITE UR-MCNC: NEGATIVE — SIGNIFICANT CHANGE UP
PH UR: 6.5 — SIGNIFICANT CHANGE UP (ref 5–8)
PROT UR-MCNC: SIGNIFICANT CHANGE UP MG/DL
SP GR SPEC: 1.02 — SIGNIFICANT CHANGE UP (ref 1–1.03)
UROBILINOGEN FLD QL: 0.2 MG/DL — SIGNIFICANT CHANGE UP (ref 0.2–1)

## 2025-05-06 PROCEDURE — 93010 ELECTROCARDIOGRAM REPORT: CPT

## 2025-05-06 PROCEDURE — 36415 COLL VENOUS BLD VENIPUNCTURE: CPT

## 2025-05-06 PROCEDURE — 81003 URINALYSIS AUTO W/O SCOPE: CPT

## 2025-05-06 PROCEDURE — 87086 URINE CULTURE/COLONY COUNT: CPT

## 2025-05-06 PROCEDURE — 99214 OFFICE O/P EST MOD 30 MIN: CPT | Mod: 25

## 2025-05-06 PROCEDURE — 93005 ELECTROCARDIOGRAM TRACING: CPT

## 2025-05-06 PROCEDURE — 83036 HEMOGLOBIN GLYCOSYLATED A1C: CPT

## 2025-05-06 RX ORDER — METFORMIN HYDROCHLORIDE 850 MG/1
1 TABLET ORAL
Refills: 0 | DISCHARGE

## 2025-05-06 RX ORDER — AMLODIPINE BESYLATE 10 MG/1
1 TABLET ORAL
Refills: 0 | DISCHARGE

## 2025-05-06 NOTE — H&P PST ADULT - HISTORY OF PRESENT ILLNESS
54 year old male presents for circumcision on 5/20/2025 under general anesthesia with Dr. Dillon     Pt bridger zayas.    PATIENT / GUARDIAN CURRENTLY DENIES CHEST PAIN  SHORTNESS OF BREATH  PALPITATIONS,  DYSURIA, OR UPPER RESPIRATORY INFECTION IN PAST 2 WEEKS  Patient / Guardian verbalized understanding of instructions and was given the opportunity to ask questions and have them answered.  As per patient, this is their complete medical and surgical history, including medications both prescribed or over the counter.  written and verbal instructions with teach back on chlorhexidine shampoo provided,  pt verbalized understanding with returned demonstration    Anesthesia Alert  NO--Difficult Airway  NO--History of neck surgery or radiation  NO--Limited ROM of neck  NO--History of Malignant hyperthermia  NO--Personal or family history of Pseudocholinesterase deficiency  NO--Prior Anesthesia Complication  NO--Latex Allergy  NO--Loose teeth  NO--History of Rheumatoid Arthritis  YES--BRISA- diagnosed but does not use CPAP   NO--Bleeding risk  NO--Other_____  Mallampati airway: Class II    [employer] Activity Status Index (DASI) from [employer]  on 5/6/2025  ** All calculations should be rechecked by clinician prior to use **    RESULT SUMMARY:  28.7 points  The higher the score (maximum 58.2), the higher the functional status.    6.27 METs        INPUTS:  Take care of self —> 2.75 = Yes  Walk indoors —> 1.75 = Yes  Walk 1&ndash;2 blocks on level ground —> 2.75 = Yes  Climb a flight of stairs or walk up a hill —> 5.5 = Yes  Run a short distance —> 0 = No  Do light work around the house —> 2.7 = Yes  Do moderate work around the house —> 3.5 = Yes  Do heavy work around the house —> 0 = No  Do yardwork —> 4.5 = Yes  Have sexual relations —> 5.25 = Yes  Participate in moderate recreational activities —> 0 = No  Participate in strenuous sports —> 0 = No    Revised Cardiac Risk Index for Pre-Operative Risk from [employer]  on 5/6/2025  ** All calculations should be rechecked by clinician prior to use **    RESULT SUMMARY:  0 points  RCRI Score    3.9 %  Risk of major cardiac event      INPUTS:  High-risk surgery —> 0 = No  History of ischemic heart disease —> 0 = No  History of congestive heart failure —> 0 = No  History of cerebrovascular disease —> 0 = No  Pre-operative treatment with insulin —> 0 = No  Pre-operative creatinine >2 mg/dL / 176.8 µmol/L —> 0 = No

## 2025-05-06 NOTE — H&P PST ADULT - NSICDXPASTMEDICALHX_GEN_ALL_CORE_FT
PAST MEDICAL HISTORY:  Diabetes     DVT, lower extremity     HTN (hypertension)     Kidney stones     Lumbar disc herniation     Over weight

## 2025-05-06 NOTE — H&P PST ADULT - NSICDXPASTSURGICALHX_GEN_ALL_CORE_FT
PAST SURGICAL HISTORY:  Herniated lumbar intervertebral disc discectomy    Kidney stone ureteroscopy    S/P IVC filter

## 2025-05-07 DIAGNOSIS — Z01.818 ENCOUNTER FOR OTHER PREPROCEDURAL EXAMINATION: ICD-10-CM

## 2025-05-07 DIAGNOSIS — N47.1 PHIMOSIS: ICD-10-CM

## 2025-05-07 PROBLEM — R06.02 SHORTNESS OF BREATH: Chronic | Status: INACTIVE | Noted: 2020-08-19 | Resolved: 2025-05-06

## 2025-05-07 LAB
CULTURE RESULTS: SIGNIFICANT CHANGE UP
SPECIMEN SOURCE: SIGNIFICANT CHANGE UP

## 2025-05-08 ENCOUNTER — OUTPATIENT (OUTPATIENT)
Dept: OUTPATIENT SERVICES | Facility: HOSPITAL | Age: 55
LOS: 1 days | End: 2025-05-08
Payer: COMMERCIAL

## 2025-05-08 ENCOUNTER — APPOINTMENT (OUTPATIENT)
Dept: INTERNAL MEDICINE | Facility: CLINIC | Age: 55
End: 2025-05-08
Payer: COMMERCIAL

## 2025-05-08 VITALS
SYSTOLIC BLOOD PRESSURE: 138 MMHG | HEART RATE: 67 BPM | WEIGHT: 258 LBS | HEIGHT: 68 IN | DIASTOLIC BLOOD PRESSURE: 91 MMHG | BODY MASS INDEX: 39.1 KG/M2 | TEMPERATURE: 97.3 F | OXYGEN SATURATION: 96 %

## 2025-05-08 VITALS — SYSTOLIC BLOOD PRESSURE: 136 MMHG | DIASTOLIC BLOOD PRESSURE: 91 MMHG

## 2025-05-08 DIAGNOSIS — E55.9 VITAMIN D DEFICIENCY, UNSPECIFIED: ICD-10-CM

## 2025-05-08 DIAGNOSIS — Z00.00 ENCOUNTER FOR GENERAL ADULT MEDICAL EXAMINATION WITHOUT ABNORMAL FINDINGS: ICD-10-CM

## 2025-05-08 DIAGNOSIS — Z95.828 PRESENCE OF OTHER VASCULAR IMPLANTS AND GRAFTS: Chronic | ICD-10-CM

## 2025-05-08 DIAGNOSIS — G47.33 OBSTRUCTIVE SLEEP APNEA (ADULT) (PEDIATRIC): ICD-10-CM

## 2025-05-08 DIAGNOSIS — I10 ESSENTIAL (PRIMARY) HYPERTENSION: ICD-10-CM

## 2025-05-08 DIAGNOSIS — F10.90 ALCOHOL USE, UNSPECIFIED, UNCOMPLICATED: ICD-10-CM

## 2025-05-08 DIAGNOSIS — Z01.818 ENCOUNTER FOR OTHER PREPROCEDURAL EXAMINATION: ICD-10-CM

## 2025-05-08 DIAGNOSIS — Z87.898 PERSONAL HISTORY OF OTHER SPECIFIED CONDITIONS: ICD-10-CM

## 2025-05-08 DIAGNOSIS — R29.810 FACIAL WEAKNESS: ICD-10-CM

## 2025-05-08 DIAGNOSIS — E66.812 OBESITY, CLASS 2: ICD-10-CM

## 2025-05-08 DIAGNOSIS — M79.661 PAIN IN RIGHT LOWER LEG: ICD-10-CM

## 2025-05-08 DIAGNOSIS — E78.5 HYPERLIPIDEMIA, UNSPECIFIED: ICD-10-CM

## 2025-05-08 DIAGNOSIS — H53.8 OTHER VISUAL DISTURBANCES: ICD-10-CM

## 2025-05-08 DIAGNOSIS — E11.9 TYPE 2 DIABETES MELLITUS W/OUT COMPLICATIONS: ICD-10-CM

## 2025-05-08 PROBLEM — I82.409 ACUTE EMBOLISM AND THROMBOSIS OF UNSPECIFIED DEEP VEINS OF UNSPECIFIED LOWER EXTREMITY: Chronic | Status: ACTIVE | Noted: 2025-05-06

## 2025-05-08 PROBLEM — E66.3 OVERWEIGHT: Chronic | Status: ACTIVE | Noted: 2025-05-06

## 2025-05-08 PROCEDURE — G2211 COMPLEX E/M VISIT ADD ON: CPT | Mod: NC

## 2025-05-08 PROCEDURE — 99214 OFFICE O/P EST MOD 30 MIN: CPT

## 2025-05-08 RX ORDER — MULTIVIT-MIN/FOLIC/VIT K/LYCOP 400-300MCG
50 MCG TABLET ORAL
Qty: 90 | Refills: 1 | Status: ACTIVE | COMMUNITY
Start: 2025-05-08 | End: 1900-01-01

## 2025-05-08 RX ORDER — ATORVASTATIN CALCIUM 40 MG/1
40 TABLET, FILM COATED ORAL DAILY
Qty: 90 | Refills: 1 | Status: ACTIVE | COMMUNITY
Start: 2025-05-08 | End: 1900-01-01

## 2025-05-14 DIAGNOSIS — E78.5 HYPERLIPIDEMIA, UNSPECIFIED: ICD-10-CM

## 2025-05-14 DIAGNOSIS — Z01.818 ENCOUNTER FOR OTHER PREPROCEDURAL EXAMINATION: ICD-10-CM

## 2025-05-14 DIAGNOSIS — E11.9 TYPE 2 DIABETES MELLITUS WITHOUT COMPLICATIONS: ICD-10-CM

## 2025-05-14 DIAGNOSIS — G47.33 OBSTRUCTIVE SLEEP APNEA (ADULT) (PEDIATRIC): ICD-10-CM

## 2025-05-14 DIAGNOSIS — I10 ESSENTIAL (PRIMARY) HYPERTENSION: ICD-10-CM

## 2025-05-14 DIAGNOSIS — E55.9 VITAMIN D DEFICIENCY, UNSPECIFIED: ICD-10-CM

## 2025-05-20 ENCOUNTER — APPOINTMENT (OUTPATIENT)
Dept: UROLOGY | Facility: HOSPITAL | Age: 55
End: 2025-05-20

## 2025-08-01 ENCOUNTER — APPOINTMENT (OUTPATIENT)
Dept: GASTROENTEROLOGY | Facility: CLINIC | Age: 55
End: 2025-08-01

## 2025-08-01 NOTE — PRE-OP CHECKLIST - SITE MARKED BY SURGEON
Home Care received a Referral for Physical Therapy and Occupational Therapy. We have processed the referral for a Start of Care within 48 hours of 8/1.     If you have any questions or concerns, please feel free to contact us at [phone]. Follow the prompts, enter your five digit zip code, and you will be directed to your care team on WEST 1.     
yes

## 2025-08-25 ENCOUNTER — APPOINTMENT (OUTPATIENT)
Dept: UROLOGY | Facility: CLINIC | Age: 55
End: 2025-08-25
Payer: MEDICAID

## 2025-08-25 VITALS
HEIGHT: 68 IN | WEIGHT: 265 LBS | HEART RATE: 67 BPM | SYSTOLIC BLOOD PRESSURE: 137 MMHG | DIASTOLIC BLOOD PRESSURE: 88 MMHG | BODY MASS INDEX: 40.16 KG/M2 | OXYGEN SATURATION: 93 % | TEMPERATURE: 98.1 F | RESPIRATION RATE: 15 BRPM

## 2025-08-25 DIAGNOSIS — N48.1 BALANITIS: ICD-10-CM

## 2025-08-25 DIAGNOSIS — N52.9 MALE ERECTILE DYSFUNCTION, UNSPECIFIED: ICD-10-CM

## 2025-08-25 DIAGNOSIS — N48.6 INDURATION PENIS PLASTICA: ICD-10-CM

## 2025-08-25 PROCEDURE — G2211 COMPLEX E/M VISIT ADD ON: CPT | Mod: NC

## 2025-08-25 PROCEDURE — 99213 OFFICE O/P EST LOW 20 MIN: CPT
